# Patient Record
Sex: FEMALE | Race: WHITE | NOT HISPANIC OR LATINO | Employment: OTHER | URBAN - METROPOLITAN AREA
[De-identification: names, ages, dates, MRNs, and addresses within clinical notes are randomized per-mention and may not be internally consistent; named-entity substitution may affect disease eponyms.]

---

## 2020-06-04 PROBLEM — I10 HYPERTENSION: Status: ACTIVE | Noted: 2020-06-04

## 2020-06-04 PROBLEM — E11.9 TYPE 2 DIABETES MELLITUS WITHOUT COMPLICATION, WITHOUT LONG-TERM CURRENT USE OF INSULIN (HCC): Status: ACTIVE | Noted: 2020-06-04

## 2020-06-04 RX ORDER — BENAZEPRIL HYDROCHLORIDE AND HYDROCHLOROTHIAZIDE 20; 12.5 MG/1; MG/1
TABLET ORAL
COMMUNITY
Start: 2020-04-23 | End: 2020-06-08 | Stop reason: SDUPTHER

## 2020-06-08 ENCOUNTER — OFFICE VISIT (OUTPATIENT)
Dept: FAMILY MEDICINE CLINIC | Facility: CLINIC | Age: 80
End: 2020-06-08
Payer: COMMERCIAL

## 2020-06-08 ENCOUNTER — TELEPHONE (OUTPATIENT)
Dept: FAMILY MEDICINE CLINIC | Facility: CLINIC | Age: 80
End: 2020-06-08

## 2020-06-08 VITALS
TEMPERATURE: 98.2 F | OXYGEN SATURATION: 97 % | BODY MASS INDEX: 28.89 KG/M2 | HEIGHT: 62 IN | WEIGHT: 157 LBS | RESPIRATION RATE: 18 BRPM | SYSTOLIC BLOOD PRESSURE: 144 MMHG | HEART RATE: 66 BPM | DIASTOLIC BLOOD PRESSURE: 80 MMHG

## 2020-06-08 DIAGNOSIS — I10 ESSENTIAL HYPERTENSION: ICD-10-CM

## 2020-06-08 DIAGNOSIS — E11.9 TYPE 2 DIABETES MELLITUS WITHOUT COMPLICATION, WITHOUT LONG-TERM CURRENT USE OF INSULIN (HCC): Primary | ICD-10-CM

## 2020-06-08 DIAGNOSIS — B35.1 TOENAIL FUNGUS: ICD-10-CM

## 2020-06-08 DIAGNOSIS — M1A.4710 OTHER SECONDARY CHRONIC GOUT OF RIGHT ANKLE WITHOUT TOPHUS: ICD-10-CM

## 2020-06-08 DIAGNOSIS — M43.6 TORTICOLLIS: ICD-10-CM

## 2020-06-08 DIAGNOSIS — E11.65 UNCONTROLLED TYPE 2 DIABETES MELLITUS WITH HYPERGLYCEMIA (HCC): Primary | ICD-10-CM

## 2020-06-08 DIAGNOSIS — Z12.83 SKIN CANCER SCREENING: ICD-10-CM

## 2020-06-08 DIAGNOSIS — Z13.220 LIPID SCREENING: ICD-10-CM

## 2020-06-08 DIAGNOSIS — M81.0 AGE-RELATED OSTEOPOROSIS WITHOUT CURRENT PATHOLOGICAL FRACTURE: ICD-10-CM

## 2020-06-08 DIAGNOSIS — E78.2 HYPERLIPIDEMIA, MIXED: ICD-10-CM

## 2020-06-08 DIAGNOSIS — Z00.00 MEDICARE ANNUAL WELLNESS VISIT, SUBSEQUENT: ICD-10-CM

## 2020-06-08 PROBLEM — M10.9 GOUT: Status: ACTIVE | Noted: 2020-06-08

## 2020-06-08 LAB
LEFT EYE DIABETIC RETINOPATHY: NORMAL
LEFT EYE IMAGE QUALITY: NORMAL
LEFT EYE MACULAR EDEMA: NORMAL
LEFT EYE OTHER RETINOPATHY: NORMAL
RIGHT EYE DIABETIC RETINOPATHY: NORMAL
RIGHT EYE IMAGE QUALITY: NORMAL
RIGHT EYE MACULAR EDEMA: NORMAL
RIGHT EYE OTHER RETINOPATHY: NORMAL
SEVERITY (EYE EXAM): NORMAL
SL AMB POCT HEMOGLOBIN AIC: 7.1 (ref ?–6.5)

## 2020-06-08 PROCEDURE — 99214 OFFICE O/P EST MOD 30 MIN: CPT | Performed by: NURSE PRACTITIONER

## 2020-06-08 PROCEDURE — 83036 HEMOGLOBIN GLYCOSYLATED A1C: CPT | Performed by: NURSE PRACTITIONER

## 2020-06-08 PROCEDURE — 3008F BODY MASS INDEX DOCD: CPT | Performed by: NURSE PRACTITIONER

## 2020-06-08 PROCEDURE — 4010F ACE/ARB THERAPY RXD/TAKEN: CPT | Performed by: NURSE PRACTITIONER

## 2020-06-08 PROCEDURE — 3077F SYST BP >= 140 MM HG: CPT | Performed by: NURSE PRACTITIONER

## 2020-06-08 PROCEDURE — 1170F FXNL STATUS ASSESSED: CPT | Performed by: NURSE PRACTITIONER

## 2020-06-08 PROCEDURE — 3051F HG A1C>EQUAL 7.0%<8.0%: CPT | Performed by: NURSE PRACTITIONER

## 2020-06-08 PROCEDURE — 3288F FALL RISK ASSESSMENT DOCD: CPT | Performed by: NURSE PRACTITIONER

## 2020-06-08 PROCEDURE — 1125F AMNT PAIN NOTED PAIN PRSNT: CPT | Performed by: NURSE PRACTITIONER

## 2020-06-08 PROCEDURE — 1101F PT FALLS ASSESS-DOCD LE1/YR: CPT | Performed by: NURSE PRACTITIONER

## 2020-06-08 PROCEDURE — 1036F TOBACCO NON-USER: CPT | Performed by: NURSE PRACTITIONER

## 2020-06-08 PROCEDURE — 1160F RVW MEDS BY RX/DR IN RCRD: CPT | Performed by: NURSE PRACTITIONER

## 2020-06-08 PROCEDURE — 3079F DIAST BP 80-89 MM HG: CPT | Performed by: NURSE PRACTITIONER

## 2020-06-08 PROCEDURE — G0439 PPPS, SUBSEQ VISIT: HCPCS | Performed by: NURSE PRACTITIONER

## 2020-06-08 RX ORDER — SIMVASTATIN 10 MG
10 TABLET ORAL
Qty: 90 TABLET | Refills: 1 | Status: SHIPPED | OUTPATIENT
Start: 2020-06-08 | End: 2020-06-08 | Stop reason: SDUPTHER

## 2020-06-08 RX ORDER — GLIMEPIRIDE 4 MG/1
4 TABLET ORAL
COMMUNITY
End: 2020-06-08 | Stop reason: SDUPTHER

## 2020-06-08 RX ORDER — ACARBOSE 50 MG/1
50 TABLET ORAL
Qty: 90 TABLET | Refills: 1 | Status: SHIPPED | OUTPATIENT
Start: 2020-06-08 | End: 2020-06-08 | Stop reason: SDUPTHER

## 2020-06-08 RX ORDER — ALENDRONATE SODIUM 70 MG/1
70 TABLET ORAL
Qty: 12 TABLET | Refills: 1 | Status: SHIPPED | OUTPATIENT
Start: 2020-06-08 | End: 2020-06-09 | Stop reason: SDUPTHER

## 2020-06-08 RX ORDER — GLIMEPIRIDE 4 MG/1
4 TABLET ORAL
Qty: 90 TABLET | Refills: 1 | Status: SHIPPED | OUTPATIENT
Start: 2020-06-08 | End: 2020-06-08 | Stop reason: SDUPTHER

## 2020-06-08 RX ORDER — NIFEDIPINE 30 MG/1
30 TABLET, FILM COATED, EXTENDED RELEASE ORAL DAILY
Qty: 90 TABLET | Refills: 1 | Status: SHIPPED | OUTPATIENT
Start: 2020-06-08 | End: 2020-06-08 | Stop reason: SDUPTHER

## 2020-06-08 RX ORDER — CARVEDILOL 6.25 MG/1
6.25 TABLET ORAL 2 TIMES DAILY WITH MEALS
Qty: 180 TABLET | Refills: 3 | Status: SHIPPED | OUTPATIENT
Start: 2020-06-08 | End: 2020-06-09 | Stop reason: SDUPTHER

## 2020-06-08 RX ORDER — GLIMEPIRIDE 4 MG/1
4 TABLET ORAL
Qty: 90 TABLET | Refills: 1 | Status: SHIPPED | OUTPATIENT
Start: 2020-06-08 | End: 2020-06-09 | Stop reason: SDUPTHER

## 2020-06-08 RX ORDER — NIFEDIPINE 30 MG/1
30 TABLET, FILM COATED, EXTENDED RELEASE ORAL DAILY
Qty: 90 TABLET | Refills: 1 | Status: SHIPPED | OUTPATIENT
Start: 2020-06-08 | End: 2020-06-08

## 2020-06-08 RX ORDER — ALENDRONATE SODIUM 70 MG/1
70 TABLET ORAL
COMMUNITY
End: 2020-06-08 | Stop reason: SDUPTHER

## 2020-06-08 RX ORDER — ACARBOSE 50 MG/1
50 TABLET ORAL
Qty: 90 TABLET | Refills: 1 | Status: SHIPPED | OUTPATIENT
Start: 2020-06-08 | End: 2020-06-09 | Stop reason: SDUPTHER

## 2020-06-08 RX ORDER — BENAZEPRIL HYDROCHLORIDE AND HYDROCHLOROTHIAZIDE 20; 12.5 MG/1; MG/1
1 TABLET ORAL DAILY
Qty: 90 TABLET | Refills: 2 | Status: SHIPPED | OUTPATIENT
Start: 2020-06-08 | End: 2020-06-08 | Stop reason: SDUPTHER

## 2020-06-08 RX ORDER — ALENDRONATE SODIUM 70 MG/1
70 TABLET ORAL
Qty: 12 TABLET | Refills: 1 | Status: SHIPPED | OUTPATIENT
Start: 2020-06-08 | End: 2020-06-08 | Stop reason: SDUPTHER

## 2020-06-08 RX ORDER — ACARBOSE 50 MG/1
50 TABLET ORAL
COMMUNITY
End: 2020-06-08 | Stop reason: SDUPTHER

## 2020-06-08 RX ORDER — PEN NEEDLE, DIABETIC 30 GX3/16"
NEEDLE, DISPOSABLE MISCELLANEOUS WEEKLY
Qty: 12 EACH | Refills: 2 | Status: SHIPPED | OUTPATIENT
Start: 2020-06-08 | End: 2020-06-09 | Stop reason: SDUPTHER

## 2020-06-08 RX ORDER — SIMVASTATIN 10 MG
10 TABLET ORAL
Qty: 90 TABLET | Refills: 1 | Status: SHIPPED | OUTPATIENT
Start: 2020-06-08 | End: 2020-06-09 | Stop reason: SDUPTHER

## 2020-06-08 RX ORDER — NIFEDIPINE 30 MG/1
30 TABLET, FILM COATED, EXTENDED RELEASE ORAL DAILY
COMMUNITY
End: 2020-06-08 | Stop reason: SDUPTHER

## 2020-06-08 RX ORDER — SIMVASTATIN 10 MG
10 TABLET ORAL
COMMUNITY
End: 2020-06-08 | Stop reason: SDUPTHER

## 2020-06-08 RX ORDER — CYCLOBENZAPRINE HCL 5 MG
5 TABLET ORAL 2 TIMES DAILY PRN
Qty: 10 TABLET | Refills: 1 | Status: SHIPPED | OUTPATIENT
Start: 2020-06-08 | End: 2020-06-09 | Stop reason: SDUPTHER

## 2020-06-08 RX ORDER — CYCLOBENZAPRINE HCL 5 MG
5 TABLET ORAL 2 TIMES DAILY PRN
Qty: 10 TABLET | Refills: 1 | Status: SHIPPED | OUTPATIENT
Start: 2020-06-08 | End: 2020-06-08 | Stop reason: SDUPTHER

## 2020-06-08 RX ORDER — BENAZEPRIL HYDROCHLORIDE AND HYDROCHLOROTHIAZIDE 20; 12.5 MG/1; MG/1
1 TABLET ORAL DAILY
Qty: 90 TABLET | Refills: 2 | Status: SHIPPED | OUTPATIENT
Start: 2020-06-08 | End: 2020-06-09 | Stop reason: SDUPTHER

## 2020-06-09 DIAGNOSIS — I10 ESSENTIAL HYPERTENSION: ICD-10-CM

## 2020-06-09 DIAGNOSIS — E11.9 TYPE 2 DIABETES MELLITUS WITHOUT COMPLICATION, WITHOUT LONG-TERM CURRENT USE OF INSULIN (HCC): ICD-10-CM

## 2020-06-09 DIAGNOSIS — M43.6 TORTICOLLIS: ICD-10-CM

## 2020-06-09 DIAGNOSIS — Z12.83 SKIN CANCER SCREENING: ICD-10-CM

## 2020-06-09 DIAGNOSIS — E11.9 TYPE 2 DIABETES MELLITUS WITHOUT COMPLICATION, WITHOUT LONG-TERM CURRENT USE OF INSULIN (HCC): Primary | ICD-10-CM

## 2020-06-09 DIAGNOSIS — E78.2 HYPERLIPIDEMIA, MIXED: ICD-10-CM

## 2020-06-09 DIAGNOSIS — M81.0 AGE-RELATED OSTEOPOROSIS WITHOUT CURRENT PATHOLOGICAL FRACTURE: ICD-10-CM

## 2020-06-09 DIAGNOSIS — E11.65 UNCONTROLLED TYPE 2 DIABETES MELLITUS WITH HYPERGLYCEMIA (HCC): ICD-10-CM

## 2020-06-09 DIAGNOSIS — B35.1 TOENAIL FUNGUS: ICD-10-CM

## 2020-06-09 DIAGNOSIS — Z00.00 MEDICARE ANNUAL WELLNESS VISIT, SUBSEQUENT: ICD-10-CM

## 2020-06-09 DIAGNOSIS — Z13.220 LIPID SCREENING: ICD-10-CM

## 2020-06-09 DIAGNOSIS — M1A.4710 OTHER SECONDARY CHRONIC GOUT OF RIGHT ANKLE WITHOUT TOPHUS: ICD-10-CM

## 2020-06-09 RX ORDER — PEN NEEDLE, DIABETIC 30 GX3/16"
NEEDLE, DISPOSABLE MISCELLANEOUS WEEKLY
Qty: 12 EACH | Refills: 2 | Status: SHIPPED | OUTPATIENT
Start: 2020-06-09 | End: 2020-12-24

## 2020-06-09 RX ORDER — BLOOD-GLUCOSE METER
EACH MISCELLANEOUS DAILY
Qty: 100 EACH | Refills: 3 | Status: SHIPPED | OUTPATIENT
Start: 2020-06-09

## 2020-06-09 RX ORDER — CARVEDILOL 6.25 MG/1
6.25 TABLET ORAL 2 TIMES DAILY WITH MEALS
Qty: 180 TABLET | Refills: 3 | Status: SHIPPED | OUTPATIENT
Start: 2020-06-09 | End: 2021-07-16

## 2020-06-09 RX ORDER — CYCLOBENZAPRINE HCL 5 MG
5 TABLET ORAL 2 TIMES DAILY PRN
Qty: 10 TABLET | Refills: 1 | Status: SHIPPED | OUTPATIENT
Start: 2020-06-09 | End: 2020-06-09 | Stop reason: SDUPTHER

## 2020-06-09 RX ORDER — SIMVASTATIN 10 MG
10 TABLET ORAL
Qty: 90 TABLET | Refills: 1 | Status: SHIPPED | OUTPATIENT
Start: 2020-06-09 | End: 2021-04-13

## 2020-06-09 RX ORDER — BENAZEPRIL HYDROCHLORIDE AND HYDROCHLOROTHIAZIDE 20; 12.5 MG/1; MG/1
1 TABLET ORAL DAILY
Qty: 90 TABLET | Refills: 2 | Status: SHIPPED | OUTPATIENT
Start: 2020-06-09 | End: 2020-06-09 | Stop reason: SDUPTHER

## 2020-06-09 RX ORDER — ALENDRONATE SODIUM 70 MG/1
70 TABLET ORAL
Qty: 12 TABLET | Refills: 1 | Status: SHIPPED | OUTPATIENT
Start: 2020-06-09 | End: 2020-11-16

## 2020-06-09 RX ORDER — CYCLOBENZAPRINE HCL 5 MG
5 TABLET ORAL 2 TIMES DAILY PRN
Qty: 10 TABLET | Refills: 1 | Status: SHIPPED | OUTPATIENT
Start: 2020-06-09 | End: 2020-07-28

## 2020-06-09 RX ORDER — BENAZEPRIL HYDROCHLORIDE AND HYDROCHLOROTHIAZIDE 20; 12.5 MG/1; MG/1
1 TABLET ORAL DAILY
Qty: 90 TABLET | Refills: 2 | Status: SHIPPED | OUTPATIENT
Start: 2020-06-09 | End: 2021-03-18

## 2020-06-09 RX ORDER — GLIMEPIRIDE 4 MG/1
4 TABLET ORAL
Qty: 90 TABLET | Refills: 1 | Status: SHIPPED | OUTPATIENT
Start: 2020-06-09 | End: 2020-10-12

## 2020-06-09 RX ORDER — ACARBOSE 50 MG/1
50 TABLET ORAL
Qty: 90 TABLET | Refills: 1 | Status: SHIPPED | OUTPATIENT
Start: 2020-06-09 | End: 2020-10-12

## 2020-07-25 ENCOUNTER — TELEPHONE (OUTPATIENT)
Dept: OTHER | Facility: OTHER | Age: 80
End: 2020-07-25

## 2020-07-25 DIAGNOSIS — M43.6 TORTICOLLIS: ICD-10-CM

## 2020-07-25 NOTE — TELEPHONE ENCOUNTER
Pt calling for refill on Cyclobenzaprine HCl 5 mg Oral 2 times daily PRN    Informed pt refill request is pending and waiting to be approved by

## 2020-07-28 DIAGNOSIS — M43.6 TORTICOLLIS: ICD-10-CM

## 2020-07-28 RX ORDER — CYCLOBENZAPRINE HCL 5 MG
5 TABLET ORAL 2 TIMES DAILY PRN
Qty: 10 TABLET | Refills: 1 | Status: SHIPPED | OUTPATIENT
Start: 2020-07-28 | End: 2020-09-10

## 2020-07-28 RX ORDER — CYCLOBENZAPRINE HCL 5 MG
5 TABLET ORAL 2 TIMES DAILY PRN
Qty: 10 TABLET | Refills: 1 | Status: SHIPPED | OUTPATIENT
Start: 2020-07-28 | End: 2020-07-28 | Stop reason: SDUPTHER

## 2020-08-29 DIAGNOSIS — E11.9 TYPE 2 DIABETES MELLITUS WITHOUT COMPLICATION, WITHOUT LONG-TERM CURRENT USE OF INSULIN (HCC): Primary | ICD-10-CM

## 2020-08-29 DIAGNOSIS — I10 ESSENTIAL HYPERTENSION: ICD-10-CM

## 2020-08-31 RX ORDER — NIFEDIPINE 30 MG/1
TABLET, FILM COATED, EXTENDED RELEASE ORAL
Qty: 90 TABLET | Refills: 3 | Status: SHIPPED | OUTPATIENT
Start: 2020-08-31 | End: 2021-04-12 | Stop reason: ALTCHOICE

## 2020-08-31 RX ORDER — SITAGLIPTIN 100 MG/1
TABLET, FILM COATED ORAL
Qty: 90 TABLET | Refills: 3 | Status: SHIPPED | OUTPATIENT
Start: 2020-08-31 | End: 2021-04-12 | Stop reason: ALTCHOICE

## 2020-09-01 ENCOUNTER — APPOINTMENT (OUTPATIENT)
Dept: LAB | Facility: CLINIC | Age: 80
End: 2020-09-01
Payer: COMMERCIAL

## 2020-09-01 DIAGNOSIS — I10 ESSENTIAL HYPERTENSION: ICD-10-CM

## 2020-09-01 DIAGNOSIS — E11.9 TYPE 2 DIABETES MELLITUS WITHOUT COMPLICATION, WITHOUT LONG-TERM CURRENT USE OF INSULIN (HCC): ICD-10-CM

## 2020-09-01 DIAGNOSIS — Z13.220 LIPID SCREENING: ICD-10-CM

## 2020-09-01 DIAGNOSIS — M1A.4710 OTHER SECONDARY CHRONIC GOUT OF RIGHT ANKLE WITHOUT TOPHUS: ICD-10-CM

## 2020-09-01 LAB
ALBUMIN SERPL BCP-MCNC: 3.2 G/DL (ref 3.5–5)
ALP SERPL-CCNC: 56 U/L (ref 46–116)
ALT SERPL W P-5'-P-CCNC: 16 U/L (ref 12–78)
ANION GAP SERPL CALCULATED.3IONS-SCNC: 6 MMOL/L (ref 4–13)
AST SERPL W P-5'-P-CCNC: 12 U/L (ref 5–45)
BASOPHILS # BLD AUTO: 0.04 THOUSANDS/ΜL (ref 0–0.1)
BASOPHILS NFR BLD AUTO: 1 % (ref 0–1)
BILIRUB SERPL-MCNC: 0.35 MG/DL (ref 0.2–1)
BUN SERPL-MCNC: 22 MG/DL (ref 5–25)
CALCIUM ALBUM COR SERPL-MCNC: 10.8 MG/DL (ref 8.3–10.1)
CALCIUM SERPL-MCNC: 10.2 MG/DL (ref 8.3–10.1)
CHLORIDE SERPL-SCNC: 104 MMOL/L (ref 100–108)
CHOLEST SERPL-MCNC: 116 MG/DL (ref 50–200)
CO2 SERPL-SCNC: 30 MMOL/L (ref 21–32)
CREAT SERPL-MCNC: 1 MG/DL (ref 0.6–1.3)
CREAT UR-MCNC: 295 MG/DL
EOSINOPHIL # BLD AUTO: 0.23 THOUSAND/ΜL (ref 0–0.61)
EOSINOPHIL NFR BLD AUTO: 4 % (ref 0–6)
ERYTHROCYTE [DISTWIDTH] IN BLOOD BY AUTOMATED COUNT: 16 % (ref 11.6–15.1)
GFR SERPL CREATININE-BSD FRML MDRD: 54 ML/MIN/1.73SQ M
GLUCOSE P FAST SERPL-MCNC: 106 MG/DL (ref 65–99)
HCT VFR BLD AUTO: 36.4 % (ref 34.8–46.1)
HDLC SERPL-MCNC: 47 MG/DL
HGB BLD-MCNC: 10.8 G/DL (ref 11.5–15.4)
IMM GRANULOCYTES # BLD AUTO: 0.01 THOUSAND/UL (ref 0–0.2)
IMM GRANULOCYTES NFR BLD AUTO: 0 % (ref 0–2)
LDLC SERPL CALC-MCNC: 45 MG/DL (ref 0–100)
LYMPHOCYTES # BLD AUTO: 1.99 THOUSANDS/ΜL (ref 0.6–4.47)
LYMPHOCYTES NFR BLD AUTO: 31 % (ref 14–44)
MCH RBC QN AUTO: 27 PG (ref 26.8–34.3)
MCHC RBC AUTO-ENTMCNC: 29.7 G/DL (ref 31.4–37.4)
MCV RBC AUTO: 91 FL (ref 82–98)
MICROALBUMIN UR-MCNC: 33.1 MG/L (ref 0–20)
MICROALBUMIN/CREAT 24H UR: 11 MG/G CREATININE (ref 0–30)
MONOCYTES # BLD AUTO: 0.46 THOUSAND/ΜL (ref 0.17–1.22)
MONOCYTES NFR BLD AUTO: 7 % (ref 4–12)
NEUTROPHILS # BLD AUTO: 3.62 THOUSANDS/ΜL (ref 1.85–7.62)
NEUTS SEG NFR BLD AUTO: 57 % (ref 43–75)
NONHDLC SERPL-MCNC: 69 MG/DL
NRBC BLD AUTO-RTO: 0 /100 WBCS
PLATELET # BLD AUTO: 346 THOUSANDS/UL (ref 149–390)
PMV BLD AUTO: 9.4 FL (ref 8.9–12.7)
POTASSIUM SERPL-SCNC: 3.8 MMOL/L (ref 3.5–5.3)
PROT SERPL-MCNC: 7.8 G/DL (ref 6.4–8.2)
RBC # BLD AUTO: 4 MILLION/UL (ref 3.81–5.12)
SODIUM SERPL-SCNC: 140 MMOL/L (ref 136–145)
TRIGL SERPL-MCNC: 121 MG/DL
URATE SERPL-MCNC: 5.1 MG/DL (ref 2–6.8)
WBC # BLD AUTO: 6.35 THOUSAND/UL (ref 4.31–10.16)

## 2020-09-01 PROCEDURE — 80053 COMPREHEN METABOLIC PANEL: CPT

## 2020-09-01 PROCEDURE — 82570 ASSAY OF URINE CREATININE: CPT | Performed by: NURSE PRACTITIONER

## 2020-09-01 PROCEDURE — 84550 ASSAY OF BLOOD/URIC ACID: CPT

## 2020-09-01 PROCEDURE — 80061 LIPID PANEL: CPT

## 2020-09-01 PROCEDURE — 85025 COMPLETE CBC W/AUTO DIFF WBC: CPT

## 2020-09-01 PROCEDURE — 82043 UR ALBUMIN QUANTITATIVE: CPT | Performed by: NURSE PRACTITIONER

## 2020-09-01 PROCEDURE — 36415 COLL VENOUS BLD VENIPUNCTURE: CPT

## 2020-09-05 NOTE — PROGRESS NOTES
Assessment/Plan:       Diagnoses and all orders for this visit:    Type 2 diabetes mellitus without complication, without long-term current use of insulin (HCC)  -     Comprehensive metabolic panel; Future  -     Lipid panel; Future  -     Uric acid; Future  -     POCT hemoglobin A1c  -     Hemoglobin A1C; Future    Age-related osteoporosis without current pathological fracture  -     Comprehensive metabolic panel; Future  -     Lipid panel; Future  -     Uric acid; Future    Hyperlipidemia, mixed  -     Comprehensive metabolic panel; Future  -     Lipid panel; Future  -     Uric acid; Future    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  -     Comprehensive metabolic panel; Future  -     Lipid panel; Future  -     Uric acid; Future    Need for pneumococcal vaccine    Chronic neck pain  -     XR spine cervical complete 4 or 5 vw non injury; Future  -     Ambulatory referral to Physical Therapy; Future  -     Ambulatory referral to Sports Medicine; Future    Anemia, unspecified type  -     CBC and differential; Future  -     Iron Panel (Includes Ferritin, Iron Sat%, Iron, and TIBC); Future  -     Hemoglobin and hematocrit, blood; Future    Hypercalcemia  -     PTH, intact; Future    Other orders  -     Alcohol Swabs (Pharmacist Choice Alcohol) PADS; Use as directed  -     Lancet Devices (Adjustable Lancing Device) MISC; MEDICARE B  -     Multiple Vitamins-Minerals (PreserVision AREDS 2) CAPS; TAKE 2 CAPSULES BY MOUTH DAILY IN THE MORNING  PATIENT BRINGS STOCK FROM HOME  -     vitamin B-12 (VITAMIN B-12) 1,000 mcg tablet; TAKE ONE TABLET BY MOUTH DAILY IN THE MORNING  PATIENT BRINGS STOCK FROM HOME        No problem-specific Assessment & Plan notes found for this encounter  Subjective:      Patient ID: Mariah Setting is a 70 y o  female  Follow up visit  htn- stable  HLD-at goal  Gout-no further episodes since she eliminated fish from her diet  Intermittent foot swelling, with pain     Left wrist- she heard a "pop" and the left wrist became swollen and painful  This has resolved completely since   DM-on average am glucose around 100  A1C is 6 5 down from 7 1  Having increased neck pain  Pain is present every other day  Radiates to back of head  She has been taking Ibuprofen and muscle relaxant but this is not helping  Elevated calcium- new finding   Overweight- has lost 13 pounds with her new diet plan    Results for Raghav Vargas (MRN [id number]) as of 9/10/2020 11:47    2020 09:45  Sodium: 140  Potassium: 3 8  Chloride: 104  CO2: 30  Anion Gap: 6  BUN: 22  Creatinine: 1 00  GLUCOSE FASTIN (H)  Calcium: 10 2 (H)  CORRECTED CALCIUM: 10 8 (H)  AST: 12  ALT: 16  Alkaline Phosphatase: 56  Total Protein: 7 8  Albumin: 3 2 (L)  TOTAL BILIRUBIN: 0 35  eGFR: 54  Cholesterol: 116  Triglycerides: 121  HDL: 47  Non-HDL Cholesterol: 69  LDL Calculated: 45  WBC: 6 35  Red Blood Cell Count: 4 00  Hemoglobin: 10 8 (L)  HCT: 36 4  MCV: 91  MCH: 27 0  MCHC: 29 7 (L)  RDW: 16 0 (H)  Platelet Count: 441  MPV: 9 4  nRBC: 0  Neutrophils %: 57  Immat GRANS %: 0  Lymphocytes Relative: 31  Monocytes Relative: 7  Eosinophils: 4  Basophils Relative: 1  Immature Grans Absolute: 0 01  Absolute Neutrophils: 3 62  Lymphocytes Absolute: 1 99  Absolute Monocytes: 0 46  Absolute Eosinophils: 0 23  Basophils Absolute: 0 04  EXT Creatinine Urine: 295 0  MICROALBUMIN/CREATININE RATIO: 11  MICROALBUM ,U,RANDOM: 33 1 (H)  URIC ACID: 5 1        The following portions of the patient's history were reviewed and updated as appropriate:   She has a past medical history of Diabetes mellitus ([address] ), Hypertension, and Osteoporosis  ,  does not have any pertinent problems on file  ,   has a past surgical history that includes  section  ,  family history includes COPD in her mother; Diabetes in her mother; Substance Abuse in her mother  ,   reports that she has never smoked   She has never used smokeless tobacco  She reports that she does not drink alcohol or use drugs  ,  has No Known Allergies     Current Outpatient Medications   Medication Sig Dispense Refill    acarbose (PRECOSE) 50 mg tablet Take 1 tablet (50 mg total) by mouth daily with breakfast 90 tablet 1    Alcohol Swabs (Pharmacist Choice Alcohol) PADS Use as directed      alendronate (FOSAMAX) 70 mg tablet Take 1 tablet (70 mg total) by mouth every 7 days 12 tablet 1    benazepril-hydrochlorthiazide (LOTENSIN HCT) 20-12 5 MG per tablet Take 1 tablet by mouth daily 90 tablet 2    carvedilol (COREG) 6 25 mg tablet Take 1 tablet (6 25 mg total) by mouth 2 (two) times a day with meals 180 tablet 3    Dulaglutide 0 75 MG/0 5ML SOPN Inject 0 5 mL (0 75 mg total) under the skin once a week 2 mL 2    glimepiride (AMARYL) 4 mg tablet Take 1 tablet (4 mg total) by mouth daily with breakfast 90 tablet 1    glucose blood (OneTouch Verio) test strip Test sugar daily 100 each 3    Insulin Pen Needle (PEN NEEDLES) 32G X 4 MM MISC by Does not apply route once a week 12 each 2    Januvia 100 MG tablet TAKE ONE TABLET BY MOUTH DAILY 90 tablet 3    Lancet Devices (Adjustable Lancing Device) MISC MEDICARE B      [employer] FINEPOINT LANCETS MISC by Does not apply route daily 100 each 3    metFORMIN (GLUCOPHAGE) 850 mg tablet Take 1 tablet (850 mg total) by mouth 2 (two) times a day with meals 180 tablet 1    Multiple Vitamins-Minerals (PreserVision AREDS 2) CAPS TAKE 2 CAPSULES BY MOUTH DAILY IN THE MORNING  PATIENT BRINGS STOCK FROM HOME      NIFEdipine ER (ADALAT CC) 30 MG 24 hr tablet TAKE ONE TABLET BY MOUTH DAILY 90 tablet 3    simvastatin (ZOCOR) 10 mg tablet Take 1 tablet (10 mg total) by mouth daily at bedtime 90 tablet 1    vitamin B-12 (VITAMIN B-12) 1,000 mcg tablet TAKE ONE TABLET BY MOUTH DAILY IN THE MORNING  PATIENT BRINGS STOCK FROM HOME       No current facility-administered medications for this visit  Review of Systems   Constitutional: Negative  Negative for fatigue and fever  HENT: Negative  Negative for congestion  Eyes: Negative  Negative for visual disturbance  Respiratory: Negative for cough, chest tightness, shortness of breath and wheezing  Cardiovascular: Negative  Gastrointestinal: Negative  Negative for abdominal pain, blood in stool, diarrhea and nausea  Endocrine: Negative for polydipsia, polyphagia and polyuria  Genitourinary: Negative for difficulty urinating and flank pain  Musculoskeletal: Negative  Negative for arthralgias, back pain and myalgias  Skin: Negative  Negative for color change, pallor and rash  Allergic/Immunologic: Negative for immunocompromised state  Neurological: Negative  Negative for dizziness, weakness, light-headedness, numbness and headaches  Hematological: Negative for adenopathy  Psychiatric/Behavioral: Negative  Negative for confusion, decreased concentration and sleep disturbance  All other systems reviewed and are negative  Objective:  Vitals:    09/10/20 1141   BP: 130/78   BP Location: Left arm   Patient Position: Sitting   Pulse: 100   Resp: 18   SpO2: 97%   Weight: 65 3 kg (144 lb)   Height: 5' 2" (1 575 m)     Body mass index is 26 34 kg/m²  Physical Exam  Vitals signs and nursing note reviewed  Constitutional:       General: She is not in acute distress  Appearance: Normal appearance  She is well-developed  She is not ill-appearing, toxic-appearing or diaphoretic  HENT:      Head: Normocephalic and atraumatic  Right Ear: Tympanic membrane, ear canal and external ear normal       Left Ear: Tympanic membrane, ear canal and external ear normal       Nose: Nose normal  No congestion or rhinorrhea  Mouth/Throat:      Mouth: Mucous membranes are moist       Pharynx: Oropharynx is clear  No oropharyngeal exudate or posterior oropharyngeal erythema  Eyes:      General: No scleral icterus       Conjunctiva/sclera: Conjunctivae normal       Pupils: Pupils are equal, round, and reactive to light  Neck:      Musculoskeletal: Normal range of motion and neck supple  Vascular: No JVD  Cardiovascular:      Rate and Rhythm: Normal rate and regular rhythm  Pulses: Normal pulses  Heart sounds: Normal heart sounds  No murmur  No friction rub  No gallop  Pulmonary:      Effort: Pulmonary effort is normal  No respiratory distress  Breath sounds: Normal breath sounds  No wheezing or rhonchi  Abdominal:      General: Bowel sounds are normal  There is no distension  Palpations: Abdomen is soft  Tenderness: There is no abdominal tenderness  Musculoskeletal: Normal range of motion  General: No swelling, tenderness or deformity  Right lower leg: No edema  Left lower leg: No edema  Lymphadenopathy:      Cervical: No cervical adenopathy  Skin:     General: Skin is warm and dry  Capillary Refill: Capillary refill takes less than 2 seconds  Coloration: Skin is not jaundiced  Findings: No bruising or rash  Neurological:      General: No focal deficit present  Mental Status: She is alert and oriented to person, place, and time  Cranial Nerves: No cranial nerve deficit  Sensory: No sensory deficit  Coordination: Coordination normal       Gait: Gait normal    Psychiatric:         Mood and Affect: Mood normal          Behavior: Behavior normal          Thought Content:  Thought content normal          Judgment: Judgment normal

## 2020-09-10 ENCOUNTER — OFFICE VISIT (OUTPATIENT)
Dept: FAMILY MEDICINE CLINIC | Facility: CLINIC | Age: 80
End: 2020-09-10
Payer: COMMERCIAL

## 2020-09-10 VITALS
WEIGHT: 144 LBS | OXYGEN SATURATION: 97 % | HEART RATE: 100 BPM | BODY MASS INDEX: 26.5 KG/M2 | HEIGHT: 62 IN | RESPIRATION RATE: 18 BRPM | SYSTOLIC BLOOD PRESSURE: 130 MMHG | DIASTOLIC BLOOD PRESSURE: 78 MMHG

## 2020-09-10 DIAGNOSIS — Z23 NEED FOR PNEUMOCOCCAL VACCINE: ICD-10-CM

## 2020-09-10 DIAGNOSIS — D64.9 ANEMIA, UNSPECIFIED TYPE: ICD-10-CM

## 2020-09-10 DIAGNOSIS — G89.29 CHRONIC NECK PAIN: ICD-10-CM

## 2020-09-10 DIAGNOSIS — E83.52 HYPERCALCEMIA: ICD-10-CM

## 2020-09-10 DIAGNOSIS — M81.0 AGE-RELATED OSTEOPOROSIS WITHOUT CURRENT PATHOLOGICAL FRACTURE: ICD-10-CM

## 2020-09-10 DIAGNOSIS — M54.2 CHRONIC NECK PAIN: ICD-10-CM

## 2020-09-10 DIAGNOSIS — M1A.4710 OTHER SECONDARY CHRONIC GOUT OF RIGHT ANKLE WITHOUT TOPHUS: ICD-10-CM

## 2020-09-10 DIAGNOSIS — E11.9 TYPE 2 DIABETES MELLITUS WITHOUT COMPLICATION, WITHOUT LONG-TERM CURRENT USE OF INSULIN (HCC): Primary | ICD-10-CM

## 2020-09-10 DIAGNOSIS — E78.2 HYPERLIPIDEMIA, MIXED: ICD-10-CM

## 2020-09-10 LAB — SL AMB POCT HEMOGLOBIN AIC: 6.5 (ref ?–6.5)

## 2020-09-10 PROCEDURE — 83036 HEMOGLOBIN GLYCOSYLATED A1C: CPT | Performed by: NURSE PRACTITIONER

## 2020-09-10 PROCEDURE — 99214 OFFICE O/P EST MOD 30 MIN: CPT | Performed by: NURSE PRACTITIONER

## 2020-09-10 RX ORDER — ANTIOX #8/OM3/DHA/EPA/LUT/ZEAX 250-2.5 MG
CAPSULE ORAL
COMMUNITY
Start: 2020-06-09

## 2020-09-10 RX ORDER — LANOLIN ALCOHOL/MO/W.PET/CERES
CREAM (GRAM) TOPICAL
COMMUNITY
Start: 2020-06-09

## 2020-09-10 RX ORDER — ISOPROPYL ALCOHOL 0.7 ML/ML
SWAB TOPICAL
COMMUNITY
Start: 2020-07-08

## 2020-09-10 RX ORDER — LANCING DEVICE
EACH MISCELLANEOUS
COMMUNITY
Start: 2020-06-10

## 2020-09-10 NOTE — PATIENT INSTRUCTIONS
Follow up visit  htn- stable  HLD-at goal  Gout-no further episodes since she eliminated fish from her diet  Intermittent foot swelling, with pain  Left wrist- she heard a "pop" and the left wrist became swollen and painful  This has resolved completely since July 13  DM-on average am glucose around 100  A1C is 6 5 down from 7 1  Having increased neck pain  Pain is present every other day  Radiates to back of head  She has been taking Ibuprofen and muscle relaxant but this is not helping  Elevated calcium- new finding  Check PTH with next set of labs  Anemia- new finding  Repeat H/H in one month  Overweight- has done very well with weight loss and improved her blood sugars  Continue current plan  Follow up in 4 months  Results for Kaci Freitas (MRN [id number]) as of 9/10/2020 11:49   Ref   Range 9/1/2020 09:45   Sodium Latest Ref Range: 136 - 145 mmol/L 140   Potassium Latest Ref Range: 3 5 - 5 3 mmol/L 3 8   Chloride Latest Ref Range: 100 - 108 mmol/L 104   CO2 Latest Ref Range: 21 - 32 mmol/L 30   Anion Gap Latest Ref Range: 4 - 13 mmol/L 6   BUN Latest Ref Range: 5 - 25 mg/dL 22   Creatinine Latest Ref Range: 0 60 - 1 30 mg/dL 1 00   GLUCOSE FASTING Latest Ref Range: 65 - 99 mg/dL 106 (H)   Calcium Latest Ref Range: 8 3 - 10 1 mg/dL 10 2 (H)   CORRECTED CALCIUM Latest Ref Range: 8 3 - 10 1 mg/dL 10 8 (H)   AST Latest Ref Range: 5 - 45 U/L 12   ALT Latest Ref Range: 12 - 78 U/L 16   Alkaline Phosphatase Latest Ref Range: 46 - 116 U/L 56   Total Protein Latest Ref Range: 6 4 - 8 2 g/dL 7 8   Albumin Latest Ref Range: 3 5 - 5 0 g/dL 3 2 (L)   TOTAL BILIRUBIN Latest Ref Range: 0 20 - 1 00 mg/dL 0 35   eGFR Latest Units: ml/min/1 73sq m 54   Cholesterol Latest Ref Range: 50 - 200 mg/dL 116   Triglycerides Latest Ref Range: <=150 mg/dL 121   HDL Latest Ref Range: >=40 mg/dL 47   Non-HDL Cholesterol Latest Units: mg/dl 69   LDL Calculated Latest Ref Range: 0 - 100 mg/dL 45   WBC Latest Ref Range: 4 31 - 10 16 Thousand/uL 6 35   Red Blood Cell Count Latest Ref Range: 3 81 - 5 12 Million/uL 4 00   Hemoglobin Latest Ref Range: 11 5 - 15 4 g/dL 10 8 (L)   HCT Latest Ref Range: 34 8 - 46 1 % 36 4   MCV Latest Ref Range: 82 - 98 fL 91   MCH Latest Ref Range: 26 8 - 34 3 pg 27 0   MCHC Latest Ref Range: 31 4 - 37 4 g/dL 29 7 (L)   RDW Latest Ref Range: 11 6 - 15 1 % 16 0 (H)   Platelet Count Latest Ref Range: 149 - 390 Thousands/uL 346   MPV Latest Ref Range: 8 9 - 12 7 fL 9 4   nRBC Latest Units: /100 WBCs 0   Neutrophils % Latest Ref Range: 43 - 75 % 57   Immat GRANS % Latest Ref Range: 0 - 2 % 0   Lymphocytes Relative Latest Ref Range: 14 - 44 % 31   Monocytes Relative Latest Ref Range: 4 - 12 % 7   Eosinophils Latest Ref Range: 0 - 6 % 4   Basophils Relative Latest Ref Range: 0 - 1 % 1   Immature Grans Absolute Latest Ref Range: 0 00 - 0 20 Thousand/uL 0 01   Absolute Neutrophils Latest Ref Range: 1 85 - 7 62 Thousands/µL 3 62   Lymphocytes Absolute Latest Ref Range: 0 60 - 4 47 Thousands/µL 1 99   Absolute Monocytes Latest Ref Range: 0 17 - 1 22 Thousand/µL 0 46   Absolute Eosinophils Latest Ref Range: 0 00 - 0 61 Thousand/µL 0 23   Basophils Absolute Latest Ref Range: 0 00 - 0 10 Thousands/µL 0 04   EXT Creatinine Urine Latest Units: mg/dL 295 0   MICROALBUMIN/CREATININE RATIO Latest Ref Range: 0 - 30 mg/g creatinine 11   MICROALBUM ,U,RANDOM Latest Ref Range: 0 0 - 20 0 mg/L 33 1 (H)   URIC ACID Latest Ref Range: 2 0 - 6 8 mg/dL 5 1

## 2020-09-14 ENCOUNTER — APPOINTMENT (OUTPATIENT)
Dept: RADIOLOGY | Facility: CLINIC | Age: 80
End: 2020-09-14
Payer: COMMERCIAL

## 2020-09-14 DIAGNOSIS — M54.2 CHRONIC NECK PAIN: ICD-10-CM

## 2020-09-14 DIAGNOSIS — G89.29 CHRONIC NECK PAIN: ICD-10-CM

## 2020-09-14 PROCEDURE — 72050 X-RAY EXAM NECK SPINE 4/5VWS: CPT

## 2020-09-19 DIAGNOSIS — E11.65 UNCONTROLLED TYPE 2 DIABETES MELLITUS WITH HYPERGLYCEMIA (HCC): ICD-10-CM

## 2020-09-21 RX ORDER — DULAGLUTIDE 0.75 MG/.5ML
INJECTION, SOLUTION SUBCUTANEOUS
Qty: 2 ML | Refills: 2 | Status: SHIPPED | OUTPATIENT
Start: 2020-09-21 | End: 2020-11-03

## 2020-09-23 ENCOUNTER — EVALUATION (OUTPATIENT)
Dept: PHYSICAL THERAPY | Facility: CLINIC | Age: 80
End: 2020-09-23
Payer: COMMERCIAL

## 2020-09-23 ENCOUNTER — OFFICE VISIT (OUTPATIENT)
Dept: OBGYN CLINIC | Facility: CLINIC | Age: 80
End: 2020-09-23
Payer: COMMERCIAL

## 2020-09-23 VITALS
HEIGHT: 62 IN | HEART RATE: 74 BPM | SYSTOLIC BLOOD PRESSURE: 119 MMHG | TEMPERATURE: 98.5 F | WEIGHT: 144 LBS | BODY MASS INDEX: 26.5 KG/M2 | DIASTOLIC BLOOD PRESSURE: 73 MMHG

## 2020-09-23 DIAGNOSIS — G89.29 CHRONIC NECK PAIN: ICD-10-CM

## 2020-09-23 DIAGNOSIS — M50.30 DEGENERATIVE DISC DISEASE, CERVICAL: ICD-10-CM

## 2020-09-23 DIAGNOSIS — M62.838 TRAPEZIUS MUSCLE SPASM: ICD-10-CM

## 2020-09-23 DIAGNOSIS — M47.812 FACET ARTHROPATHY, CERVICAL: Primary | ICD-10-CM

## 2020-09-23 DIAGNOSIS — M54.2 CHRONIC NECK PAIN: ICD-10-CM

## 2020-09-23 DIAGNOSIS — M54.12 RADICULOPATHY, CERVICAL REGION: ICD-10-CM

## 2020-09-23 DIAGNOSIS — M48.02 FORAMINAL STENOSIS OF CERVICAL REGION: ICD-10-CM

## 2020-09-23 PROCEDURE — 97110 THERAPEUTIC EXERCISES: CPT | Performed by: PHYSICAL THERAPIST

## 2020-09-23 PROCEDURE — 97162 PT EVAL MOD COMPLEX 30 MIN: CPT | Performed by: PHYSICAL THERAPIST

## 2020-09-23 PROCEDURE — 99204 OFFICE O/P NEW MOD 45 MIN: CPT | Performed by: FAMILY MEDICINE

## 2020-09-23 PROCEDURE — 3078F DIAST BP <80 MM HG: CPT | Performed by: FAMILY MEDICINE

## 2020-09-23 RX ORDER — MELOXICAM 15 MG/1
15 TABLET ORAL DAILY
Qty: 30 TABLET | Refills: 1 | Status: SHIPPED | OUTPATIENT
Start: 2020-09-23 | End: 2020-12-23 | Stop reason: SDUPTHER

## 2020-09-23 NOTE — PROGRESS NOTES
Assessment/Plan:  Assessment/Plan   Diagnoses and all orders for this visit:    Facet arthropathy, cervical  -     Ambulatory referral to Sports Medicine  -     Ambulatory referral to Physical Therapy; Future  -     meloxicam (MOBIC) 15 mg tablet; Take 1 tablet (15 mg total) by mouth daily    Foraminal stenosis of cervical region  -     Ambulatory referral to Physical Therapy; Future    Degenerative disc disease, cervical  -     Ambulatory referral to Physical Therapy; Future    Radiculopathy, cervical region  -     Ambulatory referral to Physical Therapy; Future    Trapezius muscle spasm  -     Ambulatory referral to Physical Therapy; Future       51-year-old right-hand-dominant female with neck pain of more than 6 months duration  Discussed with patient physical exam, radiographs, impression and plan  X-rays of the cervical spine are noted for multilevel degenerative disc disease, facet arthropathy, and foraminal narrowing  Cervical spine is noted for midline tenderness C5-C7, and bilateral paraspinal tenderness  She has limited range of motion in all planes but most pronounced with side bending to both sides  There is positive axial load and negative Spurling's  She has intact strength, sensation, biceps reflex, and radial pulse in both upper extremities  Clinical impression is that she is symptomatic from degenerative changes in the cervical spine  I discussed treatment regimen of anti-inflammatory, supplements, and physical therapy  She is to take meloxicam 15 mg once daily with food consistently for 30 days and during that time not to take any ibuprofen or Aleve but may take Tylenol, start taking tumeric 500 mg twice daily, tart cherry 1000 mg daily, and Osteo Bi-Flex triple strength twice daily  She is to continue with physical therapy and do home exercises as directed  She will follow up in 6 weeks at which point she will be re-evaluated  Subjective:   Patient ID: Hector Taniya is a 70 y o  female  Chief Complaint   Patient presents with    Neck - Pain        82-year-old right-hand-dominant female presents for evaluation of neck pain of more than 6 months duration  She denies any particular trauma or inciting event  Pain described as localized to posterior aspect of the neck, gradual in onset, achy and sore, radiating to both shoulders, worse with turning her head, associated with numbness and tingling in the left upper extremity, associated stiffness and limited range of motion, and improved with return to neutral position  She reports having seen a doctor when she was living in [place] who prescribed her course of medication for 10 days which helped with her pain  She has since relocated to this area and did not continue treatment for her neck pain  She saw her primary care provider 2 weeks ago and was referred for x-rays of the cervical spine, which were noted for degenerative changes, and she was referred to physical therapy and Sports Medicine  She has been taking ibuprofen to help with pain  She had her 1st session of physical therapy today and while she was doing a stretching modality she felt a pop in the neck and since then has had dramatic improvement in her range of motion  Neck Pain   This is a chronic problem  The current episode started more than 1 month ago  The problem occurs daily  The problem has been gradually improving  Associated symptoms include arthralgias, neck pain and numbness  Pertinent negatives include no abdominal pain, chest pain, chills, fever, rash, sore throat or weakness  Exacerbated by:   Head turning  She has tried rest, NSAIDs and position changes (  Physical therapy) for the symptoms  The treatment provided mild relief  The following portions of the patient's history were reviewed and updated as appropriate: She  has a past medical history of Diabetes mellitus ([address] ), Hypertension, and Osteoporosis    She  has a past surgical history that includes  section  Her family history includes COPD in her mother; Diabetes in her mother; Substance Abuse in her mother  She  reports that she has never smoked  She has never used smokeless tobacco  She reports that she does not drink alcohol or use drugs  She has No Known Allergies       Review of Systems   Constitutional: Negative for chills and fever  HENT: Negative for sore throat  Eyes: Negative for visual disturbance  Respiratory: Negative for shortness of breath  Cardiovascular: Negative for chest pain  Gastrointestinal: Negative for abdominal pain  Genitourinary: Negative for flank pain  Musculoskeletal: Positive for arthralgias and neck pain  Skin: Negative for rash and wound  Neurological: Positive for numbness  Negative for weakness  Hematological: Does not bruise/bleed easily  Psychiatric/Behavioral: Negative for self-injury  Objective:  Vitals:    20 1136   BP: 119/73   Pulse: 74   Temp: 98 5 °F (36 9 °C)   Weight: 65 3 kg (144 lb)   Height: 5' 2" (1 575 m)     Back Exam     Reflexes   Biceps: normal    Comments:      Cervical spine  - midline tenderness C6-C7, bilateral paraspinal tenderness  -Limited range of motion all planes   -Positive axial load  -negative Spurling's  - normal strength, sensation, and biceps reflex in both upper extremities      Right Hand Exam     Muscle Strength   The patient has normal right wrist strength  Other   Sensation: normal  Pulse: present      Left Hand Exam     Muscle Strength   The patient has normal left wrist strength  Other   Sensation: normal  Pulse: present      Right Elbow Exam     Muscle Strength   Right elbow normal strength:  5/5 strength flexion and extension  Other   Sensation: normal      Left Elbow Exam     Muscle Strength   Left elbow normal strength:  5/5 strength flexion and extension      Other   Sensation: normal      Right Shoulder Exam     Muscle Strength   Abduction: 5/5     Other Sensation: normal      Left Shoulder Exam     Muscle Strength   Abduction: 5/5     Other   Sensation: normal           Strength/Myotome Testing     Left Wrist/Hand   Normal wrist strength    Right Wrist/Hand   Normal wrist strength      Physical Exam  Vitals signs and nursing note reviewed  Constitutional:       General: She is not in acute distress  Appearance: She is well-developed  She is not ill-appearing or diaphoretic  HENT:      Head: Normocephalic  Eyes:      Conjunctiva/sclera: Conjunctivae normal    Neck:      Trachea: No tracheal deviation  Cardiovascular:      Rate and Rhythm: Normal rate  Pulmonary:      Effort: Pulmonary effort is normal  No respiratory distress  Abdominal:      General: There is no distension  Musculoskeletal:         General: Tenderness present  No swelling, deformity or signs of injury  Skin:     General: Skin is warm and dry  Coloration: Skin is not jaundiced or pale  Neurological:      Mental Status: She is alert and oriented to person, place, and time  Psychiatric:         Mood and Affect: Mood normal          Behavior: Behavior normal          Thought Content: Thought content normal          Judgment: Judgment normal          I have personally reviewed pertinent films in PACS and my interpretation is   Multilevel degenerative disc disease, facet arthropathy, and foraminal narrowing

## 2020-09-23 NOTE — PROGRESS NOTES
PT Evaluation     Today's date: 2020  Patient name: Pelon Corbin  : 1940  MRN: [id number]  Referring provider: VERONIQUE Cifuentes  Dx:   Encounter Diagnosis     ICD-10-CM    1  Chronic neck pain  M54 2 Ambulatory referral to Physical Therapy    G89 29                   Assessment  Assessment details: Pelon Corbin is a pleasant 70 y o  presenting to physical therapy with MD referral for Chronic neck pain  Problem list: Poor posture, decreased upper extremity strength, increased muscle tension, and reduced joint mobility    Treatment to include: Manual therapy techniques, RTC/periscapular strengthening, postural re-education, instruction in a comprehensive HEP, and modalities as needed  This pt would benefit from skilled PT services to address their impairments and functional limitations to maximize functional outcome  Symptom irritability: moderateBarriers to therapy: Chronicity of symptoms, degenerative changes, DM HTN  Understanding of Dx/Px/POC: good   Prognosis: good    Goals  ST  Pt will improve cervical sidebend to at least 15 degrees bilaterally in 3 weeks  2  Pt will improve cervical rotation to at least 45 degrees bilaterally in 3 weeks  LT  Pt will be able to look down to read a book with no more than mild discomfort in 6 weeks  2  Pt will be independent in a comprehensive HEP in 6 weeks  Plan  Patient would benefit from: skilled physical therapy  Frequency: 2x week  Duration in weeks: 6  Treatment plan discussed with: patient        Subjective Evaluation    History of Present Illness  Mechanism of injury: Pt reports she began to experience pain in her cervical spine approximately 1 year ago with insidious onset  Pt reports the pain is concentrated across bilateral upper trapezius muscles and along cervical paravertebral muscles  Pt reports she experiences sharp pain in her temporal lobes occasionally when she is experiencing increased cervical pain   Patient denies any lightheadedness or dizziness  Pt repeorts occasional numbness in left hand numbness  Premorbid status:  - ADLs: Independent with no difficulty  - Work: Not a working individual- retired  - Recreation:walking    Current status:  - ADLs/Functional activities:     - Rotating neck while driving with no pain, however, limited motion and slow speed   - Looking up with moderate pain on return to neutral   - Looking down to read a book with moderate pain   - Reaching into overhead cabinets - now avoids   - Lifting groceries/laundry basket with no issues   - Sitting 1 5 hours without increase in pain   - Sleeping with 0 nightly sleep disturbances due to pain- taking medication to aid in sleep  - Work: Not a working individual  - Recreation: walking  Pain  Current pain ratin  At best pain ratin  At worst pain ratin  Location: bilateral upper trapezius muscles, cervical PVMs, and left temporal   Quality: sharp, tight and pressure  Relieving factors: ice  Aggravating factors: overhead activity  Progression: no change      Diagnostic Tests  X-ray: abnormal  Treatments  Previous treatment: medication  Current treatment: medication and physical therapy  Patient Goals  Patient goals for therapy: decreased pain, increased motion and independence with ADLs/IADLs          Objective     Postural Observations  Seated posture: fair    Additional Postural Observation Details  Moderate forward rounded shoulders and forward head posture    Palpation   Left   Muscle spasm in the levator scapulae and upper trapezius  Right   Muscle spasm in the levator scapulae and upper trapezius       Additional Palpation Details  Increased tension in bilateral PVMs    Neurological Testing     Sensation   Cervical/Thoracic   Left   Intact: light touch    Right   Intact: light touch    Reflexes   Left   Biceps (C5/C6): normal (2+)  Brachioradialis (C6): normal (2+)  Triceps (C7): normal (2+)    Right   Biceps (C5/C6): normal (2+)  Brachioradialis (C6): normal (2+)  Triceps (C7): normal (2+)    Active Range of Motion   Cervical/Thoracic Spine       Cervical    Flexion: 38 degrees   Extension: 42 degrees      Left lateral flexion: 5 degrees      Right lateral flexion: 10 degrees      Left rotation: 35 degrees  Right rotation: 32 degrees             Strength/Myotome Testing   Cervical Spine     Left   Interossei strength (t1): 5    Right   Interossei strength (t1): 4- and 5    Left Shoulder     Planes of Motion   Flexion: 4-   Abduction: 4-   External rotation at 0°: 4   Internal rotation at 0°: 4+     Right Shoulder     Planes of Motion   Flexion: 4-   Abduction: 4-   External rotation at 0°: 4+   Internal rotation at 0°: 5     Left Elbow   Flexion: 5  Extension: 5    Right Elbow   Flexion: 5  Extension: 5    Left Wrist/Hand   Wrist extension: 5  Wrist flexion: 5  Thumb extension: 5    Right Wrist/Hand   Wrist extension: 5  Wrist flexion: 5  Thumb extension: 5    Tests   Cervical   Positive vertical compression  Negative alar ligament test, transverse ligament test and VBI  Left   Positive cervical flexion-rotation test       Right   Positive cervical flexion-rotation test      Left Shoulder   Negative ULTT1, ULTT3 and ULTT4  Right Shoulder   Negative ULTT1, ULTT3 and ULTT4  Lumbar   Positive vertical compression          Flowsheet Rows      Most Recent Value   PT/OT G-Codes   Current Score  59   Projected Score  64             Precautions: anterolisthesis of C7/T1, DM, HTN      Manuals 9-23 (IE)            MET to improve rotation at C1/C2 bilaterally NV            STM to B UT/LS NV                                      Neuro Re-Ed             Seated:             -cervical retraction 10 x 10" NV            - scapular retraction 10 x 10" NV            - slouch and overcorrect 10 x 10" NV                         TB:             - rows             - pulldowns             - no money                                       Ther Ex UBE posterior standing 6 mins NV                         Corner stretch 4 x 30" NV                         Seated:             - pulleys with cervical rotation toward down arm 3 mins  2" NV                         Prone: (towel roll under forehead)             - I's             - T's             - Y's                          Ther Activity                                       Gait Training                                       Modalities             MH as needed                            * On initial evaluation, educated pt on anatomy, pathology, and exercise rationale  Provided pt with basic HEP and ensured proper exercise performance  Educated pt to call with any questions or concerns  Access Code: [email or web address]   URL: [email or web address]   Date: 09/23/2020   Prepared by: Lennox Edis      Exercises  · Corner Pec Major Stretch - 4 reps - 30 seconds hold - 3x daily  · Seated Scapular Retraction - 10 reps - 10 seconds hold - 3x daily  · Neck Retraction - 10 reps - 10 seconds hold - 3x daily  · Seated Gentle Upper Trapezius Stretch - 4 reps - 30 seconds hold - 3x daily  · Gentle Levator Scapulae Stretch - 4 reps - 30 seconds hold - 3x daily

## 2020-09-23 NOTE — LETTER
September 23, 2020     Romulo Gordon, [address]  [facility]  [address]    Patient: Truong Peñaloza   YOB: 1940   Date of Visit: 9/23/2020       Dear Dr Uday Mcdowell:    Thank you for referring Truong Peñaloza to me for evaluation  Below are my notes for this consultation  If you have questions, please do not hesitate to call me  I look forward to following your patient along with you  Sincerely,        [facility], DO        CC: No Recipients  [facility], DO  9/23/2020 12:13 PM  Addendum  Assessment/Plan:  Assessment/Plan   Diagnoses and all orders for this visit:    Facet arthropathy, cervical  -     Ambulatory referral to Sports Medicine  -     Ambulatory referral to Physical Therapy; Future  -     meloxicam (MOBIC) 15 mg tablet; Take 1 tablet (15 mg total) by mouth daily    Foraminal stenosis of cervical region  -     Ambulatory referral to Physical Therapy; Future    Degenerative disc disease, cervical  -     Ambulatory referral to Physical Therapy; Future    Radiculopathy, cervical region  -     Ambulatory referral to Physical Therapy; Future    Trapezius muscle spasm  -     Ambulatory referral to Physical Therapy; Future       73-year-old right-hand-dominant female with neck pain of more than 6 months duration  Discussed with patient physical exam, radiographs, impression and plan  X-rays of the cervical spine are noted for multilevel degenerative disc disease, facet arthropathy, and foraminal narrowing  Cervical spine is noted for midline tenderness C5-C7, and bilateral paraspinal tenderness  She has limited range of motion in all planes but most pronounced with side bending to both sides  There is positive axial load and negative Spurling's  She has intact strength, sensation, biceps reflex, and radial pulse in both upper extremities  Clinical impression is that she is symptomatic from degenerative changes in the cervical spine    I discussed treatment regimen of anti-inflammatory, supplements, and physical therapy  She is to take meloxicam 15 mg once daily with food consistently for 30 days and during that time not to take any ibuprofen or Aleve but may take Tylenol, start taking tumeric 500 mg twice daily, tart cherry 1000 mg daily, and Osteo Bi-Flex triple strength twice daily  She is to continue with physical therapy and do home exercises as directed  She will follow up in 6 weeks at which point she will be re-evaluated  Subjective:   Patient ID: Regina Thayer is a 70 y o  female  Chief Complaint   Patient presents with    Neck - Pain        79-year-old right-hand-dominant female presents for evaluation of neck pain of more than 6 months duration  She denies any particular trauma or inciting event  Pain described as localized to posterior aspect of the neck, gradual in onset, achy and sore, radiating to both shoulders, worse with turning her head, associated with numbness and tingling in the left upper extremity, associated stiffness and limited range of motion, and improved with return to neutral position  She reports having seen a doctor when she was living in [place] who prescribed her course of medication for 10 days which helped with her pain  She has since relocated to this area and did not continue treatment for her neck pain  She saw her primary care provider 2 weeks ago and was referred for x-rays of the cervical spine, which were noted for degenerative changes, and she was referred to physical therapy and Sports Medicine  She has been taking ibuprofen to help with pain  She had her 1st session of physical therapy today and while she was doing a stretching modality she felt a pop in the neck and since then has had dramatic improvement in her range of motion  Neck Pain   This is a chronic problem  The current episode started more than 1 month ago  The problem occurs daily  The problem has been gradually improving   Associated symptoms include arthralgias, neck pain and numbness  Pertinent negatives include no abdominal pain, chest pain, chills, fever, rash, sore throat or weakness  Exacerbated by:   Head turning  She has tried rest, NSAIDs and position changes (  Physical therapy) for the symptoms  The treatment provided mild relief  The following portions of the patient's history were reviewed and updated as appropriate: She  has a past medical history of Diabetes mellitus ([address] ), Hypertension, and Osteoporosis  She  has a past surgical history that includes  section  Her family history includes COPD in her mother; Diabetes in her mother; Substance Abuse in her mother  She  reports that she has never smoked  She has never used smokeless tobacco  She reports that she does not drink alcohol or use drugs  She has No Known Allergies       Review of Systems   Constitutional: Negative for chills and fever  HENT: Negative for sore throat  Eyes: Negative for visual disturbance  Respiratory: Negative for shortness of breath  Cardiovascular: Negative for chest pain  Gastrointestinal: Negative for abdominal pain  Genitourinary: Negative for flank pain  Musculoskeletal: Positive for arthralgias and neck pain  Skin: Negative for rash and wound  Neurological: Positive for numbness  Negative for weakness  Hematological: Does not bruise/bleed easily  Psychiatric/Behavioral: Negative for self-injury         Objective:  Vitals:    20 1136   BP: 119/73   Pulse: 74   Temp: 98 5 °F (36 9 °C)   Weight: 65 3 kg (144 lb)   Height: 5' 2" (1 575 m)     Back Exam     Reflexes   Biceps: normal    Comments:      Cervical spine  - midline tenderness C6-C7, bilateral paraspinal tenderness  -Limited range of motion all planes   -Positive axial load  -negative Spurling's  - normal strength, sensation, and biceps reflex in both upper extremities      Right Hand Exam     Muscle Strength   The patient has normal right wrist strength  Other   Sensation: normal  Pulse: present      Left Hand Exam     Muscle Strength   The patient has normal left wrist strength  Other   Sensation: normal  Pulse: present      Right Elbow Exam     Muscle Strength   Right elbow normal strength:  5/5 strength flexion and extension  Other   Sensation: normal      Left Elbow Exam     Muscle Strength   Left elbow normal strength:  5/5 strength flexion and extension  Other   Sensation: normal      Right Shoulder Exam     Muscle Strength   Abduction: 5/5     Other   Sensation: normal      Left Shoulder Exam     Muscle Strength   Abduction: 5/5     Other   Sensation: normal           Strength/Myotome Testing     Left Wrist/Hand   Normal wrist strength    Right Wrist/Hand   Normal wrist strength      Physical Exam  Vitals signs and nursing note reviewed  Constitutional:       General: She is not in acute distress  Appearance: She is well-developed  She is not ill-appearing or diaphoretic  HENT:      Head: Normocephalic  Eyes:      Conjunctiva/sclera: Conjunctivae normal    Neck:      Trachea: No tracheal deviation  Cardiovascular:      Rate and Rhythm: Normal rate  Pulmonary:      Effort: Pulmonary effort is normal  No respiratory distress  Abdominal:      General: There is no distension  Musculoskeletal:         General: Tenderness present  No swelling, deformity or signs of injury  Skin:     General: Skin is warm and dry  Coloration: Skin is not jaundiced or pale  Neurological:      Mental Status: She is alert and oriented to person, place, and time  Psychiatric:         Mood and Affect: Mood normal          Behavior: Behavior normal          Thought Content: Thought content normal          Judgment: Judgment normal          I have personally reviewed pertinent films in PACS and my interpretation is   Multilevel degenerative disc disease, facet arthropathy, and foraminal narrowing

## 2020-09-29 ENCOUNTER — OFFICE VISIT (OUTPATIENT)
Dept: DERMATOLOGY | Facility: CLINIC | Age: 80
End: 2020-09-29
Payer: COMMERCIAL

## 2020-09-29 ENCOUNTER — OFFICE VISIT (OUTPATIENT)
Dept: PHYSICAL THERAPY | Facility: CLINIC | Age: 80
End: 2020-09-29
Payer: COMMERCIAL

## 2020-09-29 VITALS — BODY MASS INDEX: 27.46 KG/M2 | WEIGHT: 149.2 LBS | HEIGHT: 62 IN | TEMPERATURE: 97.2 F

## 2020-09-29 DIAGNOSIS — Z12.83 SKIN CANCER SCREENING: ICD-10-CM

## 2020-09-29 DIAGNOSIS — L82.1 SEBORRHEIC KERATOSES: ICD-10-CM

## 2020-09-29 DIAGNOSIS — L81.4 LENTIGO: ICD-10-CM

## 2020-09-29 DIAGNOSIS — M54.2 CHRONIC NECK PAIN: Primary | ICD-10-CM

## 2020-09-29 DIAGNOSIS — G89.29 CHRONIC NECK PAIN: Primary | ICD-10-CM

## 2020-09-29 DIAGNOSIS — D48.5 NEOPLASM OF UNCERTAIN BEHAVIOR OF SKIN: ICD-10-CM

## 2020-09-29 DIAGNOSIS — L85.3 XEROSIS OF SKIN: Primary | ICD-10-CM

## 2020-09-29 DIAGNOSIS — L57.0 ACTINIC KERATOSIS: ICD-10-CM

## 2020-09-29 PROCEDURE — 11102 TANGNTL BX SKIN SINGLE LES: CPT | Performed by: DERMATOLOGY

## 2020-09-29 PROCEDURE — 1036F TOBACCO NON-USER: CPT | Performed by: DERMATOLOGY

## 2020-09-29 PROCEDURE — 1160F RVW MEDS BY RX/DR IN RCRD: CPT | Performed by: DERMATOLOGY

## 2020-09-29 PROCEDURE — 97112 NEUROMUSCULAR REEDUCATION: CPT | Performed by: PHYSICAL THERAPIST

## 2020-09-29 PROCEDURE — 88305 TISSUE EXAM BY PATHOLOGIST: CPT | Performed by: STUDENT IN AN ORGANIZED HEALTH CARE EDUCATION/TRAINING PROGRAM

## 2020-09-29 PROCEDURE — 97110 THERAPEUTIC EXERCISES: CPT | Performed by: PHYSICAL THERAPIST

## 2020-09-29 PROCEDURE — 99204 OFFICE O/P NEW MOD 45 MIN: CPT | Performed by: DERMATOLOGY

## 2020-09-29 PROCEDURE — 17003 DESTRUCT PREMALG LES 2-14: CPT | Performed by: DERMATOLOGY

## 2020-09-29 PROCEDURE — 17000 DESTRUCT PREMALG LESION: CPT | Performed by: DERMATOLOGY

## 2020-09-29 RX ORDER — FLUOROURACIL 50 MG/G
CREAM TOPICAL 2 TIMES DAILY
Qty: 40 G | Refills: 0 | Status: SHIPPED | OUTPATIENT
Start: 2020-09-29 | End: 2021-10-18 | Stop reason: ALTCHOICE

## 2020-09-29 NOTE — PROGRESS NOTES
Daily Note     Today's date: 2020  Patient name: Kaylin Lamb  : 1940  MRN: [id number]  Referring provider: VERONIQUE Umanzor  Dx:   Encounter Diagnosis     ICD-10-CM    1  Chronic neck pain  M54 2     G89 29        Start Time: 1051  Stop Time: 1135  Total time in clinic (min): 44 minutes    Subjective: Patient reports that her cervical pain has been significantly improved since her IE  Objective: See treatment diary below      Assessment: Tolerated treatment fair  Patient demonstrated fatigue post treatment and would benefit from continued PT  Patient demonstrated improved independence and understanding of current HEP  Patient overall reporting less pain and improved cervical ROM  Plan: Continue per plan of care  Progress treatment as tolerated  Precautions: anterolisthesis of C7/T1, DM, HTN, prior left hand fx        Manuals  (IE)            MET to improve rotation at C1/C2 bilaterally NV NV           STM to B UT/LS NV NV                                     Neuro Re-Ed             Seated:             -cervical retraction 10 x 10" NV 10x10"           - scapular retraction 10 x 10" NV 10x10"           - slouch and overcorrect 10 x 10" NV 10x10"                        TB:             - rows             - pulldowns             - no money                                       Ther Ex             UBE posterior standing 6 mins NV 6 mins                        Corner stretch 4 x 30" NV 4x30"                        Seated:             - pulleys with cervical rotation toward down arm 3 mins  2" NV 3 mins 2"                        Prone: (towel roll under forehead)             - I's             - T's             - Y's                          Ther Activity                                       Gait Training                                       Modalities             MH as needed

## 2020-10-01 ENCOUNTER — OFFICE VISIT (OUTPATIENT)
Dept: PHYSICAL THERAPY | Facility: CLINIC | Age: 80
End: 2020-10-01
Payer: COMMERCIAL

## 2020-10-01 DIAGNOSIS — G89.29 CHRONIC NECK PAIN: Primary | ICD-10-CM

## 2020-10-01 DIAGNOSIS — M54.2 CHRONIC NECK PAIN: Primary | ICD-10-CM

## 2020-10-01 PROCEDURE — 97110 THERAPEUTIC EXERCISES: CPT | Performed by: PHYSICAL THERAPIST

## 2020-10-01 PROCEDURE — 97112 NEUROMUSCULAR REEDUCATION: CPT | Performed by: PHYSICAL THERAPIST

## 2020-10-05 ENCOUNTER — OFFICE VISIT (OUTPATIENT)
Dept: PHYSICAL THERAPY | Facility: CLINIC | Age: 80
End: 2020-10-05
Payer: COMMERCIAL

## 2020-10-05 DIAGNOSIS — G89.29 CHRONIC NECK PAIN: Primary | ICD-10-CM

## 2020-10-05 DIAGNOSIS — M54.2 CHRONIC NECK PAIN: Primary | ICD-10-CM

## 2020-10-05 PROCEDURE — 97110 THERAPEUTIC EXERCISES: CPT | Performed by: PHYSICAL THERAPIST

## 2020-10-05 PROCEDURE — 97112 NEUROMUSCULAR REEDUCATION: CPT | Performed by: PHYSICAL THERAPIST

## 2020-10-08 ENCOUNTER — OFFICE VISIT (OUTPATIENT)
Dept: PHYSICAL THERAPY | Facility: CLINIC | Age: 80
End: 2020-10-08
Payer: COMMERCIAL

## 2020-10-08 DIAGNOSIS — M54.2 CHRONIC NECK PAIN: Primary | ICD-10-CM

## 2020-10-08 DIAGNOSIS — G89.29 CHRONIC NECK PAIN: Primary | ICD-10-CM

## 2020-10-08 PROCEDURE — 97112 NEUROMUSCULAR REEDUCATION: CPT

## 2020-10-08 PROCEDURE — 97110 THERAPEUTIC EXERCISES: CPT

## 2020-10-09 ENCOUNTER — TELEPHONE (OUTPATIENT)
Dept: DERMATOLOGY | Facility: CLINIC | Age: 80
End: 2020-10-09

## 2020-10-10 DIAGNOSIS — B35.1 TOENAIL FUNGUS: ICD-10-CM

## 2020-10-10 DIAGNOSIS — Z00.00 MEDICARE ANNUAL WELLNESS VISIT, SUBSEQUENT: ICD-10-CM

## 2020-10-10 DIAGNOSIS — E11.9 TYPE 2 DIABETES MELLITUS WITHOUT COMPLICATION, WITHOUT LONG-TERM CURRENT USE OF INSULIN (HCC): ICD-10-CM

## 2020-10-10 DIAGNOSIS — E78.2 HYPERLIPIDEMIA, MIXED: ICD-10-CM

## 2020-10-10 DIAGNOSIS — M81.0 AGE-RELATED OSTEOPOROSIS WITHOUT CURRENT PATHOLOGICAL FRACTURE: ICD-10-CM

## 2020-10-10 DIAGNOSIS — M1A.4710 OTHER SECONDARY CHRONIC GOUT OF RIGHT ANKLE WITHOUT TOPHUS: ICD-10-CM

## 2020-10-10 DIAGNOSIS — Z12.83 SKIN CANCER SCREENING: ICD-10-CM

## 2020-10-10 DIAGNOSIS — I10 ESSENTIAL HYPERTENSION: ICD-10-CM

## 2020-10-10 DIAGNOSIS — M43.6 TORTICOLLIS: ICD-10-CM

## 2020-10-10 DIAGNOSIS — Z13.220 LIPID SCREENING: ICD-10-CM

## 2020-10-12 ENCOUNTER — OFFICE VISIT (OUTPATIENT)
Dept: PHYSICAL THERAPY | Facility: CLINIC | Age: 80
End: 2020-10-12
Payer: COMMERCIAL

## 2020-10-12 DIAGNOSIS — G89.29 CHRONIC NECK PAIN: Primary | ICD-10-CM

## 2020-10-12 DIAGNOSIS — M54.2 CHRONIC NECK PAIN: Primary | ICD-10-CM

## 2020-10-12 PROCEDURE — 97112 NEUROMUSCULAR REEDUCATION: CPT | Performed by: PHYSICAL THERAPIST

## 2020-10-12 PROCEDURE — 97110 THERAPEUTIC EXERCISES: CPT | Performed by: PHYSICAL THERAPIST

## 2020-10-12 RX ORDER — ACARBOSE 50 MG/1
TABLET ORAL
Qty: 90 TABLET | Refills: 1 | Status: SHIPPED | OUTPATIENT
Start: 2020-10-12 | End: 2021-02-16

## 2020-10-12 RX ORDER — GLIMEPIRIDE 4 MG/1
TABLET ORAL
Qty: 90 TABLET | Refills: 1 | Status: SHIPPED | OUTPATIENT
Start: 2020-10-12 | End: 2021-02-16

## 2020-10-15 ENCOUNTER — OFFICE VISIT (OUTPATIENT)
Dept: PHYSICAL THERAPY | Facility: CLINIC | Age: 80
End: 2020-10-15
Payer: COMMERCIAL

## 2020-10-15 DIAGNOSIS — G89.29 CHRONIC NECK PAIN: Primary | ICD-10-CM

## 2020-10-15 DIAGNOSIS — M54.2 CHRONIC NECK PAIN: Primary | ICD-10-CM

## 2020-10-15 PROCEDURE — 97112 NEUROMUSCULAR REEDUCATION: CPT

## 2020-10-15 PROCEDURE — 97110 THERAPEUTIC EXERCISES: CPT

## 2020-10-19 ENCOUNTER — EVALUATION (OUTPATIENT)
Dept: PHYSICAL THERAPY | Facility: CLINIC | Age: 80
End: 2020-10-19
Payer: COMMERCIAL

## 2020-10-19 DIAGNOSIS — G89.29 CHRONIC NECK PAIN: Primary | ICD-10-CM

## 2020-10-19 DIAGNOSIS — M54.2 CHRONIC NECK PAIN: Primary | ICD-10-CM

## 2020-10-19 PROCEDURE — 97110 THERAPEUTIC EXERCISES: CPT | Performed by: PHYSICAL THERAPIST

## 2020-10-22 ENCOUNTER — APPOINTMENT (OUTPATIENT)
Dept: PHYSICAL THERAPY | Facility: CLINIC | Age: 80
End: 2020-10-22
Payer: COMMERCIAL

## 2020-10-26 ENCOUNTER — APPOINTMENT (OUTPATIENT)
Dept: PHYSICAL THERAPY | Facility: CLINIC | Age: 80
End: 2020-10-26
Payer: COMMERCIAL

## 2020-10-27 ENCOUNTER — PROCEDURE VISIT (OUTPATIENT)
Dept: DERMATOLOGY | Facility: CLINIC | Age: 80
End: 2020-10-27
Payer: COMMERCIAL

## 2020-10-27 ENCOUNTER — TELEPHONE (OUTPATIENT)
Dept: DERMATOLOGY | Facility: CLINIC | Age: 80
End: 2020-10-27

## 2020-10-27 VITALS — WEIGHT: 150.4 LBS | TEMPERATURE: 97.3 F | BODY MASS INDEX: 27.68 KG/M2 | HEIGHT: 62 IN

## 2020-10-27 DIAGNOSIS — C44.629 SQUAMOUS CELL CARCINOMA OF SKIN OF LEFT ELBOW: Primary | ICD-10-CM

## 2020-10-27 PROCEDURE — 11603 EXC TR-EXT MAL+MARG 2.1-3 CM: CPT | Performed by: DERMATOLOGY

## 2020-10-27 PROCEDURE — 88305 TISSUE EXAM BY PATHOLOGIST: CPT | Performed by: STUDENT IN AN ORGANIZED HEALTH CARE EDUCATION/TRAINING PROGRAM

## 2020-10-27 PROCEDURE — 12031 INTMD RPR S/A/T/EXT 2.5 CM/<: CPT | Performed by: DERMATOLOGY

## 2020-10-29 ENCOUNTER — APPOINTMENT (OUTPATIENT)
Dept: PHYSICAL THERAPY | Facility: CLINIC | Age: 80
End: 2020-10-29
Payer: COMMERCIAL

## 2020-11-03 DIAGNOSIS — E11.65 UNCONTROLLED TYPE 2 DIABETES MELLITUS WITH HYPERGLYCEMIA (HCC): ICD-10-CM

## 2020-11-03 RX ORDER — DULAGLUTIDE 0.75 MG/.5ML
INJECTION, SOLUTION SUBCUTANEOUS
Qty: 2 ML | Refills: 2 | Status: SHIPPED | OUTPATIENT
Start: 2020-11-03 | End: 2021-01-12 | Stop reason: ALTCHOICE

## 2020-11-09 DIAGNOSIS — Z12.83 SKIN CANCER SCREENING: ICD-10-CM

## 2020-11-09 DIAGNOSIS — E11.9 TYPE 2 DIABETES MELLITUS WITHOUT COMPLICATION, WITHOUT LONG-TERM CURRENT USE OF INSULIN (HCC): ICD-10-CM

## 2020-11-09 DIAGNOSIS — E78.2 HYPERLIPIDEMIA, MIXED: ICD-10-CM

## 2020-11-09 DIAGNOSIS — Z00.00 MEDICARE ANNUAL WELLNESS VISIT, SUBSEQUENT: ICD-10-CM

## 2020-11-09 DIAGNOSIS — M1A.4710 OTHER SECONDARY CHRONIC GOUT OF RIGHT ANKLE WITHOUT TOPHUS: ICD-10-CM

## 2020-11-09 DIAGNOSIS — I10 ESSENTIAL HYPERTENSION: ICD-10-CM

## 2020-11-09 DIAGNOSIS — Z13.220 LIPID SCREENING: ICD-10-CM

## 2020-11-09 DIAGNOSIS — M81.0 AGE-RELATED OSTEOPOROSIS WITHOUT CURRENT PATHOLOGICAL FRACTURE: ICD-10-CM

## 2020-11-09 DIAGNOSIS — B35.1 TOENAIL FUNGUS: ICD-10-CM

## 2020-11-09 DIAGNOSIS — M43.6 TORTICOLLIS: ICD-10-CM

## 2020-11-10 ENCOUNTER — OFFICE VISIT (OUTPATIENT)
Dept: DERMATOLOGY | Facility: CLINIC | Age: 80
End: 2020-11-10

## 2020-11-10 VITALS — TEMPERATURE: 97.1 F | WEIGHT: 150 LBS | HEIGHT: 62 IN | BODY MASS INDEX: 27.6 KG/M2

## 2020-11-10 DIAGNOSIS — Z48.02 VISIT FOR SUTURE REMOVAL: Primary | ICD-10-CM

## 2020-11-10 PROCEDURE — RECHECK: Performed by: DERMATOLOGY

## 2020-11-16 DIAGNOSIS — M81.0 AGE-RELATED OSTEOPOROSIS WITHOUT CURRENT PATHOLOGICAL FRACTURE: ICD-10-CM

## 2020-11-16 DIAGNOSIS — I10 ESSENTIAL HYPERTENSION: ICD-10-CM

## 2020-11-16 DIAGNOSIS — B35.1 TOENAIL FUNGUS: ICD-10-CM

## 2020-11-16 DIAGNOSIS — Z12.83 SKIN CANCER SCREENING: ICD-10-CM

## 2020-11-16 DIAGNOSIS — E11.9 TYPE 2 DIABETES MELLITUS WITHOUT COMPLICATION, WITHOUT LONG-TERM CURRENT USE OF INSULIN (HCC): ICD-10-CM

## 2020-11-16 DIAGNOSIS — Z00.00 MEDICARE ANNUAL WELLNESS VISIT, SUBSEQUENT: ICD-10-CM

## 2020-11-16 DIAGNOSIS — M1A.4710 OTHER SECONDARY CHRONIC GOUT OF RIGHT ANKLE WITHOUT TOPHUS: ICD-10-CM

## 2020-11-16 DIAGNOSIS — M43.6 TORTICOLLIS: ICD-10-CM

## 2020-11-16 DIAGNOSIS — E78.2 HYPERLIPIDEMIA, MIXED: ICD-10-CM

## 2020-11-16 DIAGNOSIS — Z13.220 LIPID SCREENING: ICD-10-CM

## 2020-11-16 RX ORDER — ALENDRONATE SODIUM 70 MG/1
TABLET ORAL
Qty: 12 TABLET | Refills: 1 | Status: SHIPPED | OUTPATIENT
Start: 2020-11-16 | End: 2021-10-18 | Stop reason: ALTCHOICE

## 2020-11-21 DIAGNOSIS — M47.812 FACET ARTHROPATHY, CERVICAL: ICD-10-CM

## 2020-12-21 ENCOUNTER — TELEPHONE (OUTPATIENT)
Dept: OBGYN CLINIC | Facility: HOSPITAL | Age: 80
End: 2020-12-21

## 2020-12-23 DIAGNOSIS — M47.812 FACET ARTHROPATHY, CERVICAL: ICD-10-CM

## 2020-12-23 RX ORDER — MELOXICAM 15 MG/1
15 TABLET ORAL DAILY
Qty: 30 TABLET | Refills: 0 | Status: SHIPPED | OUTPATIENT
Start: 2020-12-23 | End: 2021-07-16

## 2020-12-24 DIAGNOSIS — E11.65 UNCONTROLLED TYPE 2 DIABETES MELLITUS WITH HYPERGLYCEMIA (HCC): ICD-10-CM

## 2020-12-24 RX ORDER — LANCETS 33 GAUGE
EACH MISCELLANEOUS
Qty: 100 EACH | Refills: 3 | Status: SHIPPED | OUTPATIENT
Start: 2020-12-24 | End: 2021-07-16

## 2020-12-30 ENCOUNTER — OFFICE VISIT (OUTPATIENT)
Dept: OBGYN CLINIC | Facility: CLINIC | Age: 80
End: 2020-12-30
Payer: COMMERCIAL

## 2020-12-30 VITALS
SYSTOLIC BLOOD PRESSURE: 130 MMHG | BODY MASS INDEX: 27.29 KG/M2 | WEIGHT: 154 LBS | DIASTOLIC BLOOD PRESSURE: 82 MMHG | HEART RATE: 86 BPM | HEIGHT: 63 IN

## 2020-12-30 DIAGNOSIS — M47.812 FACET ARTHROPATHY, CERVICAL: Primary | ICD-10-CM

## 2020-12-30 PROCEDURE — 1160F RVW MEDS BY RX/DR IN RCRD: CPT | Performed by: FAMILY MEDICINE

## 2020-12-30 PROCEDURE — 3075F SYST BP GE 130 - 139MM HG: CPT | Performed by: FAMILY MEDICINE

## 2020-12-30 PROCEDURE — 3079F DIAST BP 80-89 MM HG: CPT | Performed by: FAMILY MEDICINE

## 2020-12-30 PROCEDURE — 99213 OFFICE O/P EST LOW 20 MIN: CPT | Performed by: FAMILY MEDICINE

## 2020-12-30 PROCEDURE — 1036F TOBACCO NON-USER: CPT | Performed by: FAMILY MEDICINE

## 2021-01-06 ENCOUNTER — APPOINTMENT (OUTPATIENT)
Dept: LAB | Facility: CLINIC | Age: 81
End: 2021-01-06
Payer: COMMERCIAL

## 2021-01-06 DIAGNOSIS — E83.52 HYPERCALCEMIA: ICD-10-CM

## 2021-01-06 DIAGNOSIS — D64.9 ANEMIA, UNSPECIFIED TYPE: ICD-10-CM

## 2021-01-06 DIAGNOSIS — E11.9 TYPE 2 DIABETES MELLITUS WITHOUT COMPLICATION, WITHOUT LONG-TERM CURRENT USE OF INSULIN (HCC): ICD-10-CM

## 2021-01-06 DIAGNOSIS — E78.2 HYPERLIPIDEMIA, MIXED: ICD-10-CM

## 2021-01-06 DIAGNOSIS — M81.0 AGE-RELATED OSTEOPOROSIS WITHOUT CURRENT PATHOLOGICAL FRACTURE: ICD-10-CM

## 2021-01-06 DIAGNOSIS — M1A.4710 OTHER SECONDARY CHRONIC GOUT OF RIGHT ANKLE WITHOUT TOPHUS: ICD-10-CM

## 2021-01-06 LAB
ALBUMIN SERPL BCP-MCNC: 3.6 G/DL (ref 3.5–5)
ALP SERPL-CCNC: 50 U/L (ref 46–116)
ALT SERPL W P-5'-P-CCNC: 21 U/L (ref 12–78)
ANION GAP SERPL CALCULATED.3IONS-SCNC: 6 MMOL/L (ref 4–13)
AST SERPL W P-5'-P-CCNC: 15 U/L (ref 5–45)
BASOPHILS # BLD AUTO: 0.04 THOUSANDS/ΜL (ref 0–0.1)
BASOPHILS NFR BLD AUTO: 1 % (ref 0–1)
BILIRUB SERPL-MCNC: 0.25 MG/DL (ref 0.2–1)
BUN SERPL-MCNC: 26 MG/DL (ref 5–25)
CALCIUM SERPL-MCNC: 9.8 MG/DL (ref 8.3–10.1)
CHLORIDE SERPL-SCNC: 107 MMOL/L (ref 100–108)
CHOLEST SERPL-MCNC: 128 MG/DL (ref 50–200)
CO2 SERPL-SCNC: 29 MMOL/L (ref 21–32)
CREAT SERPL-MCNC: 0.88 MG/DL (ref 0.6–1.3)
EOSINOPHIL # BLD AUTO: 0.3 THOUSAND/ΜL (ref 0–0.61)
EOSINOPHIL NFR BLD AUTO: 4 % (ref 0–6)
ERYTHROCYTE [DISTWIDTH] IN BLOOD BY AUTOMATED COUNT: 14.9 % (ref 11.6–15.1)
EST. AVERAGE GLUCOSE BLD GHB EST-MCNC: 117 MG/DL
FERRITIN SERPL-MCNC: 15 NG/ML (ref 8–388)
GFR SERPL CREATININE-BSD FRML MDRD: 63 ML/MIN/1.73SQ M
GLUCOSE P FAST SERPL-MCNC: 109 MG/DL (ref 65–99)
HBA1C MFR BLD: 5.7 %
HCT VFR BLD AUTO: 40.1 % (ref 34.8–46.1)
HDLC SERPL-MCNC: 56 MG/DL
HGB BLD-MCNC: 11.9 G/DL (ref 11.5–15.4)
IMM GRANULOCYTES # BLD AUTO: 0.02 THOUSAND/UL (ref 0–0.2)
IMM GRANULOCYTES NFR BLD AUTO: 0 % (ref 0–2)
IRON SATN MFR SERPL: 14 %
IRON SERPL-MCNC: 45 UG/DL (ref 50–170)
LDLC SERPL CALC-MCNC: 50 MG/DL (ref 0–100)
LYMPHOCYTES # BLD AUTO: 2.99 THOUSANDS/ΜL (ref 0.6–4.47)
LYMPHOCYTES NFR BLD AUTO: 41 % (ref 14–44)
MCH RBC QN AUTO: 27.9 PG (ref 26.8–34.3)
MCHC RBC AUTO-ENTMCNC: 29.7 G/DL (ref 31.4–37.4)
MCV RBC AUTO: 94 FL (ref 82–98)
MONOCYTES # BLD AUTO: 0.58 THOUSAND/ΜL (ref 0.17–1.22)
MONOCYTES NFR BLD AUTO: 8 % (ref 4–12)
NEUTROPHILS # BLD AUTO: 3.34 THOUSANDS/ΜL (ref 1.85–7.62)
NEUTS SEG NFR BLD AUTO: 46 % (ref 43–75)
NONHDLC SERPL-MCNC: 72 MG/DL
NRBC BLD AUTO-RTO: 0 /100 WBCS
PLATELET # BLD AUTO: 286 THOUSANDS/UL (ref 149–390)
PMV BLD AUTO: 9.9 FL (ref 8.9–12.7)
POTASSIUM SERPL-SCNC: 3.8 MMOL/L (ref 3.5–5.3)
PROT SERPL-MCNC: 7.7 G/DL (ref 6.4–8.2)
PTH-INTACT SERPL-MCNC: 37.1 PG/ML (ref 18.4–80.1)
RBC # BLD AUTO: 4.27 MILLION/UL (ref 3.81–5.12)
SODIUM SERPL-SCNC: 142 MMOL/L (ref 136–145)
TIBC SERPL-MCNC: 332 UG/DL (ref 250–450)
TRIGL SERPL-MCNC: 110 MG/DL
URATE SERPL-MCNC: 3.9 MG/DL (ref 2–6.8)
WBC # BLD AUTO: 7.27 THOUSAND/UL (ref 4.31–10.16)

## 2021-01-06 PROCEDURE — 82728 ASSAY OF FERRITIN: CPT

## 2021-01-06 PROCEDURE — 83036 HEMOGLOBIN GLYCOSYLATED A1C: CPT

## 2021-01-06 PROCEDURE — 83550 IRON BINDING TEST: CPT

## 2021-01-06 PROCEDURE — 85025 COMPLETE CBC W/AUTO DIFF WBC: CPT

## 2021-01-06 PROCEDURE — 80061 LIPID PANEL: CPT

## 2021-01-06 PROCEDURE — 80053 COMPREHEN METABOLIC PANEL: CPT

## 2021-01-06 PROCEDURE — 84550 ASSAY OF BLOOD/URIC ACID: CPT

## 2021-01-06 PROCEDURE — 83970 ASSAY OF PARATHORMONE: CPT

## 2021-01-06 PROCEDURE — 83540 ASSAY OF IRON: CPT

## 2021-01-06 PROCEDURE — 36415 COLL VENOUS BLD VENIPUNCTURE: CPT

## 2021-01-12 ENCOUNTER — OFFICE VISIT (OUTPATIENT)
Dept: FAMILY MEDICINE CLINIC | Facility: CLINIC | Age: 81
End: 2021-01-12
Payer: COMMERCIAL

## 2021-01-12 VITALS
OXYGEN SATURATION: 98 % | BODY MASS INDEX: 26.86 KG/M2 | SYSTOLIC BLOOD PRESSURE: 128 MMHG | WEIGHT: 151.6 LBS | HEART RATE: 83 BPM | HEIGHT: 63 IN | TEMPERATURE: 96.4 F | DIASTOLIC BLOOD PRESSURE: 78 MMHG

## 2021-01-12 DIAGNOSIS — E11.9 TYPE 2 DIABETES MELLITUS WITHOUT COMPLICATION, WITHOUT LONG-TERM CURRENT USE OF INSULIN (HCC): ICD-10-CM

## 2021-01-12 DIAGNOSIS — M1A.4710 OTHER SECONDARY CHRONIC GOUT OF RIGHT ANKLE WITHOUT TOPHUS: ICD-10-CM

## 2021-01-12 DIAGNOSIS — I10 ESSENTIAL HYPERTENSION: Primary | ICD-10-CM

## 2021-01-12 DIAGNOSIS — E78.2 HYPERLIPIDEMIA, MIXED: ICD-10-CM

## 2021-01-12 DIAGNOSIS — E61.1 IRON DEFICIENCY: ICD-10-CM

## 2021-01-12 DIAGNOSIS — D64.9 ANEMIA, UNSPECIFIED TYPE: ICD-10-CM

## 2021-01-12 DIAGNOSIS — E83.52 HYPERCALCEMIA: ICD-10-CM

## 2021-01-12 PROCEDURE — 99214 OFFICE O/P EST MOD 30 MIN: CPT | Performed by: PHYSICIAN ASSISTANT

## 2021-01-12 PROCEDURE — 1160F RVW MEDS BY RX/DR IN RCRD: CPT | Performed by: PHYSICIAN ASSISTANT

## 2021-01-12 PROCEDURE — 3078F DIAST BP <80 MM HG: CPT | Performed by: PHYSICIAN ASSISTANT

## 2021-01-12 PROCEDURE — 3074F SYST BP LT 130 MM HG: CPT | Performed by: PHYSICIAN ASSISTANT

## 2021-01-12 PROCEDURE — 1036F TOBACCO NON-USER: CPT | Performed by: PHYSICIAN ASSISTANT

## 2021-01-12 RX ORDER — FERROUS SULFATE TAB EC 324 MG (65 MG FE EQUIVALENT) 324 (65 FE) MG
324 TABLET DELAYED RESPONSE ORAL
Qty: 30 TABLET | Refills: 1 | Status: SHIPPED | OUTPATIENT
Start: 2021-01-12 | End: 2021-02-12

## 2021-01-12 NOTE — PROGRESS NOTES
Assessment/Plan:       Problem List Items Addressed This Visit        Endocrine    Type 2 diabetes mellitus without complication, without long-term current use of insulin ([facility] [address] )       Cardiovascular and Mediastinum    Hypertension - Primary    Relevant Orders    HEMOGLOBIN A1C W/ EAG ESTIMATION    CBC and differential       Other    Hyperlipidemia, mixed    Relevant Orders    Lipid Panel with Direct LDL reflex    Gout    Anemia    Relevant Medications    ferrous sulfate 324 (65 Fe) mg    Hypercalcemia      Other Visit Diagnoses     Iron deficiency        Relevant Medications    ferrous sulfate 324 (65 Fe) mg    Other Relevant Orders    Iron Panel (Includes Ferritin, Iron Sat%, Iron, and TIBC)    CBC and differential        DM: a1c 5 7, decrease amaryl to 2mg daily with breakfast as pt has dizziness with glucose approaching 80, monitor blood glucose, continue other meds as previous  HTN: well controlled, no change to current regimen  Start iron supplement daily, recheck iron panel in 3 months  Follow up in 3 months or earlier as needed  Discussed red flag signs associated with dizziness and return precautions     Subjective:      Patient ID: Emma Hendricks is a 68 y o  female  Pt presents for follow up    DM: pt on glimeperide, metformin, januvia  a1c down to 5 7  caregiver notes that her blood sugars have been trending down close to 80 in the mornings and as her sugars have been lower pt has noticed she has also felt dizzy at times after taking her morning diabetes medication  She denies associated CP, SOB, palpitations, sweating, numbness, tingling, weakness, syncope  The dizziness does resolve spontaneously      HTN: on benazepril hctz, nifedepine and coreg, 128/78 today   HLD: well controlled on statin   Gout: no flares, normal uric acid   Hypercalcemia: normalized, normal PTH  Anemia: normal H/H, iron slightly depressed    Besides the intermittent dizziness, she has no acute complaints       The following portions of the patient's history were reviewed and updated as appropriate:   She  has a past medical history of Diabetes mellitus ([facility] [address] ), Hypertension, and Osteoporosis  She   Patient Active Problem List    Diagnosis Date Noted    Need for pneumococcal vaccine 09/10/2020    Chronic neck pain 09/10/2020    Anemia 09/10/2020    Hypercalcemia 09/10/2020    Skin cancer screening 2020    Hyperlipidemia, mixed 2020    Age-related osteoporosis without current pathological fracture 2020    Gout 2020    Toenail fungus 2020    Torticollis 2020    Type 2 diabetes mellitus without complication, without long-term current use of insulin ([facility] [address] ) 2020    Hypertension 2020     She  has a past surgical history that includes  section  Her family history includes COPD in her mother; Diabetes in her mother; Substance Abuse in her mother  She  reports that she has never smoked  She has never used smokeless tobacco  She reports that she does not drink alcohol or use drugs    Current Outpatient Medications   Medication Sig Dispense Refill    acarbose (PRECOSE) 50 mg tablet TAKE 1 TABLET BY MOUTH DAILY WITH BREAKFAST 90 tablet 1    Alcohol Swabs (Pharmacist Choice Alcohol) PADS Use as directed      alendronate (FOSAMAX) 70 mg tablet TAKE 1 TABLET BY MOUTH EVERY 7 DAYS 12 tablet 1    benazepril-hydrochlorthiazide (LOTENSIN HCT) 20-12 5 MG per tablet Take 1 tablet by mouth daily 90 tablet 2    carvedilol (COREG) 6 25 mg tablet Take 1 tablet (6 25 mg total) by mouth 2 (two) times a day with meals 180 tablet 3    Comfort EZ Pen Needles 32G X 4 MM MISC BY DOES NOT APPLY ROUTE ONCE A WEEK 100 each 3    ferrous sulfate 324 (65 Fe) mg Take 1 tablet (324 mg total) by mouth daily before breakfast 30 tablet 1    fluorouracil (EFUDEX) 5 % cream Apply topically 2 (two) times a day For 2 weeks on scalp 40 g 0    glimepiride (AMARYL) 4 mg tablet TAKE 1 TABLET BY MOUTH DAILY WITH BREAKFAST 90 tablet 1    glucose blood (OneTouch Verio) test strip Test sugar daily 100 each 3    Januvia 100 MG tablet TAKE ONE TABLET BY MOUTH DAILY 90 tablet 3    Lancet Devices (Adjustable Lancing Device) MISC MEDICARE B      [employer] FINEPOINT LANCETS MISC by Does not apply route daily 100 each 3    meloxicam (MOBIC) 15 mg tablet Take 1 tablet (15 mg total) by mouth daily 30 tablet 0    metFORMIN (GLUCOPHAGE) 850 mg tablet TAKE ONE TABLET BY MOUTH TWICE DAILY WITH MEALS 180 tablet 1    Multiple Vitamins-Minerals (PreserVision AREDS 2) CAPS TAKE 2 CAPSULES BY MOUTH DAILY IN THE MORNING  PATIENT BRINGS STOCK FROM HOME      NIFEdipine ER (ADALAT CC) 30 MG 24 hr tablet TAKE ONE TABLET BY MOUTH DAILY 90 tablet 3    simvastatin (ZOCOR) 10 mg tablet Take 1 tablet (10 mg total) by mouth daily at bedtime 90 tablet 1    vitamin B-12 (VITAMIN B-12) 1,000 mcg tablet TAKE ONE TABLET BY MOUTH DAILY IN THE MORNING  PATIENT BRINGS STOCK FROM HOME       No current facility-administered medications for this visit        Current Outpatient Medications on File Prior to Visit   Medication Sig    acarbose (PRECOSE) 50 mg tablet TAKE 1 TABLET BY MOUTH DAILY WITH BREAKFAST    Alcohol Swabs (Pharmacist Choice Alcohol) PADS Use as directed    alendronate (FOSAMAX) 70 mg tablet TAKE 1 TABLET BY MOUTH EVERY 7 DAYS    benazepril-hydrochlorthiazide (LOTENSIN HCT) 20-12 5 MG per tablet Take 1 tablet by mouth daily    carvedilol (COREG) 6 25 mg tablet Take 1 tablet (6 25 mg total) by mouth 2 (two) times a day with meals    Comfort EZ Pen Needles 32G X 4 MM MISC BY DOES NOT APPLY ROUTE ONCE A WEEK    fluorouracil (EFUDEX) 5 % cream Apply topically 2 (two) times a day For 2 weeks on scalp    glimepiride (AMARYL) 4 mg tablet TAKE 1 TABLET BY MOUTH DAILY WITH BREAKFAST    glucose blood (OneTouch Verio) test strip Test sugar daily    Januvia 100 MG tablet TAKE ONE TABLET BY MOUTH DAILY    Lancet Devices (Adjustable Lancing Device) MIS MEDICARE B    L[employer]ESCAN FINEPOINT LANCETS MISC by Does not apply route daily    meloxicam (MOBIC) 15 mg tablet Take 1 tablet (15 mg total) by mouth daily    metFORMIN (GLUCOPHAGE) 850 mg tablet TAKE ONE TABLET BY MOUTH TWICE DAILY WITH MEALS    Multiple Vitamins-Minerals (PreserVision AREDS 2) CAPS TAKE 2 CAPSULES BY MOUTH DAILY IN THE MORNING  PATIENT BRINGS STOCK FROM HOME    NIFEdipine ER (ADALAT CC) 30 MG 24 hr tablet TAKE ONE TABLET BY MOUTH DAILY    simvastatin (ZOCOR) 10 mg tablet Take 1 tablet (10 mg total) by mouth daily at bedtime    vitamin B-12 (VITAMIN B-12) 1,000 mcg tablet TAKE ONE TABLET BY MOUTH DAILY IN THE MORNING  PATIENT BRINGS STOCK FROM HOME    [DISCONTINUED] Trulicity 1 10 WQ/3 1XN SOPN INJECT 0 5 ML (0 75 MG TOTAL) UNDER THE SKIN ONCE A WEEK     No current facility-administered medications on file prior to visit  She has No Known Allergies       Review of Systems   Constitutional: Negative for chills, fatigue and fever  HENT: Negative for congestion, ear pain, hearing loss, nosebleeds, postnasal drip, rhinorrhea, sinus pressure, sinus pain, sneezing and sore throat  Eyes: Negative for pain, discharge, itching and visual disturbance  Respiratory: Negative for cough, chest tightness, shortness of breath and wheezing  Cardiovascular: Negative for chest pain, palpitations and leg swelling  Gastrointestinal: Negative for abdominal pain, blood in stool, constipation, diarrhea, nausea and vomiting  Genitourinary: Negative for frequency and urgency  Musculoskeletal: Negative  Skin: Negative  Neurological: Positive for dizziness  Negative for syncope, speech difficulty, weakness, light-headedness, numbness and headaches  Psychiatric/Behavioral: Negative            Objective:      /78   Pulse 83   Temp (!) 96 4 °F (35 8 °C)   Ht 5' 3" (1 6 m)   Wt 68 8 kg (151 lb 9 6 oz)   SpO2 98%   BMI 26 85 kg/m²          Physical Exam  Vitals signs and nursing note reviewed  Constitutional:       General: She is not in acute distress  Appearance: Normal appearance  HENT:      Head: Normocephalic and atraumatic  Right Ear: Tympanic membrane, ear canal and external ear normal       Left Ear: Tympanic membrane, ear canal and external ear normal       Nose: Nose normal       Mouth/Throat:      Mouth: Mucous membranes are moist       Pharynx: Oropharynx is clear  No oropharyngeal exudate or posterior oropharyngeal erythema  Eyes:      Pupils: Pupils are equal, round, and reactive to light  Neck:      Musculoskeletal: Normal range of motion and neck supple  Cardiovascular:      Rate and Rhythm: Normal rate and regular rhythm  Heart sounds: Normal heart sounds  Pulmonary:      Effort: Pulmonary effort is normal  No respiratory distress  Breath sounds: Normal breath sounds  Abdominal:      General: Bowel sounds are normal  There is no distension  Palpations: Abdomen is soft  There is no mass  Tenderness: There is no abdominal tenderness  There is no guarding  Musculoskeletal: Normal range of motion  Right lower leg: No edema  Left lower leg: No edema  Skin:     General: Skin is warm and dry  Coloration: Skin is not pale  Findings: No erythema or rash  Neurological:      Mental Status: She is alert and oriented to person, place, and time     Psychiatric:         Mood and Affect: Mood and affect normal

## 2021-02-12 DIAGNOSIS — E61.1 IRON DEFICIENCY: ICD-10-CM

## 2021-02-12 RX ORDER — FERROUS SULFATE TAB EC 324 MG (65 MG FE EQUIVALENT) 324 (65 FE) MG
324 TABLET DELAYED RESPONSE ORAL
Qty: 30 TABLET | Refills: 1 | Status: SHIPPED | OUTPATIENT
Start: 2021-02-12 | End: 2021-05-10

## 2021-02-16 DIAGNOSIS — M81.0 AGE-RELATED OSTEOPOROSIS WITHOUT CURRENT PATHOLOGICAL FRACTURE: ICD-10-CM

## 2021-02-16 DIAGNOSIS — Z12.83 SKIN CANCER SCREENING: ICD-10-CM

## 2021-02-16 DIAGNOSIS — B35.1 TOENAIL FUNGUS: ICD-10-CM

## 2021-02-16 DIAGNOSIS — Z00.00 MEDICARE ANNUAL WELLNESS VISIT, SUBSEQUENT: ICD-10-CM

## 2021-02-16 DIAGNOSIS — Z13.220 LIPID SCREENING: ICD-10-CM

## 2021-02-16 DIAGNOSIS — E11.9 TYPE 2 DIABETES MELLITUS WITHOUT COMPLICATION, WITHOUT LONG-TERM CURRENT USE OF INSULIN (HCC): ICD-10-CM

## 2021-02-16 DIAGNOSIS — M43.6 TORTICOLLIS: ICD-10-CM

## 2021-02-16 DIAGNOSIS — M1A.4710 OTHER SECONDARY CHRONIC GOUT OF RIGHT ANKLE WITHOUT TOPHUS: ICD-10-CM

## 2021-02-16 DIAGNOSIS — E78.2 HYPERLIPIDEMIA, MIXED: ICD-10-CM

## 2021-02-16 DIAGNOSIS — I10 ESSENTIAL HYPERTENSION: ICD-10-CM

## 2021-02-16 RX ORDER — GLIMEPIRIDE 4 MG/1
TABLET ORAL
Qty: 90 TABLET | Refills: 1 | Status: SHIPPED | OUTPATIENT
Start: 2021-02-16 | End: 2021-06-17

## 2021-02-16 RX ORDER — ACARBOSE 50 MG/1
TABLET ORAL
Qty: 90 TABLET | Refills: 1 | Status: SHIPPED | OUTPATIENT
Start: 2021-02-16 | End: 2021-06-17

## 2021-03-18 DIAGNOSIS — I10 ESSENTIAL HYPERTENSION: ICD-10-CM

## 2021-03-18 RX ORDER — BENAZEPRIL HYDROCHLORIDE AND HYDROCHLOROTHIAZIDE 20; 12.5 MG/1; MG/1
TABLET ORAL
Qty: 90 TABLET | Refills: 2 | Status: SHIPPED | OUTPATIENT
Start: 2021-03-18 | End: 2021-04-12 | Stop reason: ALTCHOICE

## 2021-04-05 ENCOUNTER — APPOINTMENT (OUTPATIENT)
Dept: LAB | Facility: CLINIC | Age: 81
End: 2021-04-05
Payer: COMMERCIAL

## 2021-04-05 DIAGNOSIS — E61.1 IRON DEFICIENCY: ICD-10-CM

## 2021-04-05 DIAGNOSIS — I10 ESSENTIAL HYPERTENSION: ICD-10-CM

## 2021-04-05 DIAGNOSIS — E78.2 HYPERLIPIDEMIA, MIXED: ICD-10-CM

## 2021-04-05 LAB
BASOPHILS # BLD AUTO: 0.04 THOUSANDS/ΜL (ref 0–0.1)
BASOPHILS NFR BLD AUTO: 1 % (ref 0–1)
CHOLEST SERPL-MCNC: 154 MG/DL (ref 50–200)
EOSINOPHIL # BLD AUTO: 0.21 THOUSAND/ΜL (ref 0–0.61)
EOSINOPHIL NFR BLD AUTO: 3 % (ref 0–6)
ERYTHROCYTE [DISTWIDTH] IN BLOOD BY AUTOMATED COUNT: 14.8 % (ref 11.6–15.1)
EST. AVERAGE GLUCOSE BLD GHB EST-MCNC: 123 MG/DL
FERRITIN SERPL-MCNC: 16 NG/ML (ref 8–388)
HBA1C MFR BLD: 5.9 %
HCT VFR BLD AUTO: 39.6 % (ref 34.8–46.1)
HDLC SERPL-MCNC: 55 MG/DL
HGB BLD-MCNC: 12.1 G/DL (ref 11.5–15.4)
IMM GRANULOCYTES # BLD AUTO: 0.02 THOUSAND/UL (ref 0–0.2)
IMM GRANULOCYTES NFR BLD AUTO: 0 % (ref 0–2)
IRON SATN MFR SERPL: 14 %
IRON SERPL-MCNC: 48 UG/DL (ref 50–170)
LDLC SERPL CALC-MCNC: 81 MG/DL (ref 0–100)
LYMPHOCYTES # BLD AUTO: 2.42 THOUSANDS/ΜL (ref 0.6–4.47)
LYMPHOCYTES NFR BLD AUTO: 37 % (ref 14–44)
MCH RBC QN AUTO: 28.9 PG (ref 26.8–34.3)
MCHC RBC AUTO-ENTMCNC: 30.6 G/DL (ref 31.4–37.4)
MCV RBC AUTO: 95 FL (ref 82–98)
MONOCYTES # BLD AUTO: 0.57 THOUSAND/ΜL (ref 0.17–1.22)
MONOCYTES NFR BLD AUTO: 9 % (ref 4–12)
NEUTROPHILS # BLD AUTO: 3.33 THOUSANDS/ΜL (ref 1.85–7.62)
NEUTS SEG NFR BLD AUTO: 50 % (ref 43–75)
NRBC BLD AUTO-RTO: 0 /100 WBCS
PLATELET # BLD AUTO: 273 THOUSANDS/UL (ref 149–390)
PMV BLD AUTO: 9.6 FL (ref 8.9–12.7)
RBC # BLD AUTO: 4.19 MILLION/UL (ref 3.81–5.12)
TIBC SERPL-MCNC: 333 UG/DL (ref 250–450)
TRIGL SERPL-MCNC: 91 MG/DL
WBC # BLD AUTO: 6.59 THOUSAND/UL (ref 4.31–10.16)

## 2021-04-05 PROCEDURE — 83540 ASSAY OF IRON: CPT

## 2021-04-05 PROCEDURE — 83550 IRON BINDING TEST: CPT

## 2021-04-05 PROCEDURE — 36415 COLL VENOUS BLD VENIPUNCTURE: CPT

## 2021-04-05 PROCEDURE — 83036 HEMOGLOBIN GLYCOSYLATED A1C: CPT

## 2021-04-05 PROCEDURE — 80061 LIPID PANEL: CPT

## 2021-04-05 PROCEDURE — 82728 ASSAY OF FERRITIN: CPT

## 2021-04-05 PROCEDURE — 85025 COMPLETE CBC W/AUTO DIFF WBC: CPT

## 2021-04-06 ENCOUNTER — RA CDI HCC (OUTPATIENT)
Dept: OTHER | Facility: HOSPITAL | Age: 81
End: 2021-04-06

## 2021-04-06 NOTE — PROGRESS NOTES
[facility]  coding oppertunities          Chart reviewed, no opportunity found: CHART REVIEWED, NO OPPORTUNITY FOUND minimum assist (75% patients effort)

## 2021-04-08 DIAGNOSIS — I10 ESSENTIAL HYPERTENSION: ICD-10-CM

## 2021-04-08 DIAGNOSIS — B35.1 TOENAIL FUNGUS: ICD-10-CM

## 2021-04-08 DIAGNOSIS — E11.9 TYPE 2 DIABETES MELLITUS WITHOUT COMPLICATION, WITHOUT LONG-TERM CURRENT USE OF INSULIN (HCC): ICD-10-CM

## 2021-04-08 DIAGNOSIS — M1A.4710 OTHER SECONDARY CHRONIC GOUT OF RIGHT ANKLE WITHOUT TOPHUS: ICD-10-CM

## 2021-04-08 DIAGNOSIS — Z13.220 LIPID SCREENING: ICD-10-CM

## 2021-04-08 DIAGNOSIS — Z00.00 MEDICARE ANNUAL WELLNESS VISIT, SUBSEQUENT: ICD-10-CM

## 2021-04-08 DIAGNOSIS — E78.2 HYPERLIPIDEMIA, MIXED: ICD-10-CM

## 2021-04-08 DIAGNOSIS — Z12.83 SKIN CANCER SCREENING: ICD-10-CM

## 2021-04-08 DIAGNOSIS — M43.6 TORTICOLLIS: ICD-10-CM

## 2021-04-08 DIAGNOSIS — M81.0 AGE-RELATED OSTEOPOROSIS WITHOUT CURRENT PATHOLOGICAL FRACTURE: ICD-10-CM

## 2021-04-12 ENCOUNTER — OFFICE VISIT (OUTPATIENT)
Dept: FAMILY MEDICINE CLINIC | Facility: CLINIC | Age: 81
End: 2021-04-12
Payer: COMMERCIAL

## 2021-04-12 VITALS
DIASTOLIC BLOOD PRESSURE: 110 MMHG | HEIGHT: 63 IN | SYSTOLIC BLOOD PRESSURE: 180 MMHG | OXYGEN SATURATION: 97 % | WEIGHT: 153.4 LBS | TEMPERATURE: 97.9 F | BODY MASS INDEX: 27.18 KG/M2 | HEART RATE: 110 BPM

## 2021-04-12 DIAGNOSIS — E61.1 IRON DEFICIENCY: ICD-10-CM

## 2021-04-12 DIAGNOSIS — E78.2 HYPERLIPIDEMIA, MIXED: ICD-10-CM

## 2021-04-12 DIAGNOSIS — E11.9 TYPE 2 DIABETES MELLITUS WITHOUT COMPLICATION, WITHOUT LONG-TERM CURRENT USE OF INSULIN (HCC): ICD-10-CM

## 2021-04-12 DIAGNOSIS — I10 ESSENTIAL HYPERTENSION: Primary | ICD-10-CM

## 2021-04-12 PROCEDURE — 99214 OFFICE O/P EST MOD 30 MIN: CPT | Performed by: PHYSICIAN ASSISTANT

## 2021-04-12 RX ORDER — LISINOPRIL 20 MG/1
20 TABLET ORAL DAILY
Qty: 30 TABLET | Refills: 0 | Status: SHIPPED | OUTPATIENT
Start: 2021-04-12 | End: 2021-05-10

## 2021-04-12 RX ORDER — ASPIRIN 81 MG/1
81 TABLET, CHEWABLE ORAL DAILY
COMMUNITY

## 2021-04-12 RX ORDER — AMLODIPINE BESYLATE 5 MG/1
5 TABLET ORAL DAILY
Qty: 30 TABLET | Refills: 0 | Status: SHIPPED | OUTPATIENT
Start: 2021-04-12 | End: 2021-04-23 | Stop reason: SDUPTHER

## 2021-04-12 NOTE — PROGRESS NOTES
Assessment/Plan:        Problem List Items Addressed This Visit        Endocrine    Type 2 diabetes mellitus without complication, without long-term current use of insulin (HCC)       Cardiovascular and Mediastinum    Hypertension - Primary    Relevant Medications    lisinopril (ZESTRIL) 20 mg tablet    amLODIPine (NORVASC) 5 mg tablet    Other Relevant Orders    Basic metabolic panel       Other    Hyperlipidemia, mixed    Iron deficiency        due to pt and family not wishing to take the previous medications, I discussed the need of starting new medications  I discussed they are safe with diabetes  Will start lisinopril 20mg and amlodipine 5mg  Repeat BMP in 1 week and follow up in the office  Check BP BID at home  Red flag signs and ED precautions discussed  May remain off [place] and [place]  Continue metformin and glimpse  Subjective:      Patient ID: Hector Monahan is a 68 y o  female  Pt presents for 3 month follow up and lab review     HTN: she stopped all of her BP medications, she was on nifedipine, HCTZ and benazepril  Her daughter shares their research found that the medications were not good to be taking with her diabetes  Her pressure today on recheck was 180/110  MA pressure was 143/92  She denies HA, CP, edema, vision change, syncope  DM: a1c increased from 5 7 to 5 9  she stopped her [place] and [place], sharing she felt shaky at times  She continues with metformin and glimepiride  HLD: on simvastatin, last LDL 81  CATY: iron remains slightly low, she is not anemic, she is taking an iron supplement    Noacute complaints, she feels well      The following portions of the patient's history were reviewed and updated as appropriate:   She  has a past medical history of Diabetes mellitus ([address] ), Hypertension, and Osteoporosis    She   Patient Active Problem List    Diagnosis Date Noted    Iron deficiency 04/12/2021    Need for pneumococcal vaccine 09/10/2020    Chronic neck pain 09/10/2020    Anemia 09/10/2020    Hypercalcemia 09/10/2020    Skin cancer screening 2020    Hyperlipidemia, mixed 2020    Age-related osteoporosis without current pathological fracture 2020    Gout 2020    Toenail fungus 2020    Torticollis 2020    Type 2 diabetes mellitus without complication, without long-term current use of insulin ([facility] [address] ) 2020    Hypertension 2020     She  has a past surgical history that includes  section  Her family history includes COPD in her mother; Diabetes in her mother; Substance Abuse in her mother  She  reports that she has never smoked  She has never used smokeless tobacco  She reports that she does not drink alcohol or use drugs    Current Outpatient Medications   Medication Sig Dispense Refill    aspirin 81 mg chewable tablet Chew 81 mg daily      acarbose (PRECOSE) 50 mg tablet TAKE 1 TABLET BY MOUTH DAILY WITH BREAKFAST 90 tablet 1    Alcohol Swabs (Pharmacist Choice Alcohol) PADS Use as directed      alendronate (FOSAMAX) 70 mg tablet TAKE 1 TABLET BY MOUTH EVERY 7 DAYS 12 tablet 1    amLODIPine (NORVASC) 5 mg tablet Take 1 tablet (5 mg total) by mouth daily 30 tablet 0    carvedilol (COREG) 6 25 mg tablet Take 1 tablet (6 25 mg total) by mouth 2 (two) times a day with meals (Patient not taking: Reported on 2021) 180 tablet 3    Comfort EZ Pen Needles 32G X 4 MM MISC BY DOES NOT APPLY ROUTE ONCE A WEEK 100 each 3    ferrous sulfate 324 (65 Fe) mg TAKE 1 TABLET (324 MG TOTAL) BY MOUTH DAILY BEFORE BREAKFAST 30 tablet 1    fluorouracil (EFUDEX) 5 % cream Apply topically 2 (two) times a day For 2 weeks on scalp 40 g 0    glimepiride (AMARYL) 4 mg tablet TAKE 1 TABLET BY MOUTH DAILY WITH BREAKFAST 90 tablet 1    glucose blood (OneTouch Verio) test strip Test sugar daily 100 each 3    Lancet Devices (Adjustable Lancing Device) MISC MEDICARE B      [employer] Software[employer] FINEPOINT LANCETS MISC by Does not apply route daily 100 each 3    lisinopril (ZESTRIL) 20 mg tablet Take 1 tablet (20 mg total) by mouth daily 30 tablet 0    meloxicam (MOBIC) 15 mg tablet Take 1 tablet (15 mg total) by mouth daily (Patient not taking: Reported on 4/12/2021) 30 tablet 0    metFORMIN (GLUCOPHAGE) 850 mg tablet TAKE 1 TABLET BY MOUTH 2 TIMES A DAY WITH MEALS 180 tablet 1    Multiple Vitamins-Minerals (PreserVision AREDS 2) CAPS TAKE 2 CAPSULES BY MOUTH DAILY IN THE MORNING  PATIENT BRINGS STOCK FROM HOME      simvastatin (ZOCOR) 10 mg tablet Take 1 tablet (10 mg total) by mouth daily at bedtime (Patient not taking: Reported on 4/12/2021) 90 tablet 1    vitamin B-12 (VITAMIN B-12) 1,000 mcg tablet TAKE ONE TABLET BY MOUTH DAILY IN THE MORNING  PATIENT BRINGS STOCK FROM HOME       No current facility-administered medications for this visit        Current Outpatient Medications on File Prior to Visit   Medication Sig    aspirin 81 mg chewable tablet Chew 81 mg daily    acarbose (PRECOSE) 50 mg tablet TAKE 1 TABLET BY MOUTH DAILY WITH BREAKFAST    Alcohol Swabs (Pharmacist Choice Alcohol) PADS Use as directed    alendronate (FOSAMAX) 70 mg tablet TAKE 1 TABLET BY MOUTH EVERY 7 DAYS    carvedilol (COREG) 6 25 mg tablet Take 1 tablet (6 25 mg total) by mouth 2 (two) times a day with meals (Patient not taking: Reported on 4/12/2021)    Comfort EZ Pen Needles 32G X 4 MM MISC BY DOES NOT APPLY ROUTE ONCE A WEEK    ferrous sulfate 324 (65 Fe) mg TAKE 1 TABLET (324 MG TOTAL) BY MOUTH DAILY BEFORE BREAKFAST    fluorouracil (EFUDEX) 5 % cream Apply topically 2 (two) times a day For 2 weeks on scalp    glimepiride (AMARYL) 4 mg tablet TAKE 1 TABLET BY MOUTH DAILY WITH BREAKFAST    glucose blood (OneTouch Verio) test strip Test sugar daily    Lancet Devices (Adjustable Lancing Device) MISC MEDICARE B    [employer] FINEPOINT LANCETS MISC by Does not apply route daily    meloxicam (MOBIC) 15 mg tablet Take 1 tablet (15 mg total) by mouth daily (Patient not taking: Reported on 4/12/2021)    metFORMIN (GLUCOPHAGE) 850 mg tablet TAKE 1 TABLET BY MOUTH 2 TIMES A DAY WITH MEALS    Multiple Vitamins-Minerals (PreserVision AREDS 2) CAPS TAKE 2 CAPSULES BY MOUTH DAILY IN THE MORNING  PATIENT BRINGS STOCK FROM HOME    simvastatin (ZOCOR) 10 mg tablet Take 1 tablet (10 mg total) by mouth daily at bedtime (Patient not taking: Reported on 4/12/2021)    vitamin B-12 (VITAMIN B-12) 1,000 mcg tablet TAKE ONE TABLET BY MOUTH DAILY IN THE MORNING  PATIENT BRINGS STOCK FROM HOME    [DISCONTINUED] benazepril-hydrochlorthiazide (LOTENSIN HCT) 20-12 5 MG per tablet TAKE ONE TABLET BY MOUTH DAILY (Patient not taking: Reported on 4/12/2021)    [DISCONTINUED] Januvia 100 MG tablet TAKE ONE TABLET BY MOUTH DAILY (Patient not taking: Reported on 4/12/2021)    [DISCONTINUED] NIFEdipine ER (ADALAT CC) 30 MG 24 hr tablet TAKE ONE TABLET BY MOUTH DAILY (Patient not taking: Reported on 4/12/2021)     No current facility-administered medications on file prior to visit  She has No Known Allergies       Review of Systems   Constitutional: Negative for chills, fatigue and fever  HENT: Negative for congestion, ear pain, hearing loss, nosebleeds, postnasal drip, rhinorrhea, sinus pressure, sinus pain, sneezing and sore throat  Eyes: Negative for pain, discharge, itching and visual disturbance  Respiratory: Negative for cough, chest tightness, shortness of breath and wheezing  Cardiovascular: Negative for chest pain, palpitations and leg swelling  Gastrointestinal: Negative for abdominal pain, blood in stool, constipation, diarrhea, nausea and vomiting  Genitourinary: Negative for frequency and urgency  Musculoskeletal: Negative  Skin: Negative  Neurological: Negative for dizziness, syncope, weakness, light-headedness, numbness and headaches           Objective:      BP (!) 180/110   Pulse (!) 110   Temp 97 9 °F (36 6 °C)   Ht 5' 3" (1 6 m)   Wt 69 6 kg (153 lb 6 4 oz)   SpO2 97%   BMI 27 17 kg/m²          Physical Exam  Vitals signs and nursing note reviewed  Constitutional:       General: She is not in acute distress  Appearance: Normal appearance  HENT:      Head: Normocephalic and atraumatic  Nose: Nose normal    Eyes:      Pupils: Pupils are equal, round, and reactive to light  Neck:      Musculoskeletal: Normal range of motion and neck supple  Cardiovascular:      Rate and Rhythm: Normal rate and regular rhythm  Heart sounds: Normal heart sounds  No murmur  Pulmonary:      Effort: Pulmonary effort is normal  No respiratory distress  Breath sounds: Normal breath sounds  No wheezing, rhonchi or rales  Musculoskeletal: Normal range of motion  General: No tenderness  Right lower leg: No edema  Left lower leg: No edema  Skin:     General: Skin is warm and dry  Coloration: Skin is not pale  Findings: No erythema or rash  Neurological:      Mental Status: She is alert and oriented to person, place, and time     Psychiatric:         Mood and Affect: Mood and affect normal

## 2021-04-13 DIAGNOSIS — E78.2 HYPERLIPIDEMIA, MIXED: ICD-10-CM

## 2021-04-13 RX ORDER — SIMVASTATIN 10 MG
TABLET ORAL
Qty: 90 TABLET | Refills: 1 | Status: SHIPPED | OUTPATIENT
Start: 2021-04-13 | End: 2021-07-16

## 2021-04-19 ENCOUNTER — APPOINTMENT (OUTPATIENT)
Dept: LAB | Facility: CLINIC | Age: 81
End: 2021-04-19
Payer: COMMERCIAL

## 2021-04-19 ENCOUNTER — RA CDI HCC (OUTPATIENT)
Dept: OTHER | Facility: HOSPITAL | Age: 81
End: 2021-04-19

## 2021-04-19 DIAGNOSIS — I10 ESSENTIAL HYPERTENSION: ICD-10-CM

## 2021-04-19 LAB
ANION GAP SERPL CALCULATED.3IONS-SCNC: 4 MMOL/L (ref 4–13)
BUN SERPL-MCNC: 18 MG/DL (ref 5–25)
CALCIUM SERPL-MCNC: 9.5 MG/DL (ref 8.3–10.1)
CHLORIDE SERPL-SCNC: 109 MMOL/L (ref 100–108)
CO2 SERPL-SCNC: 28 MMOL/L (ref 21–32)
CREAT SERPL-MCNC: 0.79 MG/DL (ref 0.6–1.3)
GFR SERPL CREATININE-BSD FRML MDRD: 72 ML/MIN/1.73SQ M
GLUCOSE P FAST SERPL-MCNC: 110 MG/DL (ref 65–99)
POTASSIUM SERPL-SCNC: 4.1 MMOL/L (ref 3.5–5.3)
SODIUM SERPL-SCNC: 141 MMOL/L (ref 136–145)

## 2021-04-19 PROCEDURE — 80048 BASIC METABOLIC PNL TOTAL CA: CPT

## 2021-04-19 PROCEDURE — 36415 COLL VENOUS BLD VENIPUNCTURE: CPT

## 2021-04-19 NOTE — PROGRESS NOTES
[facility]  coding opportunities          Chart reviewed, no opportunity found: CHART REVIEWED, NO OPPORTUNITY FOUND              Patients insurance company: [facility] [address]  (Medicare Advantage and Commercial)

## 2021-04-23 ENCOUNTER — OFFICE VISIT (OUTPATIENT)
Dept: FAMILY MEDICINE CLINIC | Facility: CLINIC | Age: 81
End: 2021-04-23
Payer: COMMERCIAL

## 2021-04-23 VITALS
HEIGHT: 63 IN | OXYGEN SATURATION: 97 % | WEIGHT: 153 LBS | TEMPERATURE: 98.7 F | BODY MASS INDEX: 27.11 KG/M2 | DIASTOLIC BLOOD PRESSURE: 80 MMHG | HEART RATE: 90 BPM | SYSTOLIC BLOOD PRESSURE: 142 MMHG

## 2021-04-23 DIAGNOSIS — E11.9 TYPE 2 DIABETES MELLITUS WITHOUT COMPLICATION, WITHOUT LONG-TERM CURRENT USE OF INSULIN (HCC): ICD-10-CM

## 2021-04-23 DIAGNOSIS — E78.2 HYPERLIPIDEMIA, MIXED: ICD-10-CM

## 2021-04-23 DIAGNOSIS — I10 ESSENTIAL HYPERTENSION: Primary | ICD-10-CM

## 2021-04-23 PROCEDURE — 99214 OFFICE O/P EST MOD 30 MIN: CPT | Performed by: PHYSICIAN ASSISTANT

## 2021-04-23 PROCEDURE — 3077F SYST BP >= 140 MM HG: CPT | Performed by: PHYSICIAN ASSISTANT

## 2021-04-23 PROCEDURE — 1160F RVW MEDS BY RX/DR IN RCRD: CPT | Performed by: PHYSICIAN ASSISTANT

## 2021-04-23 PROCEDURE — 1036F TOBACCO NON-USER: CPT | Performed by: PHYSICIAN ASSISTANT

## 2021-04-23 PROCEDURE — 3079F DIAST BP 80-89 MM HG: CPT | Performed by: PHYSICIAN ASSISTANT

## 2021-04-23 RX ORDER — AMLODIPINE BESYLATE 10 MG/1
10 TABLET ORAL DAILY
Qty: 90 TABLET | Refills: 0 | Status: SHIPPED | OUTPATIENT
Start: 2021-04-23 | End: 2021-10-18 | Stop reason: ALTCHOICE

## 2021-04-23 NOTE — PROGRESS NOTES
Assessment/Plan:       Problem List Items Addressed This Visit        Endocrine    Type 2 diabetes mellitus without complication, without long-term current use of insulin ([facility])    Relevant Orders    Comprehensive metabolic panel    HEMOGLOBIN A1C W/ EAG ESTIMATION       Cardiovascular and Mediastinum    Hypertension - Primary    Relevant Medications    amLODIPine (NORVASC) 10 mg tablet       Other    Hyperlipidemia, mixed    Relevant Orders    Lipid Panel with Direct LDL reflex        BP not quite at goal, increase amlodipine to 10mg daily, continue lisniopril 20mg  Monitor BP at home  Follow up in 3 months, repeat labs prior to return     Subjective:      Patient ID: Sebastián Bowers is a 68 y o  female  Pt presents for 1 week follow up  Was started on amlodipine and lisinopril 1 week ago after stopping all of her BP medication due to worsening blood sugar  She shares at home her systolic pressure have been in the 130s  Today her BP on recheck is 142/80  She has no CP, SOB, LE edema, palpitations, HA  She feels well  She also shares her blood sugar has been steady around 110-120  The following portions of the patient's history were reviewed and updated as appropriate:   She  has a past medical history of Diabetes mellitus ([facility] [address] ), Hypertension, and Osteoporosis  She   Patient Active Problem List    Diagnosis Date Noted    Iron deficiency 2021    Need for pneumococcal vaccine 09/10/2020    Chronic neck pain 09/10/2020    Anemia 09/10/2020    Hypercalcemia 09/10/2020    Skin cancer screening 2020    Hyperlipidemia, mixed 2020    Age-related osteoporosis without current pathological fracture 2020    Gout 2020    Toenail fungus 2020    Torticollis 2020    Type 2 diabetes mellitus without complication, without long-term current use of insulin ([facility] [address] ) 2020    Hypertension 2020     She  has a past surgical history that includes  section    Her family history includes COPD in her mother; Diabetes in her mother; Substance Abuse in her mother  She  reports that she has never smoked  She has never used smokeless tobacco  She reports that she does not drink alcohol or use drugs  Current Outpatient Medications   Medication Sig Dispense Refill    acarbose (PRECOSE) 50 mg tablet TAKE 1 TABLET BY MOUTH DAILY WITH BREAKFAST 90 tablet 1    Alcohol Swabs (Pharmacist Choice Alcohol) PADS Use as directed      alendronate (FOSAMAX) 70 mg tablet TAKE 1 TABLET BY MOUTH EVERY 7 DAYS 12 tablet 1    amLODIPine (NORVASC) 10 mg tablet Take 1 tablet (10 mg total) by mouth daily 90 tablet 0    aspirin 81 mg chewable tablet Chew 81 mg daily      Comfort EZ Pen Needles 32G X 4 MM MIS BY DOES NOT APPLY ROUTE ONCE A WEEK 100 each 3    fluorouracil (EFUDEX) 5 % cream Apply topically 2 (two) times a day For 2 weeks on scalp 40 g 0    glimepiride (AMARYL) 4 mg tablet TAKE 1 TABLET BY MOUTH DAILY WITH BREAKFAST 90 tablet 1    glucose blood (OneTouch Verio) test strip Test sugar daily 100 each 3    Lancet Devices (Adjustable Lancing Device) MISC MEDICARE B      ViewdleCAN FINEPOINT LANCETS MISC by Does not apply route daily 100 each 3    lisinopril (ZESTRIL) 20 mg tablet Take 1 tablet (20 mg total) by mouth daily 30 tablet 0    metFORMIN (GLUCOPHAGE) 850 mg tablet TAKE 1 TABLET BY MOUTH 2 TIMES A DAY WITH MEALS 180 tablet 1    Multiple Vitamins-Minerals (PreserVision AREDS 2) CAPS TAKE 2 CAPSULES BY MOUTH DAILY IN THE MORNING    PATIENT BRINGS STOCK FROM HOME      vitamin B-12 (VITAMIN B-12) 1,000 mcg tablet TAKE ONE TABLET BY MOUTH DAILY IN THE MORNING  PATIENT BRINGS STOCK FROM HOME      carvedilol (COREG) 6 25 mg tablet Take 1 tablet (6 25 mg total) by mouth 2 (two) times a day with meals (Patient not taking: Reported on 4/12/2021) 180 tablet 3    ferrous sulfate 324 (65 Fe) mg TAKE 1 TABLET (324 MG TOTAL) BY MOUTH DAILY BEFORE BREAKFAST 30 tablet 1    meloxicam (MOBIC) 15 mg tablet Take 1 tablet (15 mg total) by mouth daily (Patient not taking: Reported on 4/12/2021) 30 tablet 0    simvastatin (ZOCOR) 10 mg tablet TAKE ONE TABLET BY MOUTH DAILY AT BEDTIME (Patient not taking: Reported on 4/23/2021) 90 tablet 1     No current facility-administered medications for this visit  Current Outpatient Medications on File Prior to Visit   Medication Sig    acarbose (PRECOSE) 50 mg tablet TAKE 1 TABLET BY MOUTH DAILY WITH BREAKFAST    Alcohol Swabs (Pharmacist Choice Alcohol) PADS Use as directed    alendronate (FOSAMAX) 70 mg tablet TAKE 1 TABLET BY MOUTH EVERY 7 DAYS    aspirin 81 mg chewable tablet Chew 81 mg daily    Comfort EZ Pen Needles 32G X 4 MM MISC BY DOES NOT APPLY ROUTE ONCE A WEEK    fluorouracil (EFUDEX) 5 % cream Apply topically 2 (two) times a day For 2 weeks on scalp    glimepiride (AMARYL) 4 mg tablet TAKE 1 TABLET BY MOUTH DAILY WITH BREAKFAST    glucose blood (OneTouch Verio) test strip Test sugar daily    Lancet Devices (Adjustable Lancing Device) MIS MEDICARE B    [place] OTRCAN FINEPOINT LANCETS MISC by Does not apply route daily    lisinopril (ZESTRIL) 20 mg tablet Take 1 tablet (20 mg total) by mouth daily    metFORMIN (GLUCOPHAGE) 850 mg tablet TAKE 1 TABLET BY MOUTH 2 TIMES A DAY WITH MEALS    Multiple Vitamins-Minerals (PreserVision AREDS 2) CAPS TAKE 2 CAPSULES BY MOUTH DAILY IN THE MORNING    PATIENT BRINGS STOCK FROM HOME    vitamin B-12 (VITAMIN B-12) 1,000 mcg tablet TAKE ONE TABLET BY MOUTH DAILY IN THE MORNING  PATIENT BRINGS STOCK FROM HOME    [DISCONTINUED] amLODIPine (NORVASC) 5 mg tablet Take 1 tablet (5 mg total) by mouth daily    carvedilol (COREG) 6 25 mg tablet Take 1 tablet (6 25 mg total) by mouth 2 (two) times a day with meals (Patient not taking: Reported on 4/12/2021)    ferrous sulfate 324 (65 Fe) mg TAKE 1 TABLET (324 MG TOTAL) BY MOUTH DAILY BEFORE BREAKFAST    meloxicam (MOBIC) 15 mg tablet Take 1 tablet (15 mg total) by mouth daily (Patient not taking: Reported on 4/12/2021)    simvastatin (ZOCOR) 10 mg tablet TAKE ONE TABLET BY MOUTH DAILY AT BEDTIME (Patient not taking: Reported on 4/23/2021)     No current facility-administered medications on file prior to visit  She has No Known Allergies       Review of Systems   Constitutional: Negative for chills, fatigue and fever  HENT: Negative for congestion, ear pain, hearing loss, nosebleeds, postnasal drip, rhinorrhea, sinus pressure, sinus pain, sneezing and sore throat  Eyes: Negative for pain, discharge, itching and visual disturbance  Respiratory: Negative for cough, chest tightness, shortness of breath and wheezing  Cardiovascular: Negative for chest pain, palpitations and leg swelling  Gastrointestinal: Negative for abdominal pain, blood in stool, constipation, diarrhea, nausea and vomiting  Genitourinary: Negative for frequency and urgency  Musculoskeletal: Negative  Skin: Negative  Neurological: Negative for dizziness, light-headedness and numbness  Objective:      /80   Pulse 90   Temp 98 7 °F (37 1 °C) (Tympanic)   Ht 5' 3" (1 6 m)   Wt 69 4 kg (153 lb)   SpO2 97%   BMI 27 10 kg/m²          Physical Exam  Vitals signs and nursing note reviewed  Constitutional:       General: She is not in acute distress  Appearance: Normal appearance  HENT:      Head: Normocephalic and atraumatic  Nose: Nose normal       Mouth/Throat:      Mouth: Mucous membranes are moist       Pharynx: Oropharynx is clear  No oropharyngeal exudate or posterior oropharyngeal erythema  Eyes:      Pupils: Pupils are equal, round, and reactive to light  Neck:      Musculoskeletal: Normal range of motion and neck supple  Cardiovascular:      Rate and Rhythm: Normal rate and regular rhythm  Heart sounds: Normal heart sounds  No murmur  Pulmonary:      Effort: Pulmonary effort is normal  No respiratory distress        Breath sounds: Normal breath sounds  No wheezing, rhonchi or rales  Musculoskeletal: Normal range of motion  Right lower leg: No edema  Left lower leg: No edema  Skin:     General: Skin is warm and dry  Neurological:      Mental Status: She is alert and oriented to person, place, and time     Psychiatric:         Mood and Affect: Mood and affect normal

## 2021-05-08 DIAGNOSIS — I10 ESSENTIAL HYPERTENSION: ICD-10-CM

## 2021-05-08 DIAGNOSIS — E61.1 IRON DEFICIENCY: ICD-10-CM

## 2021-05-10 RX ORDER — LISINOPRIL 20 MG/1
20 TABLET ORAL DAILY
Qty: 30 TABLET | Refills: 0 | Status: SHIPPED | OUTPATIENT
Start: 2021-05-10 | End: 2021-06-04

## 2021-05-10 RX ORDER — FERROUS SULFATE TAB EC 324 MG (65 MG FE EQUIVALENT) 324 (65 FE) MG
324 TABLET DELAYED RESPONSE ORAL
Qty: 30 TABLET | Refills: 1 | Status: SHIPPED | OUTPATIENT
Start: 2021-05-10 | End: 2021-08-30

## 2021-05-19 DIAGNOSIS — E11.9 TYPE 2 DIABETES MELLITUS WITHOUT COMPLICATION, WITHOUT LONG-TERM CURRENT USE OF INSULIN (HCC): ICD-10-CM

## 2021-05-19 RX ORDER — BLOOD SUGAR DIAGNOSTIC
STRIP MISCELLANEOUS
Qty: 100 EACH | Refills: 3 | Status: SHIPPED | OUTPATIENT
Start: 2021-05-19 | End: 2021-08-30

## 2021-05-26 DIAGNOSIS — I10 ESSENTIAL HYPERTENSION: ICD-10-CM

## 2021-05-26 RX ORDER — AMLODIPINE BESYLATE 5 MG/1
TABLET ORAL
Qty: 30 TABLET | Refills: 0 | Status: SHIPPED | OUTPATIENT
Start: 2021-05-26 | End: 2021-06-30

## 2021-06-04 DIAGNOSIS — I10 ESSENTIAL HYPERTENSION: ICD-10-CM

## 2021-06-04 RX ORDER — LISINOPRIL 20 MG/1
20 TABLET ORAL DAILY
Qty: 30 TABLET | Refills: 0 | Status: SHIPPED | OUTPATIENT
Start: 2021-06-04 | End: 2021-06-30

## 2021-06-14 DIAGNOSIS — E11.65 UNCONTROLLED TYPE 2 DIABETES MELLITUS WITH HYPERGLYCEMIA (HCC): ICD-10-CM

## 2021-06-15 RX ORDER — DULAGLUTIDE 0.75 MG/.5ML
INJECTION, SOLUTION SUBCUTANEOUS
Qty: 2 ML | Refills: 2 | OUTPATIENT
Start: 2021-06-15

## 2021-06-17 DIAGNOSIS — M81.0 AGE-RELATED OSTEOPOROSIS WITHOUT CURRENT PATHOLOGICAL FRACTURE: ICD-10-CM

## 2021-06-17 DIAGNOSIS — E78.2 HYPERLIPIDEMIA, MIXED: ICD-10-CM

## 2021-06-17 DIAGNOSIS — M43.6 TORTICOLLIS: ICD-10-CM

## 2021-06-17 DIAGNOSIS — Z13.220 LIPID SCREENING: ICD-10-CM

## 2021-06-17 DIAGNOSIS — I10 ESSENTIAL HYPERTENSION: ICD-10-CM

## 2021-06-17 DIAGNOSIS — E11.9 TYPE 2 DIABETES MELLITUS WITHOUT COMPLICATION, WITHOUT LONG-TERM CURRENT USE OF INSULIN (HCC): ICD-10-CM

## 2021-06-17 DIAGNOSIS — Z12.83 SKIN CANCER SCREENING: ICD-10-CM

## 2021-06-17 DIAGNOSIS — M1A.4710 OTHER SECONDARY CHRONIC GOUT OF RIGHT ANKLE WITHOUT TOPHUS: ICD-10-CM

## 2021-06-17 DIAGNOSIS — Z00.00 MEDICARE ANNUAL WELLNESS VISIT, SUBSEQUENT: ICD-10-CM

## 2021-06-17 DIAGNOSIS — B35.1 TOENAIL FUNGUS: ICD-10-CM

## 2021-06-17 RX ORDER — ACARBOSE 50 MG/1
TABLET ORAL
Qty: 90 TABLET | Refills: 1 | Status: SHIPPED | OUTPATIENT
Start: 2021-06-17 | End: 2021-12-07

## 2021-06-17 RX ORDER — MELOXICAM 15 MG/1
15 TABLET ORAL DAILY
Qty: 30 TABLET | Refills: 1 | OUTPATIENT
Start: 2021-06-17

## 2021-06-17 RX ORDER — GLIMEPIRIDE 4 MG/1
TABLET ORAL
Qty: 90 TABLET | Refills: 1 | Status: SHIPPED | OUTPATIENT
Start: 2021-06-17 | End: 2021-07-16

## 2021-06-28 ENCOUNTER — APPOINTMENT (OUTPATIENT)
Dept: LAB | Facility: CLINIC | Age: 81
End: 2021-06-28
Payer: COMMERCIAL

## 2021-06-28 DIAGNOSIS — E78.2 HYPERLIPIDEMIA, MIXED: ICD-10-CM

## 2021-06-28 DIAGNOSIS — E11.9 TYPE 2 DIABETES MELLITUS WITHOUT COMPLICATION, WITHOUT LONG-TERM CURRENT USE OF INSULIN (HCC): ICD-10-CM

## 2021-06-28 LAB
ALBUMIN SERPL BCP-MCNC: 3.4 G/DL (ref 3.5–5)
ALP SERPL-CCNC: 53 U/L (ref 46–116)
ALT SERPL W P-5'-P-CCNC: 19 U/L (ref 12–78)
ANION GAP SERPL CALCULATED.3IONS-SCNC: 4 MMOL/L (ref 4–13)
AST SERPL W P-5'-P-CCNC: 11 U/L (ref 5–45)
BILIRUB SERPL-MCNC: 0.28 MG/DL (ref 0.2–1)
BUN SERPL-MCNC: 18 MG/DL (ref 5–25)
CALCIUM ALBUM COR SERPL-MCNC: 10.2 MG/DL (ref 8.3–10.1)
CALCIUM SERPL-MCNC: 9.7 MG/DL (ref 8.3–10.1)
CHLORIDE SERPL-SCNC: 106 MMOL/L (ref 100–108)
CHOLEST SERPL-MCNC: 155 MG/DL (ref 50–200)
CO2 SERPL-SCNC: 31 MMOL/L (ref 21–32)
CREAT SERPL-MCNC: 0.88 MG/DL (ref 0.6–1.3)
EST. AVERAGE GLUCOSE BLD GHB EST-MCNC: 128 MG/DL
GFR SERPL CREATININE-BSD FRML MDRD: 63 ML/MIN/1.73SQ M
GLUCOSE P FAST SERPL-MCNC: 110 MG/DL (ref 65–99)
HBA1C MFR BLD: 6.1 %
HDLC SERPL-MCNC: 52 MG/DL
LDLC SERPL CALC-MCNC: 87 MG/DL (ref 0–100)
POTASSIUM SERPL-SCNC: 3.9 MMOL/L (ref 3.5–5.3)
PROT SERPL-MCNC: 7.3 G/DL (ref 6.4–8.2)
SODIUM SERPL-SCNC: 141 MMOL/L (ref 136–145)
TRIGL SERPL-MCNC: 80 MG/DL

## 2021-06-28 PROCEDURE — 36415 COLL VENOUS BLD VENIPUNCTURE: CPT

## 2021-06-28 PROCEDURE — 83036 HEMOGLOBIN GLYCOSYLATED A1C: CPT

## 2021-06-28 PROCEDURE — 80053 COMPREHEN METABOLIC PANEL: CPT

## 2021-06-28 PROCEDURE — 80061 LIPID PANEL: CPT

## 2021-07-01 ENCOUNTER — TELEPHONE (OUTPATIENT)
Dept: DERMATOLOGY | Facility: CLINIC | Age: 81
End: 2021-07-01

## 2021-07-16 ENCOUNTER — APPOINTMENT (OUTPATIENT)
Dept: LAB | Facility: CLINIC | Age: 81
End: 2021-07-16
Payer: COMMERCIAL

## 2021-07-16 ENCOUNTER — OFFICE VISIT (OUTPATIENT)
Dept: FAMILY MEDICINE CLINIC | Facility: CLINIC | Age: 81
End: 2021-07-16
Payer: COMMERCIAL

## 2021-07-16 VITALS
BODY MASS INDEX: 27.32 KG/M2 | TEMPERATURE: 97 F | WEIGHT: 154.2 LBS | SYSTOLIC BLOOD PRESSURE: 162 MMHG | HEIGHT: 63 IN | HEART RATE: 76 BPM | OXYGEN SATURATION: 97 % | DIASTOLIC BLOOD PRESSURE: 88 MMHG

## 2021-07-16 DIAGNOSIS — E61.1 IRON DEFICIENCY: ICD-10-CM

## 2021-07-16 DIAGNOSIS — R53.83 OTHER FATIGUE: ICD-10-CM

## 2021-07-16 DIAGNOSIS — E53.8 VITAMIN B12 DEFICIENCY: ICD-10-CM

## 2021-07-16 DIAGNOSIS — I10 ESSENTIAL HYPERTENSION: ICD-10-CM

## 2021-07-16 DIAGNOSIS — E78.2 HYPERLIPIDEMIA, MIXED: ICD-10-CM

## 2021-07-16 DIAGNOSIS — Z00.00 MEDICARE ANNUAL WELLNESS VISIT, SUBSEQUENT: ICD-10-CM

## 2021-07-16 DIAGNOSIS — M81.0 AGE-RELATED OSTEOPOROSIS WITHOUT CURRENT PATHOLOGICAL FRACTURE: ICD-10-CM

## 2021-07-16 DIAGNOSIS — E11.9 TYPE 2 DIABETES MELLITUS WITHOUT COMPLICATION, WITHOUT LONG-TERM CURRENT USE OF INSULIN (HCC): Primary | ICD-10-CM

## 2021-07-16 PROBLEM — D64.9 ANEMIA: Status: RESOLVED | Noted: 2020-09-10 | Resolved: 2021-07-16

## 2021-07-16 PROBLEM — Z23 NEED FOR PNEUMOCOCCAL VACCINE: Status: RESOLVED | Noted: 2020-09-10 | Resolved: 2021-07-16

## 2021-07-16 PROBLEM — Z12.83 SKIN CANCER SCREENING: Status: RESOLVED | Noted: 2020-06-08 | Resolved: 2021-07-16

## 2021-07-16 LAB
BASOPHILS # BLD AUTO: 0.05 THOUSANDS/ΜL (ref 0–0.1)
BASOPHILS NFR BLD AUTO: 1 % (ref 0–1)
EOSINOPHIL # BLD AUTO: 0.15 THOUSAND/ΜL (ref 0–0.61)
EOSINOPHIL NFR BLD AUTO: 2 % (ref 0–6)
ERYTHROCYTE [DISTWIDTH] IN BLOOD BY AUTOMATED COUNT: 14.7 % (ref 11.6–15.1)
FERRITIN SERPL-MCNC: 19 NG/ML (ref 8–388)
HCT VFR BLD AUTO: 38.4 % (ref 34.8–46.1)
HGB BLD-MCNC: 11.6 G/DL (ref 11.5–15.4)
IMM GRANULOCYTES # BLD AUTO: 0.03 THOUSAND/UL (ref 0–0.2)
IMM GRANULOCYTES NFR BLD AUTO: 0 % (ref 0–2)
IRON SATN MFR SERPL: 18 %
IRON SERPL-MCNC: 56 UG/DL (ref 50–170)
LYMPHOCYTES # BLD AUTO: 2.28 THOUSANDS/ΜL (ref 0.6–4.47)
LYMPHOCYTES NFR BLD AUTO: 34 % (ref 14–44)
MCH RBC QN AUTO: 28.9 PG (ref 26.8–34.3)
MCHC RBC AUTO-ENTMCNC: 30.2 G/DL (ref 31.4–37.4)
MCV RBC AUTO: 96 FL (ref 82–98)
MONOCYTES # BLD AUTO: 0.5 THOUSAND/ΜL (ref 0.17–1.22)
MONOCYTES NFR BLD AUTO: 7 % (ref 4–12)
NEUTROPHILS # BLD AUTO: 3.76 THOUSANDS/ΜL (ref 1.85–7.62)
NEUTS SEG NFR BLD AUTO: 56 % (ref 43–75)
NRBC BLD AUTO-RTO: 0 /100 WBCS
PLATELET # BLD AUTO: 257 THOUSANDS/UL (ref 149–390)
PMV BLD AUTO: 10.1 FL (ref 8.9–12.7)
RBC # BLD AUTO: 4.02 MILLION/UL (ref 3.81–5.12)
TIBC SERPL-MCNC: 309 UG/DL (ref 250–450)
TSH SERPL DL<=0.05 MIU/L-ACNC: 1.01 UIU/ML (ref 0.36–3.74)
VIT B12 SERPL-MCNC: 400 PG/ML (ref 100–900)
WBC # BLD AUTO: 6.77 THOUSAND/UL (ref 4.31–10.16)

## 2021-07-16 PROCEDURE — 1125F AMNT PAIN NOTED PAIN PRSNT: CPT | Performed by: FAMILY MEDICINE

## 2021-07-16 PROCEDURE — 1160F RVW MEDS BY RX/DR IN RCRD: CPT | Performed by: FAMILY MEDICINE

## 2021-07-16 PROCEDURE — 82607 VITAMIN B-12: CPT

## 2021-07-16 PROCEDURE — 36415 COLL VENOUS BLD VENIPUNCTURE: CPT

## 2021-07-16 PROCEDURE — 83540 ASSAY OF IRON: CPT

## 2021-07-16 PROCEDURE — 3077F SYST BP >= 140 MM HG: CPT | Performed by: FAMILY MEDICINE

## 2021-07-16 PROCEDURE — 84443 ASSAY THYROID STIM HORMONE: CPT

## 2021-07-16 PROCEDURE — 92250 FUNDUS PHOTOGRAPHY W/I&R: CPT | Performed by: FAMILY MEDICINE

## 2021-07-16 PROCEDURE — 3288F FALL RISK ASSESSMENT DOCD: CPT | Performed by: FAMILY MEDICINE

## 2021-07-16 PROCEDURE — 1170F FXNL STATUS ASSESSED: CPT | Performed by: FAMILY MEDICINE

## 2021-07-16 PROCEDURE — 1036F TOBACCO NON-USER: CPT | Performed by: FAMILY MEDICINE

## 2021-07-16 PROCEDURE — G0439 PPPS, SUBSEQ VISIT: HCPCS | Performed by: FAMILY MEDICINE

## 2021-07-16 PROCEDURE — 3725F SCREEN DEPRESSION PERFORMED: CPT | Performed by: FAMILY MEDICINE

## 2021-07-16 PROCEDURE — 82728 ASSAY OF FERRITIN: CPT

## 2021-07-16 PROCEDURE — 83550 IRON BINDING TEST: CPT

## 2021-07-16 PROCEDURE — 85025 COMPLETE CBC W/AUTO DIFF WBC: CPT

## 2021-07-16 PROCEDURE — 3079F DIAST BP 80-89 MM HG: CPT | Performed by: FAMILY MEDICINE

## 2021-07-16 PROCEDURE — 99214 OFFICE O/P EST MOD 30 MIN: CPT | Performed by: FAMILY MEDICINE

## 2021-07-16 RX ORDER — DULAGLUTIDE 0.75 MG/.5ML
INJECTION, SOLUTION SUBCUTANEOUS
COMMUNITY
Start: 2021-04-10 | End: 2021-07-16

## 2021-07-16 NOTE — PROGRESS NOTES
Bree Dooley 1940 female MRN: [id number]      ASSESSMENT/PLAN  Problem List Items Addressed This Visit        Endocrine    Type 2 diabetes mellitus without complication, without long-term current use of insulin ([facility] [address] ) - Primary    Relevant Orders    IRIS Diabetic eye exam       Cardiovascular and Mediastinum    Hypertension       Musculoskeletal and Integument    Age-related osteoporosis without current pathological fracture    Relevant Orders    DXA bone density spine hip and pelvis       Other    Hyperlipidemia, mixed    Iron deficiency    Relevant Orders    CBC and differential    Iron Panel (Includes Ferritin, Iron Sat%, Iron, and TIBC)      Other Visit Diagnoses     Medicare annual wellness visit, subsequent        Vitamin B12 deficiency        Relevant Orders    Vitamin B12    Other fatigue        Relevant Orders    TSH, 3rd generation with Free T4 reflex        DM: Stop Glimepiride given hypoglycemia and very well controlled DM, continue to monitor sugars  Foot exam as below   Eye exam collected   HTN: Above goal in office, but well within goal at home   HLD: Well controlled   CATY: Update CBC + Iron panel to see if this is contributing to fatigue/dizziness    Also check TSH, B12 given known deficiency     BMI Counseling: Body mass index is 27 32 kg/m²  The BMI is above normal  Nutrition recommendations include 3-5 servings of fruits/vegetables daily  Exercise recommendations include exercising 3-5 times per week  "I'm a walker"      Future Appointments   Date Time Provider Beatriz Duran   7/16/2021 12:30 PM JORGE ROTHMAN [facility] JORGE RILEY   10/18/2021 11:20 AM DO ARABELLA Ryan  [facility]          SUBJECTIVE  CC: Hypertension (Follow up  ), Dizziness (Patient states her blood sugar levels have been low  ), Fatigue, and Medicare Wellness Visit      HPI:  Bree Dooley is a 68 y o  female who presents with her niece for chronic follow up and lab review as below  DM: A1c 6 1% (fasting 90s, low 100s) -- does note some dizziness/lightheadedness, though denies diaphoresis, shaking   HTN: Cr 0 88/GFR 62; Home BPs "in the green"   HLD: Lipids: Total 155, LDL 87, HDL 52, TG 80; LFTs WNL  CATY: On iron supplement -- does note some fatigue     Of note, Ca 10 2  Review of Systems   Constitutional: Positive for fatigue  Respiratory: Negative for cough  Cardiovascular: Negative for chest pain, palpitations and leg swelling  Gastrointestinal: Negative for abdominal distention, constipation and diarrhea  Endocrine: Positive for polyuria  Genitourinary: Negative for dysuria  Neurological: Positive for dizziness, weakness and light-headedness  Psychiatric/Behavioral: Positive for sleep disturbance (restless)         Historical Information   The patient history was reviewed and updated as follows:    Past Medical History:   Diagnosis Date    Diabetes mellitus ([facility] [address] )     Hypertension     Osteoporosis     pt denies on PT eval 20     Past Surgical History:   Procedure Laterality Date     SECTION       Family History   Problem Relation Age of Onset    Diabetes Mother     COPD Mother     Substance Abuse Mother       Social History   Social History     Substance and Sexual Activity   Alcohol Use Never     Social History     Substance and Sexual Activity   Drug Use Never     Social History     Tobacco Use   Smoking Status Never Smoker   Smokeless Tobacco Never Used       Medications:     Current Outpatient Medications:     acarbose (PRECOSE) 50 mg tablet, TAKE 1 TABLET BY MOUTH DAILY WITH BREAKFAST, Disp: 90 tablet, Rfl: 1    Alcohol Swabs (Pharmacist Choice Alcohol) PADS, Use as directed, Disp: , Rfl:     alendronate (FOSAMAX) 70 mg tablet, TAKE 1 TABLET BY MOUTH EVERY 7 DAYS, Disp: 12 tablet, Rfl: 1    amLODIPine (NORVASC) 10 mg tablet, Take 1 tablet (10 mg total) by mouth daily, Disp: 90 tablet, Rfl: 0    aspirin 81 mg chewable tablet, Chew 81 mg daily, Disp: , Rfl:     ferrous sulfate 324 (65 Fe) mg, TAKE 1 TABLET (324 MG TOTAL) BY MOUTH DAILY BEFORE BREAKFAST, Disp: 30 tablet, Rfl: 1    fluorouracil (EFUDEX) 5 % cream, Apply topically 2 (two) times a day For 2 weeks on scalp, Disp: 40 g, Rfl: 0    glucose blood (OneTouch Verio) test strip, USE TO TEST BLOOD SUGAR DAILY AS DIRECTED, Disp: 100 each, Rfl: 3    glucose blood test strip, MEDICARE B, Disp: , Rfl:     Lancet Devices (Adjustable Lancing Device) MISC, MEDICARE B, Disp: , Rfl:     LIFESCAN FINEPOINT LANCETS MISC, by Does not apply route daily, Disp: 100 each, Rfl: 3    lisinopril (ZESTRIL) 20 mg tablet, TAKE 1 TABLET (20 MG TOTAL) BY MOUTH DAILY, Disp: 30 tablet, Rfl: 3    metFORMIN (GLUCOPHAGE) 850 mg tablet, TAKE 1 TABLET BY MOUTH 2 TIMES A DAY WITH MEALS, Disp: 180 tablet, Rfl: 1    Multiple Vitamins-Minerals (PreserVision AREDS 2) CAPS, TAKE 2 CAPSULES BY MOUTH DAILY IN THE MORNING  PATIENT BRINGS STOCK FROM HOME, Disp: , Rfl:     vitamin B-12 (VITAMIN B-12) 1,000 mcg tablet, TAKE ONE TABLET BY MOUTH DAILY IN THE MORNING  PATIENT BRINGS STOCK FROM HOME, Disp: , Rfl:   No Known Allergies    OBJECTIVE    Vitals:   Vitals:    07/16/21 1131 07/16/21 1155   BP: 158/82 162/88   Pulse: 76    Temp: (!) 97 °F (36 1 °C)    SpO2: 97%    Weight: 69 9 kg (154 lb 3 2 oz)    Height: 5' 3" (1 6 m)            Physical Exam  Vitals and nursing note reviewed  Constitutional:       General: She is not in acute distress  Appearance: Normal appearance  HENT:      Head: Normocephalic and atraumatic  Right Ear: Tympanic membrane, ear canal and external ear normal       Left Ear: Tympanic membrane, ear canal and external ear normal    Eyes:      Conjunctiva/sclera: Conjunctivae normal    Cardiovascular:      Rate and Rhythm: Normal rate and regular rhythm  Pulses: no weak pulses          Dorsalis pedis pulses are 2+ on the right side and 2+ on the left side     Pulmonary:      Effort: Pulmonary effort is normal  No respiratory distress  Breath sounds: Normal breath sounds  Abdominal:      General: Bowel sounds are normal  There is no distension  Palpations: Abdomen is soft  Tenderness: There is no abdominal tenderness  Musculoskeletal:      Right lower leg: No edema  Left lower leg: No edema  Feet:      Right foot:      Skin integrity: Dry skin present  No callus  Left foot:      Skin integrity: Dry skin present  No callus  Lymphadenopathy:      Cervical: No cervical adenopathy  Skin:     General: Skin is warm and dry  Neurological:      General: No focal deficit present  Mental Status: She is alert  Psychiatric:         Mood and Affect: Mood normal               Patient's shoes and socks removed  Right Foot/Ankle   Right Foot Inspection  Skin Exam: dry skin no callus and no callus                          Toe Exam:  no right toe deformity  Sensory   Vibration: intact    Monofilament testing: intact  Vascular  Capillary refills: < 3 seconds  The right DP pulse is 2+  Left Foot/Ankle  Left Foot Inspection  Skin Exam: dry skinno callus                         Toe Exam: no left toe deformity                   Sensory   Vibration: intact    Monofilament: intact  Vascular  Capillary refills: < 3 seconds  The left DP pulse is 2+  Assign Risk Category:  No deformity present; No loss of protective sensation;  No weak pulses       Risk: 0        DO Tracie Ryan [address] [facility]   7/16/2021  12:22 PM

## 2021-07-16 NOTE — PROGRESS NOTES
Assessment and Plan:     Problem List Items Addressed This Visit        Endocrine    Type 2 diabetes mellitus without complication, without long-term current use of insulin ([address] ) - Primary    Relevant Orders    IRIS Diabetic eye exam       Cardiovascular and Mediastinum    Hypertension       Musculoskeletal and Integument    Age-related osteoporosis without current pathological fracture    Relevant Orders    DXA bone density spine hip and pelvis       Other    Hyperlipidemia, mixed    Iron deficiency    Relevant Orders    CBC and differential    Iron Panel (Includes Ferritin, Iron Sat%, Iron, and TIBC)      Other Visit Diagnoses     Medicare annual wellness visit, subsequent        Vitamin B12 deficiency        Relevant Orders    Vitamin B12    Other fatigue        Relevant Orders    TSH, 3rd generation with Free T4 reflex           Preventive health issues were discussed with patient, and age appropriate screening tests were ordered as noted in patient's After Visit Summary  Personalized health advice and appropriate referrals for health education or preventive services given if needed, as noted in patient's After Visit Summary       History of Present Illness:     Patient presents for Medicare Annual Wellness visit    Patient Care Team:  Jad Ronquillo PA-C as PCP - General (Family Medicine)     Problem List:     Patient Active Problem List   Diagnosis    Type 2 diabetes mellitus without complication, without long-term current use of insulin ([address] )    Hypertension    Hyperlipidemia, mixed    Age-related osteoporosis without current pathological fracture    Gout    Toenail fungus    Torticollis    Chronic neck pain    Hypercalcemia    Iron deficiency      Past Medical and Surgical History:     Past Medical History:   Diagnosis Date    Diabetes mellitus ([address] )     Hypertension     Osteoporosis     pt denies on PT eval 20     Past Surgical History:   Procedure Laterality Date     SECTION Family History:     Family History   Problem Relation Age of Onset    Diabetes Mother     COPD Mother     Substance Abuse Mother       Social History:     Social History     Socioeconomic History    Marital status: Unknown     Spouse name: Not on file    Number of children: Not on file    Years of education: Not on file    Highest education level: Not on file   Occupational History    Occupation: retired from [employer]  Use    Smoking status: Never Smoker    Smokeless tobacco: Never Used   Vaping Use    Vaping Use: Never used   Substance and Sexual Activity    Alcohol use: Never    Drug use: Never    Sexual activity: Not Currently   Other Topics Concern    Not on file   Social History Narrative    Not on file     Social Determinants of Health     Financial Resource Strain:     Difficulty of Paying Living Expenses:    Food Insecurity:     Worried About [address] [employer] in the Last Year:     [address] in the Last Year:    Transportation Needs:     Lack of Transportation (Medical):      Lack of Transportation (Non-Medical):    Physical Activity:     Days of Exercise per Week:     Minutes of Exercise per Session:    Stress:     Feeling of Stress :    Social Connections:     Frequency of Communication with Friends and Family:     Frequency of Social Gatherings with Friends and Family:     Attends [employer] Services:     Active Member of Clubs or Organizations:     Attends Club or Organization Meetings:     Marital Status:    Intimate Partner Violence:     Fear of Current or Ex-Partner:     Emotionally Abused:     Physically Abused:     Sexually Abused:       Medications and Allergies:     Current Outpatient Medications   Medication Sig Dispense Refill    acarbose (PRECOSE) 50 mg tablet TAKE 1 TABLET BY MOUTH DAILY WITH BREAKFAST 90 tablet 1    Alcohol Swabs (Pharmacist Choice Alcohol) PADS Use as directed      alendronate (FOSAMAX) 70 mg tablet TAKE 1 TABLET BY MOUTH EVERY 7 DAYS 12 tablet 1    amLODIPine (NORVASC) 10 mg tablet Take 1 tablet (10 mg total) by mouth daily 90 tablet 0    aspirin 81 mg chewable tablet Chew 81 mg daily      ferrous sulfate 324 (65 Fe) mg TAKE 1 TABLET (324 MG TOTAL) BY MOUTH DAILY BEFORE BREAKFAST 30 tablet 1    fluorouracil (EFUDEX) 5 % cream Apply topically 2 (two) times a day For 2 weeks on scalp 40 g 0    glucose blood (OneTouch Verio) test strip USE TO TEST BLOOD SUGAR DAILY AS DIRECTED 100 each 3    glucose blood test strip MEDICARE B      Lancet Devices (Adjustable Lancing Device) MISC MEDICARE B      LIFESCAN FINEPOINT LANCETS MISC by Does not apply route daily 100 each 3    lisinopril (ZESTRIL) 20 mg tablet TAKE 1 TABLET (20 MG TOTAL) BY MOUTH DAILY 30 tablet 3    metFORMIN (GLUCOPHAGE) 850 mg tablet TAKE 1 TABLET BY MOUTH 2 TIMES A DAY WITH MEALS 180 tablet 1    Multiple Vitamins-Minerals (PreserVision AREDS 2) CAPS TAKE 2 CAPSULES BY MOUTH DAILY IN THE MORNING  PATIENT BRINGS STOCK FROM HOME      vitamin B-12 (VITAMIN B-12) 1,000 mcg tablet TAKE ONE TABLET BY MOUTH DAILY IN THE MORNING  PATIENT BRINGS STOCK FROM HOME       No current facility-administered medications for this visit  No Known Allergies   Immunizations: There is no immunization history on file for this patient  Health Maintenance:         Topic Date Due    Hepatitis C Screening  Never done         Topic Date Due    Influenza Vaccine (1) 09/01/2021      Medicare Health Risk Assessment:     /88   Pulse 76   Temp (!) 97 °F (36 1 °C)   Ht 5' 3" (1 6 m)   Wt 69 9 kg (154 lb 3 2 oz)   SpO2 97%   BMI 27 32 kg/m²      Kacie Urena is here for her Subsequent Wellness visit  Health Risk Assessment:   Patient rates overall health as good  Patient feels that their physical health rating is slightly better  Patient is satisfied with their life  Eyesight was rated as same  Hearing was rated as same   Patient feels that their emotional and mental health rating is much better  Patients states they are never, rarely angry  Patient states they are never, rarely unusually tired/fatigued  Pain experienced in the last 7 days has been none  Patient states that she has experienced no weight loss or gain in last 6 months  Depression Screening:   PHQ-2 Score: 0      Fall Risk Screening: In the past year, patient has experienced: no history of falling in past year      Urinary Incontinence Screening:   Patient has not leaked urine accidently in the last six months  Home Safety:  Patient does not have trouble with stairs inside or outside of their home  Patient has working smoke alarms and has working carbon monoxide detector  Home safety hazards include: none  Nutrition:   Current diet is Low Carb and Regular  Medications:   Patient is currently taking over-the-counter supplements  OTC medications include: see medication list  Patient is not able to manage medications  Activities of Daily Living (ADLs)/Instrumental Activities of Daily Living (IADLs):   Walk and transfer into and out of bed and chair?: Yes  Dress and groom yourself?: Yes    Bathe or shower yourself?: Yes    Feed yourself?  Yes  Do your laundry/housekeeping?: Yes  Manage your money, pay your bills and track your expenses?: Yes  Make your own meals?: Yes    Do your own shopping?: Yes    Durable Medical Equipment Suppliers  N/A    Previous Hospitalizations:   Any hospitalizations or ED visits within the last 12 months?: No      Advance Care Planning:   Living will: Yes    Advanced directive: Yes      Cognitive Screening:   Provider or family/friend/caregiver concerned regarding cognition?: No    PREVENTIVE SCREENINGS      Cardiovascular Screening:    General: Screening Not Indicated and History Lipid Disorder      Diabetes Screening:     General: Screening Not Indicated and History Diabetes      Colorectal Cancer Screening:     General: Screening Not Indicated      Breast Cancer Screening:     General: Screening Not Indicated      Cervical Cancer Screening:    General: Screening Not Indicated      Osteoporosis Screening:    General: Screening Not Indicated and History Osteoporosis    Due for: DXA Appendicular      Abdominal Aortic Aneurysm (AAA) Screening:        General: Screening Not Indicated      Lung Cancer Screening:     General: Screening Not Indicated      Hepatitis C Screening:    General: Screening Not Indicated    Screening, Brief Intervention, and Referral to Treatment (SBIRT)    Screening  Typical number of drinks in a day: 0  Typical number of drinks in a week: 0  Interpretation: Low risk drinking behavior      Single Item Drug Screening:  How often have you used an illegal drug (including marijuana) or a prescription medication for non-medical reasons in the past year? never    Single Item Drug Screen Score: 0  Interpretation: Negative screen for possible drug use disorder      Kaylee Brown, DO

## 2021-07-16 NOTE — PATIENT INSTRUCTIONS
Medicare Preventive Visit Patient Instructions  Thank you for completing your Welcome to Medicare Visit or Medicare Annual Wellness Visit today  Your next wellness visit will be due in one year (7/17/2022)  The screening/preventive services that you may require over the next 5-10 years are detailed below  Some tests may not apply to you based off risk factors and/or age  Screening tests ordered at today's visit but not completed yet may show as past due  Also, please note that scanned in results may not display below  Preventive Screenings:  Service Recommendations Previous Testing/Comments   Colorectal Cancer Screening  * Colonoscopy    * Fecal Occult Blood Test (FOBT)/Fecal Immunochemical Test (FIT)  * Fecal DNA/Cologuard Test  * Flexible Sigmoidoscopy Age: 54-65 years old   Colonoscopy: every 10 years (may be performed more frequently if at higher risk)  OR  FOBT/FIT: every 1 year  OR  Cologuard: every 3 years  OR  Sigmoidoscopy: every 5 years  Screening may be recommended earlier than age 48 if at higher risk for colorectal cancer  Also, an individualized decision between you and your healthcare provider will decide whether screening between the ages of 74-80 would be appropriate  Colonoscopy: Not on file  FOBT/FIT: Not on file  Cologuard: Not on file  Sigmoidoscopy: Not on file          Breast Cancer Screening Age: 36 years old  Frequency: every 1-2 years  Not required if history of left and right mastectomy Mammogram: Not on file        Cervical Cancer Screening Between the ages of 21-29, pap smear recommended once every 3 years  Between the ages of 33-67, can perform pap smear with HPV co-testing every 5 years     Recommendations may differ for women with a history of total hysterectomy, cervical cancer, or abnormal pap smears in past  Pap Smear: Not on file    Screening Not Indicated   Hepatitis C Screening Once for adults born between [facility]  More frequently in patients at high risk for Hepatitis C Hep C Antibody: Not on file        Diabetes Screening 1-2 times per year if you're at risk for diabetes or have pre-diabetes Fasting glucose: 110 mg/dL   A1C: 6 1 %    Screening Not Indicated  History Diabetes   Cholesterol Screening Once every 5 years if you don't have a lipid disorder  May order more often based on risk factors  Lipid panel: 06/28/2021    Screening Not Indicated  History Lipid Disorder     Other Preventive Screenings Covered by Medicare:  1  Abdominal Aortic Aneurysm (AAA) Screening: covered once if your at risk  You're considered to be at risk if you have a family history of AAA  2  Lung Cancer Screening: covers low dose CT scan once per year if you meet all of the following conditions: (1) Age 50-69; (2) No signs or symptoms of lung cancer; (3) Current smoker or have quit smoking within the last 15 years; (4) You have a tobacco smoking history of at least 30 pack years (packs per day multiplied by number of years you smoked); (5) You get a written order from a healthcare provider  3  Glaucoma Screening: covered annually if you're considered high risk: (1) You have diabetes OR (2) Family history of glaucoma OR (3)  aged 48 and older OR (3)  American aged 72 and older  3  Osteoporosis Screening: covered every 2 years if you meet one of the following conditions: (1) You're estrogen deficient and at risk for osteoporosis based off medical history and other findings; (2) Have a vertebral abnormality; (3) On glucocorticoid therapy for more than 3 months; (4) Have primary hyperparathyroidism; (5) On osteoporosis medications and need to assess response to drug therapy  · Last bone density test (DXA Scan): Not on file  5  HIV Screening: covered annually if you're between the age of 12-76  Also covered annually if you are younger than 13 and older than 72 with risk factors for HIV infection   For pregnant patients, it is covered up to 3 times per pregnancy  Immunizations:  Immunization Recommendations   Influenza Vaccine Annual influenza vaccination during flu season is recommended for all persons aged >= 6 months who do not have contraindications   Pneumococcal Vaccine (Prevnar and Pneumovax)  * Prevnar = PCV13  * Pneumovax = PPSV23   Adults 25-60 years old: 1-3 doses may be recommended based on certain risk factors  Adults 72 years old: Prevnar (PCV13) vaccine recommended followed by Pneumovax (PPSV23) vaccine  If already received PPSV23 since turning 65, then PCV13 recommended at least one year after PPSV23 dose  Hepatitis B Vaccine 3 dose series if at intermediate or high risk (ex: diabetes, end stage renal disease, liver disease)   Tetanus (Td) Vaccine - COST NOT COVERED BY MEDICARE PART B Following completion of primary series, a booster dose should be given every 10 years to maintain immunity against tetanus  Td may also be given as tetanus wound prophylaxis  Tdap Vaccine - COST NOT COVERED BY MEDICARE PART B Recommended at least once for all adults  For pregnant patients, recommended with each pregnancy  Shingles Vaccine (Shingrix) - COST NOT COVERED BY MEDICARE PART B  2 shot series recommended in those aged 48 and above     Health Maintenance Due:      Topic Date Due    Hepatitis C Screening  Never done     Immunizations Due:      Topic Date Due    Influenza Vaccine (1) 09/01/2021     Advance Directives   What are advance directives? Advance directives are legal documents that state your wishes and plans for medical care  These plans are made ahead of time in case you lose your ability to make decisions for yourself  Advance directives can apply to any medical decision, such as the treatments you want, and if you want to donate organs  What are the types of advance directives? There are many types of advance directives, and each state has rules about how to use them   You may choose a combination of any of the following:  · Living will:  This is a written record of the treatment you want  You can also choose which treatments you do not want, which to limit, and which to stop at a certain time  This includes surgery, medicine, IV fluid, and tube feedings  · Durable power of  for healthcare [facility]): This is a written record that states who you want to make healthcare choices for you when you are unable to make them for yourself  This person, called a proxy, is usually a family member or a friend  You may choose more than 1 proxy  · Do not resuscitate (DNR) order:  A DNR order is used in case your heart stops beating or you stop breathing  It is a request not to have certain forms of treatment, such as CPR  A DNR order may be included in other types of advance directives  · Medical directive: This covers the care that you want if you are in a coma, near death, or unable to make decisions for yourself  You can list the treatments you want for each condition  Treatment may include pain medicine, surgery, blood transfusions, dialysis, IV or tube feedings, and a ventilator (breathing machine)  · Values history: This document has questions about your views, beliefs, and how you feel and think about life  This information can help others choose the care that you would choose  Why are advance directives important? An advance directive helps you control your care  Although spoken wishes may be used, it is better to have your wishes written down  Spoken wishes can be misunderstood, or not followed  Treatments may be given even if you do not want them  An advance directive may make it easier for your family to make difficult choices about your care  Weight Management   Why it is important to manage your weight:  Being overweight increases your risk of health conditions such as heart disease, high blood pressure, type 2 diabetes, and certain types of cancer   It can also increase your risk for osteoarthritis, sleep apnea, and other respiratory problems  Aim for a slow, steady weight loss  Even a small amount of weight loss can lower your risk of health problems  How to lose weight safely:  A safe and healthy way to lose weight is to eat fewer calories and get regular exercise  You can lose up about 1 pound a week by decreasing the number of calories you eat by 500 calories each day  Healthy meal plan for weight management:  A healthy meal plan includes a variety of foods, contains fewer calories, and helps you stay healthy  A healthy meal plan includes the following:  · Eat whole-grain foods more often  A healthy meal plan should contain fiber  Fiber is the part of grains, fruits, and vegetables that is not broken down by your body  Whole-grain foods are healthy and provide extra fiber in your diet  Some examples of whole-grain foods are whole-wheat breads and pastas, oatmeal, brown rice, and bulgur  · Eat a variety of vegetables every day  Include dark, leafy greens such as spinach, kale, fortino greens, and mustard greens  Eat yellow and orange vegetables such as carrots, sweet potatoes, and winter squash  · Eat a variety of fruits every day  Choose fresh or canned fruit (canned in its own juice or light syrup) instead of juice  Fruit juice has very little or no fiber  · Eat low-fat dairy foods  Drink fat-free (skim) milk or 1% milk  Eat fat-free yogurt and low-fat cottage cheese  Try low-fat cheeses such as mozzarella and other reduced-fat cheeses  · Choose meat and other protein foods that are low in fat  Choose beans or other legumes such as split peas or lentils  Choose fish, skinless poultry (chicken or turkey), or lean cuts of red meat (beef or pork)  Before you cook meat or poultry, cut off any visible fat  · Use less fat and oil  Try baking foods instead of frying them  Add less fat, such as margarine, sour cream, regular salad dressing and mayonnaise to foods  Eat fewer high-fat foods   Some examples of high-fat foods include french fries, doughnuts, ice cream, and cakes  · Eat fewer sweets  Limit foods and drinks that are high in sugar  This includes candy, cookies, regular soda, and sweetened drinks  Exercise:  Exercise at least 30 minutes per day on most days of the week  Some examples of exercise include walking, biking, dancing, and swimming  You can also fit in more physical activity by taking the stairs instead of the elevator or parking farther away from stores  Ask your healthcare provider about the best exercise plan for you  © Copyright [employer] 2018 Information is for End User's use only and may not be sold, redistributed or otherwise used for commercial purposes   All illustrations and images included in CareNotes® are the copyrighted property of A [employer]  or [address] [place], [place] [address]julia len

## 2021-07-18 LAB
LEFT EYE DIABETIC RETINOPATHY: NORMAL
LEFT EYE IMAGE QUALITY: NORMAL
LEFT EYE MACULAR EDEMA: NORMAL
LEFT EYE OTHER RETINOPATHY: NORMAL
RIGHT EYE DIABETIC RETINOPATHY: NORMAL
RIGHT EYE IMAGE QUALITY: NORMAL
RIGHT EYE MACULAR EDEMA: NORMAL
RIGHT EYE OTHER RETINOPATHY: NORMAL
SEVERITY (EYE EXAM): NORMAL

## 2021-08-27 DIAGNOSIS — E11.9 TYPE 2 DIABETES MELLITUS WITHOUT COMPLICATION, WITHOUT LONG-TERM CURRENT USE OF INSULIN (HCC): ICD-10-CM

## 2021-08-27 DIAGNOSIS — E61.1 IRON DEFICIENCY: ICD-10-CM

## 2021-08-27 DIAGNOSIS — I10 ESSENTIAL HYPERTENSION: ICD-10-CM

## 2021-08-30 RX ORDER — FERROUS SULFATE TAB EC 324 MG (65 MG FE EQUIVALENT) 324 (65 FE) MG
324 TABLET DELAYED RESPONSE ORAL
Qty: 30 TABLET | Refills: 1 | Status: SHIPPED | OUTPATIENT
Start: 2021-08-30 | End: 2021-09-28

## 2021-08-30 RX ORDER — AMLODIPINE BESYLATE 5 MG/1
TABLET ORAL
Qty: 30 TABLET | Refills: 0 | Status: SHIPPED | OUTPATIENT
Start: 2021-08-30 | End: 2021-10-18 | Stop reason: ALTCHOICE

## 2021-08-30 RX ORDER — LISINOPRIL 20 MG/1
20 TABLET ORAL DAILY
Qty: 30 TABLET | Refills: 3 | Status: SHIPPED | OUTPATIENT
Start: 2021-08-30 | End: 2022-03-11

## 2021-08-30 RX ORDER — BLOOD SUGAR DIAGNOSTIC
STRIP MISCELLANEOUS
Qty: 100 EACH | Refills: 3 | Status: SHIPPED | OUTPATIENT
Start: 2021-08-30 | End: 2021-10-01

## 2021-08-31 ENCOUNTER — HOSPITAL ENCOUNTER (OUTPATIENT)
Dept: MAMMOGRAPHY | Facility: CLINIC | Age: 81
Discharge: HOME | End: 2021-08-31
Payer: COMMERCIAL

## 2021-08-31 DIAGNOSIS — M81.0 AGE-RELATED OSTEOPOROSIS WITHOUT CURRENT PATHOLOGICAL FRACTURE: ICD-10-CM

## 2021-08-31 PROCEDURE — 77080 DXA BONE DENSITY AXIAL: CPT

## 2021-09-13 DIAGNOSIS — B35.1 TOENAIL FUNGUS: ICD-10-CM

## 2021-09-13 DIAGNOSIS — E78.2 HYPERLIPIDEMIA, MIXED: ICD-10-CM

## 2021-09-13 DIAGNOSIS — E11.9 TYPE 2 DIABETES MELLITUS WITHOUT COMPLICATION, WITHOUT LONG-TERM CURRENT USE OF INSULIN (HCC): ICD-10-CM

## 2021-09-13 DIAGNOSIS — M1A.4710 OTHER SECONDARY CHRONIC GOUT OF RIGHT ANKLE WITHOUT TOPHUS: ICD-10-CM

## 2021-09-13 DIAGNOSIS — I10 ESSENTIAL HYPERTENSION: ICD-10-CM

## 2021-09-13 DIAGNOSIS — Z12.83 SKIN CANCER SCREENING: ICD-10-CM

## 2021-09-13 DIAGNOSIS — Z13.220 LIPID SCREENING: ICD-10-CM

## 2021-09-13 DIAGNOSIS — M43.6 TORTICOLLIS: ICD-10-CM

## 2021-09-13 DIAGNOSIS — M81.0 AGE-RELATED OSTEOPOROSIS WITHOUT CURRENT PATHOLOGICAL FRACTURE: ICD-10-CM

## 2021-09-13 DIAGNOSIS — Z00.00 MEDICARE ANNUAL WELLNESS VISIT, SUBSEQUENT: ICD-10-CM

## 2021-09-28 DIAGNOSIS — E61.1 IRON DEFICIENCY: ICD-10-CM

## 2021-09-28 RX ORDER — FERROUS SULFATE TAB EC 324 MG (65 MG FE EQUIVALENT) 324 (65 FE) MG
324 TABLET DELAYED RESPONSE ORAL
Qty: 30 TABLET | Refills: 1 | Status: SHIPPED | OUTPATIENT
Start: 2021-09-28 | End: 2021-11-18

## 2021-10-01 DIAGNOSIS — E11.9 TYPE 2 DIABETES MELLITUS WITHOUT COMPLICATION, WITHOUT LONG-TERM CURRENT USE OF INSULIN (HCC): ICD-10-CM

## 2021-10-01 RX ORDER — BLOOD SUGAR DIAGNOSTIC
STRIP MISCELLANEOUS
Qty: 100 EACH | Refills: 3 | Status: SHIPPED | OUTPATIENT
Start: 2021-10-01 | End: 2021-12-27

## 2021-10-05 ENCOUNTER — VBI (OUTPATIENT)
Dept: ADMINISTRATIVE | Facility: OTHER | Age: 81
End: 2021-10-05

## 2021-10-13 ENCOUNTER — TELEPHONE (OUTPATIENT)
Dept: FAMILY MEDICINE CLINIC | Facility: CLINIC | Age: 81
End: 2021-10-13

## 2021-10-13 ENCOUNTER — APPOINTMENT (OUTPATIENT)
Dept: LAB | Facility: CLINIC | Age: 81
End: 2021-10-13
Payer: COMMERCIAL

## 2021-10-13 DIAGNOSIS — E11.9 TYPE 2 DIABETES MELLITUS WITHOUT COMPLICATION, WITHOUT LONG-TERM CURRENT USE OF INSULIN (HCC): ICD-10-CM

## 2021-10-13 DIAGNOSIS — I10 PRIMARY HYPERTENSION: ICD-10-CM

## 2021-10-13 DIAGNOSIS — E11.9 TYPE 2 DIABETES MELLITUS WITHOUT COMPLICATION, WITHOUT LONG-TERM CURRENT USE OF INSULIN (HCC): Primary | ICD-10-CM

## 2021-10-13 DIAGNOSIS — E78.2 HYPERLIPIDEMIA, MIXED: ICD-10-CM

## 2021-10-13 LAB
ALBUMIN SERPL BCP-MCNC: 3.1 G/DL (ref 3.5–5)
ALP SERPL-CCNC: 63 U/L (ref 46–116)
ALT SERPL W P-5'-P-CCNC: 21 U/L (ref 12–78)
ANION GAP SERPL CALCULATED.3IONS-SCNC: 3 MMOL/L (ref 4–13)
AST SERPL W P-5'-P-CCNC: 12 U/L (ref 5–45)
BILIRUB SERPL-MCNC: 0.45 MG/DL (ref 0.2–1)
BUN SERPL-MCNC: 14 MG/DL (ref 5–25)
CALCIUM ALBUM COR SERPL-MCNC: 10.4 MG/DL (ref 8.3–10.1)
CALCIUM SERPL-MCNC: 9.7 MG/DL (ref 8.3–10.1)
CHLORIDE SERPL-SCNC: 104 MMOL/L (ref 100–108)
CHOLEST SERPL-MCNC: 165 MG/DL (ref 50–200)
CO2 SERPL-SCNC: 30 MMOL/L (ref 21–32)
CREAT SERPL-MCNC: 0.81 MG/DL (ref 0.6–1.3)
EST. AVERAGE GLUCOSE BLD GHB EST-MCNC: 177 MG/DL
GFR SERPL CREATININE-BSD FRML MDRD: 69 ML/MIN/1.73SQ M
GLUCOSE P FAST SERPL-MCNC: 182 MG/DL (ref 65–99)
HBA1C MFR BLD: 7.8 %
HDLC SERPL-MCNC: 54 MG/DL
LDLC SERPL CALC-MCNC: 89 MG/DL (ref 0–100)
POTASSIUM SERPL-SCNC: 3.8 MMOL/L (ref 3.5–5.3)
PROT SERPL-MCNC: 7.5 G/DL (ref 6.4–8.2)
SODIUM SERPL-SCNC: 137 MMOL/L (ref 136–145)
TRIGL SERPL-MCNC: 109 MG/DL

## 2021-10-13 PROCEDURE — 36415 COLL VENOUS BLD VENIPUNCTURE: CPT

## 2021-10-13 PROCEDURE — 83036 HEMOGLOBIN GLYCOSYLATED A1C: CPT

## 2021-10-13 PROCEDURE — 80061 LIPID PANEL: CPT

## 2021-10-13 PROCEDURE — 80053 COMPREHEN METABOLIC PANEL: CPT

## 2021-10-18 ENCOUNTER — OFFICE VISIT (OUTPATIENT)
Dept: FAMILY MEDICINE CLINIC | Facility: CLINIC | Age: 81
End: 2021-10-18
Payer: COMMERCIAL

## 2021-10-18 VITALS
DIASTOLIC BLOOD PRESSURE: 79 MMHG | BODY MASS INDEX: 26.05 KG/M2 | HEIGHT: 63 IN | SYSTOLIC BLOOD PRESSURE: 131 MMHG | OXYGEN SATURATION: 99 % | HEART RATE: 71 BPM | WEIGHT: 147 LBS | TEMPERATURE: 97.3 F

## 2021-10-18 DIAGNOSIS — I10 PRIMARY HYPERTENSION: ICD-10-CM

## 2021-10-18 DIAGNOSIS — E11.9 TYPE 2 DIABETES MELLITUS WITHOUT COMPLICATION, WITHOUT LONG-TERM CURRENT USE OF INSULIN (HCC): Primary | ICD-10-CM

## 2021-10-18 DIAGNOSIS — E78.2 HYPERLIPIDEMIA, MIXED: ICD-10-CM

## 2021-10-18 PROBLEM — E11.29 TYPE 2 DIABETES MELLITUS WITH MICROALBUMINURIA, WITHOUT LONG-TERM CURRENT USE OF INSULIN (HCC): Status: ACTIVE | Noted: 2020-06-04

## 2021-10-18 PROBLEM — R80.9 TYPE 2 DIABETES MELLITUS WITH MICROALBUMINURIA, WITHOUT LONG-TERM CURRENT USE OF INSULIN (HCC): Status: ACTIVE | Noted: 2020-06-04

## 2021-10-18 LAB
ALBUMIN/CREAT UR: ABNORMAL
CREAT SERPL-MCNC: 300 MG/DL
SL AMB POCT UR MICROALBUMIN: 80

## 2021-10-18 PROCEDURE — 99214 OFFICE O/P EST MOD 30 MIN: CPT | Performed by: FAMILY MEDICINE

## 2021-10-18 PROCEDURE — 3075F SYST BP GE 130 - 139MM HG: CPT | Performed by: FAMILY MEDICINE

## 2021-10-18 PROCEDURE — 1160F RVW MEDS BY RX/DR IN RCRD: CPT | Performed by: FAMILY MEDICINE

## 2021-10-18 PROCEDURE — 82043 UR ALBUMIN QUANTITATIVE: CPT | Performed by: FAMILY MEDICINE

## 2021-10-18 PROCEDURE — 1036F TOBACCO NON-USER: CPT | Performed by: FAMILY MEDICINE

## 2021-10-18 PROCEDURE — 82570 ASSAY OF URINE CREATININE: CPT | Performed by: FAMILY MEDICINE

## 2021-10-18 PROCEDURE — 3078F DIAST BP <80 MM HG: CPT | Performed by: FAMILY MEDICINE

## 2021-11-10 ENCOUNTER — VBI (OUTPATIENT)
Dept: ADMINISTRATIVE | Facility: OTHER | Age: 81
End: 2021-11-10

## 2021-11-18 DIAGNOSIS — E61.1 IRON DEFICIENCY: ICD-10-CM

## 2021-11-18 RX ORDER — FERROUS SULFATE TAB EC 324 MG (65 MG FE EQUIVALENT) 324 (65 FE) MG
324 TABLET DELAYED RESPONSE ORAL
Qty: 30 TABLET | Refills: 1 | Status: SHIPPED | OUTPATIENT
Start: 2021-11-18 | End: 2021-12-07

## 2021-12-07 DIAGNOSIS — M1A.4710 OTHER SECONDARY CHRONIC GOUT OF RIGHT ANKLE WITHOUT TOPHUS: ICD-10-CM

## 2021-12-07 DIAGNOSIS — E61.1 IRON DEFICIENCY: ICD-10-CM

## 2021-12-07 DIAGNOSIS — Z00.00 MEDICARE ANNUAL WELLNESS VISIT, SUBSEQUENT: ICD-10-CM

## 2021-12-07 DIAGNOSIS — E11.9 TYPE 2 DIABETES MELLITUS WITHOUT COMPLICATION, WITHOUT LONG-TERM CURRENT USE OF INSULIN (HCC): ICD-10-CM

## 2021-12-07 DIAGNOSIS — Z12.83 SKIN CANCER SCREENING: ICD-10-CM

## 2021-12-07 DIAGNOSIS — B35.1 TOENAIL FUNGUS: ICD-10-CM

## 2021-12-07 DIAGNOSIS — Z13.220 LIPID SCREENING: ICD-10-CM

## 2021-12-07 DIAGNOSIS — I10 ESSENTIAL HYPERTENSION: ICD-10-CM

## 2021-12-07 DIAGNOSIS — M81.0 AGE-RELATED OSTEOPOROSIS WITHOUT CURRENT PATHOLOGICAL FRACTURE: ICD-10-CM

## 2021-12-07 DIAGNOSIS — M43.6 TORTICOLLIS: ICD-10-CM

## 2021-12-07 DIAGNOSIS — E78.2 HYPERLIPIDEMIA, MIXED: ICD-10-CM

## 2021-12-07 RX ORDER — FERROUS SULFATE TAB EC 324 MG (65 MG FE EQUIVALENT) 324 (65 FE) MG
324 TABLET DELAYED RESPONSE ORAL
Qty: 30 TABLET | Refills: 1 | Status: SHIPPED | OUTPATIENT
Start: 2021-12-07 | End: 2022-01-02

## 2021-12-07 RX ORDER — ACARBOSE 50 MG/1
TABLET ORAL
Qty: 90 TABLET | Refills: 1 | Status: SHIPPED | OUTPATIENT
Start: 2021-12-07 | End: 2022-03-11

## 2021-12-27 DIAGNOSIS — E11.9 TYPE 2 DIABETES MELLITUS WITHOUT COMPLICATION, WITHOUT LONG-TERM CURRENT USE OF INSULIN (HCC): ICD-10-CM

## 2021-12-27 RX ORDER — BLOOD SUGAR DIAGNOSTIC
STRIP MISCELLANEOUS
Qty: 100 EACH | Refills: 3 | Status: SHIPPED | OUTPATIENT
Start: 2021-12-27 | End: 2022-03-31

## 2021-12-31 DIAGNOSIS — E61.1 IRON DEFICIENCY: ICD-10-CM

## 2022-01-02 RX ORDER — FERROUS SULFATE TAB EC 324 MG (65 MG FE EQUIVALENT) 324 (65 FE) MG
324 TABLET DELAYED RESPONSE ORAL
Qty: 30 TABLET | Refills: 1 | Status: SHIPPED | OUTPATIENT
Start: 2022-01-02 | End: 2022-03-11

## 2022-01-10 ENCOUNTER — TELEPHONE (OUTPATIENT)
Dept: FAMILY MEDICINE CLINIC | Facility: CLINIC | Age: 82
End: 2022-01-10

## 2022-01-10 DIAGNOSIS — R80.9 TYPE 2 DIABETES MELLITUS WITH MICROALBUMINURIA, WITHOUT LONG-TERM CURRENT USE OF INSULIN (HCC): Primary | ICD-10-CM

## 2022-01-10 DIAGNOSIS — E11.29 TYPE 2 DIABETES MELLITUS WITH MICROALBUMINURIA, WITHOUT LONG-TERM CURRENT USE OF INSULIN (HCC): Primary | ICD-10-CM

## 2022-01-10 DIAGNOSIS — E78.2 HYPERLIPIDEMIA, MIXED: ICD-10-CM

## 2022-01-10 NOTE — TELEPHONE ENCOUNTER
Pt stopped in she is asking if she is due for labs prior to her appt with you next week  Please order if any and let pt know when done

## 2022-01-11 ENCOUNTER — APPOINTMENT (OUTPATIENT)
Dept: LAB | Facility: CLINIC | Age: 82
End: 2022-01-11
Payer: COMMERCIAL

## 2022-01-11 DIAGNOSIS — R80.9 TYPE 2 DIABETES MELLITUS WITH MICROALBUMINURIA, WITHOUT LONG-TERM CURRENT USE OF INSULIN (HCC): ICD-10-CM

## 2022-01-11 DIAGNOSIS — E11.29 TYPE 2 DIABETES MELLITUS WITH MICROALBUMINURIA, WITHOUT LONG-TERM CURRENT USE OF INSULIN (HCC): ICD-10-CM

## 2022-01-11 DIAGNOSIS — E78.2 HYPERLIPIDEMIA, MIXED: ICD-10-CM

## 2022-01-11 LAB
ALBUMIN SERPL BCP-MCNC: 3.5 G/DL (ref 3.5–5)
ALP SERPL-CCNC: 58 U/L (ref 46–116)
ALT SERPL W P-5'-P-CCNC: 19 U/L (ref 12–78)
ANION GAP SERPL CALCULATED.3IONS-SCNC: 4 MMOL/L (ref 4–13)
AST SERPL W P-5'-P-CCNC: 13 U/L (ref 5–45)
BILIRUB SERPL-MCNC: 0.79 MG/DL (ref 0.2–1)
BUN SERPL-MCNC: 19 MG/DL (ref 5–25)
CALCIUM SERPL-MCNC: 10.2 MG/DL (ref 8.3–10.1)
CHLORIDE SERPL-SCNC: 103 MMOL/L (ref 100–108)
CHOLEST SERPL-MCNC: 166 MG/DL
CO2 SERPL-SCNC: 32 MMOL/L (ref 21–32)
CREAT SERPL-MCNC: 0.83 MG/DL (ref 0.6–1.3)
EST. AVERAGE GLUCOSE BLD GHB EST-MCNC: 171 MG/DL
GFR SERPL CREATININE-BSD FRML MDRD: 67 ML/MIN/1.73SQ M
GLUCOSE P FAST SERPL-MCNC: 164 MG/DL (ref 65–99)
HBA1C MFR BLD: 7.6 %
HDLC SERPL-MCNC: 59 MG/DL
LDLC SERPL CALC-MCNC: 89 MG/DL (ref 0–100)
POTASSIUM SERPL-SCNC: 4 MMOL/L (ref 3.5–5.3)
PROT SERPL-MCNC: 7.5 G/DL (ref 6.4–8.2)
SODIUM SERPL-SCNC: 139 MMOL/L (ref 136–145)
TRIGL SERPL-MCNC: 88 MG/DL

## 2022-01-11 PROCEDURE — 83036 HEMOGLOBIN GLYCOSYLATED A1C: CPT

## 2022-01-11 PROCEDURE — 36415 COLL VENOUS BLD VENIPUNCTURE: CPT

## 2022-01-11 PROCEDURE — 80053 COMPREHEN METABOLIC PANEL: CPT

## 2022-01-11 PROCEDURE — 80061 LIPID PANEL: CPT

## 2022-01-17 ENCOUNTER — OFFICE VISIT (OUTPATIENT)
Dept: FAMILY MEDICINE CLINIC | Facility: CLINIC | Age: 82
End: 2022-01-17
Payer: COMMERCIAL

## 2022-01-17 VITALS
HEART RATE: 97 BPM | TEMPERATURE: 97.1 F | BODY MASS INDEX: 25.2 KG/M2 | DIASTOLIC BLOOD PRESSURE: 83 MMHG | WEIGHT: 142.2 LBS | HEIGHT: 63 IN | OXYGEN SATURATION: 100 % | SYSTOLIC BLOOD PRESSURE: 119 MMHG

## 2022-01-17 DIAGNOSIS — E11.29 TYPE 2 DIABETES MELLITUS WITH MICROALBUMINURIA, WITHOUT LONG-TERM CURRENT USE OF INSULIN (HCC): Primary | ICD-10-CM

## 2022-01-17 DIAGNOSIS — R80.9 TYPE 2 DIABETES MELLITUS WITH MICROALBUMINURIA, WITHOUT LONG-TERM CURRENT USE OF INSULIN (HCC): Primary | ICD-10-CM

## 2022-01-17 DIAGNOSIS — I10 PRIMARY HYPERTENSION: ICD-10-CM

## 2022-01-17 DIAGNOSIS — E78.2 HYPERLIPIDEMIA, MIXED: ICD-10-CM

## 2022-01-17 PROCEDURE — 3079F DIAST BP 80-89 MM HG: CPT | Performed by: FAMILY MEDICINE

## 2022-01-17 PROCEDURE — 1160F RVW MEDS BY RX/DR IN RCRD: CPT | Performed by: FAMILY MEDICINE

## 2022-01-17 PROCEDURE — 1036F TOBACCO NON-USER: CPT | Performed by: FAMILY MEDICINE

## 2022-01-17 PROCEDURE — 99214 OFFICE O/P EST MOD 30 MIN: CPT | Performed by: FAMILY MEDICINE

## 2022-01-17 PROCEDURE — 3074F SYST BP LT 130 MM HG: CPT | Performed by: FAMILY MEDICINE

## 2022-01-17 RX ORDER — DULAGLUTIDE 0.75 MG/.5ML
INJECTION, SOLUTION SUBCUTANEOUS
COMMUNITY
Start: 2021-11-02 | End: 2022-07-20 | Stop reason: ALTCHOICE

## 2022-01-17 NOTE — PROGRESS NOTES
Foreign Falcon 1940 female MRN: [id number]      ASSESSMENT/PLAN  Problem List Items Addressed This Visit        Endocrine    Type 2 diabetes mellitus with microalbuminuria, without long-term current use of insulin ([facility] [address] ) - Primary    Relevant Medications    Trulicity 9 15 HP/8 5JJ SOPN    Other Relevant Orders    Ambulatory referral to Diabetic Education       Cardiovascular and Mediastinum    Hypertension       Other    Hyperlipidemia, mixed        DM: Improved, given her age A1c in reasonable range  Continue current regimen  Refer to DM Ed  HTN: Within goal   HLD: Well controlled        No future appointments  SUBJECTIVE  CC: Lab review and Diabetes      HPI:  Foreign Falcon is a 80 y o  female who presents with her niece for chronic follow up and lab review as below  DM: A1c 7 6% (down from 7 8%); home sugars 100s AM fasting; denies hypo/hyperglycemic symptoms   HTN: Home BPs none   HLD: Lipids: Total 166, LDL 89, HDL 59, TG 88; LFTs WNL     Cr 0 83/GFR 67 (stable)      Review of Systems   Constitutional: Negative for diaphoresis  Eyes: Negative for visual disturbance  Respiratory: Negative for cough and shortness of breath  Cardiovascular: Negative for chest pain, palpitations and leg swelling  Gastrointestinal: Negative for abdominal pain, constipation and diarrhea  Endocrine: Negative for polydipsia and polyuria  Neurological: Negative for dizziness, tremors and headaches         Historical Information   The patient history was reviewed and updated as follows:    Past Medical History:   Diagnosis Date    Diabetes mellitus ([facility] [address] )     Hypertension     Osteoporosis     pt denies on PT eval 20     Past Surgical History:   Procedure Laterality Date     SECTION       Family History   Problem Relation Age of Onset    Diabetes Mother     COPD Mother     Substance Abuse Mother       Social History   Social History     Substance and Sexual Activity   Alcohol Use Never Social History     Substance and Sexual Activity   Drug Use Never     Social History     Tobacco Use   Smoking Status Never Smoker   Smokeless Tobacco Never Used       Medications:     Current Outpatient Medications:     acarbose (PRECOSE) 50 mg tablet, TAKE 1 TABLET BY MOUTH DAILY WITH BREAKFAST, Disp: 90 tablet, Rfl: 1    Alcohol Swabs (Pharmacist Choice Alcohol) PADS, Use as directed, Disp: , Rfl:     aspirin 81 mg chewable tablet, Chew 81 mg daily, Disp: , Rfl:     ferrous sulfate 324 (65 Fe) mg, TAKE 1 TABLET (324 MG TOTAL) BY MOUTH DAILY BEFORE BREAKFAST, Disp: 30 tablet, Rfl: 1    glucose blood test strip, MEDICARE B, Disp: , Rfl:     Lancet Devices (Adjustable Lancing Device) MISC, MEDICARE B, Disp: , Rfl:     [employer] FINEPOINT LANCETS MISC, by Does not apply route daily, Disp: 100 each, Rfl: 3    lisinopril (ZESTRIL) 20 mg tablet, TAKE 1 TABLET (20 MG TOTAL) BY MOUTH DAILY, Disp: 30 tablet, Rfl: 3    metFORMIN (GLUCOPHAGE) 850 mg tablet, TAKE ONE TABLET BY MOUTH TWICE DAILY WITH MEALS, Disp: 180 tablet, Rfl: 1    Multiple Vitamins-Minerals (PreserVision AREDS 2) CAPS, TAKE 2 CAPSULES BY MOUTH DAILY IN THE MORNING  PATIENT BRINGS STOCK FROM HOME, Disp: , Rfl:     OneTouch Verio test strip, USE TO TEST BLOOD SUGAR DAILY AS DIRECTED, Disp: 100 each, Rfl: 3    Trulicity 8 50 XD/8 3MT SOPN, INJECT 0 5 ML (0 75 MG TOTAL) UNDER THE SKIN ONCE A WEEK, Disp: , Rfl:     vitamin B-12 (VITAMIN B-12) 1,000 mcg tablet, TAKE ONE TABLET BY MOUTH DAILY IN THE MORNING  PATIENT BRINGS STOCK FROM HOME, Disp: , Rfl:   No Known Allergies    OBJECTIVE    Vitals:   Vitals:    01/17/22 1133   BP: 119/83   Pulse: 97   Temp: (!) 97 1 °F (36 2 °C)   SpO2: 100%   Weight: 64 5 kg (142 lb 3 2 oz)   Height: 5' 3" (1 6 m)           Physical Exam  Vitals and nursing note reviewed  Constitutional:       General: She is not in acute distress  Appearance: Normal appearance     HENT:      Head: Normocephalic and atraumatic  Right Ear: Tympanic membrane, ear canal and external ear normal       Left Ear: Tympanic membrane, ear canal and external ear normal       Nose: Nose normal       Mouth/Throat:      Mouth: Mucous membranes are moist       Pharynx: No oropharyngeal exudate or posterior oropharyngeal erythema  Eyes:      Conjunctiva/sclera: Conjunctivae normal    Cardiovascular:      Rate and Rhythm: Normal rate and regular rhythm  Pulmonary:      Effort: Pulmonary effort is normal  No respiratory distress  Breath sounds: Normal breath sounds  Abdominal:      General: Bowel sounds are normal  There is no distension  Palpations: Abdomen is soft  Tenderness: There is no abdominal tenderness  Musculoskeletal:      Right lower leg: No edema  Left lower leg: No edema  Lymphadenopathy:      Cervical: No cervical adenopathy  Skin:     General: Skin is warm and dry  Neurological:      General: No focal deficit present  Mental Status: She is alert     Psychiatric:         Mood and Affect: Mood normal                     Raquel Rocha DO  [facility]   1/17/2022  11:45 AM

## 2022-03-10 DIAGNOSIS — E78.2 HYPERLIPIDEMIA, MIXED: ICD-10-CM

## 2022-03-10 DIAGNOSIS — Z13.220 LIPID SCREENING: ICD-10-CM

## 2022-03-10 DIAGNOSIS — M81.0 AGE-RELATED OSTEOPOROSIS WITHOUT CURRENT PATHOLOGICAL FRACTURE: ICD-10-CM

## 2022-03-10 DIAGNOSIS — Z12.83 SKIN CANCER SCREENING: ICD-10-CM

## 2022-03-10 DIAGNOSIS — I10 ESSENTIAL HYPERTENSION: ICD-10-CM

## 2022-03-10 DIAGNOSIS — E11.9 TYPE 2 DIABETES MELLITUS WITHOUT COMPLICATION, WITHOUT LONG-TERM CURRENT USE OF INSULIN (HCC): ICD-10-CM

## 2022-03-10 DIAGNOSIS — B35.1 TOENAIL FUNGUS: ICD-10-CM

## 2022-03-10 DIAGNOSIS — M43.6 TORTICOLLIS: ICD-10-CM

## 2022-03-10 DIAGNOSIS — M1A.4710 OTHER SECONDARY CHRONIC GOUT OF RIGHT ANKLE WITHOUT TOPHUS: ICD-10-CM

## 2022-03-10 DIAGNOSIS — Z00.00 MEDICARE ANNUAL WELLNESS VISIT, SUBSEQUENT: ICD-10-CM

## 2022-03-31 DIAGNOSIS — E11.9 TYPE 2 DIABETES MELLITUS WITHOUT COMPLICATION, WITHOUT LONG-TERM CURRENT USE OF INSULIN (HCC): ICD-10-CM

## 2022-03-31 RX ORDER — BLOOD SUGAR DIAGNOSTIC
STRIP MISCELLANEOUS
Qty: 100 EACH | Refills: 3 | Status: SHIPPED | OUTPATIENT
Start: 2022-03-31

## 2022-04-19 ENCOUNTER — OFFICE VISIT (OUTPATIENT)
Dept: FAMILY MEDICINE CLINIC | Facility: CLINIC | Age: 82
End: 2022-04-19
Payer: COMMERCIAL

## 2022-04-19 VITALS
TEMPERATURE: 97.2 F | HEART RATE: 83 BPM | DIASTOLIC BLOOD PRESSURE: 82 MMHG | OXYGEN SATURATION: 100 % | BODY MASS INDEX: 25.52 KG/M2 | HEIGHT: 63 IN | SYSTOLIC BLOOD PRESSURE: 172 MMHG | WEIGHT: 144 LBS

## 2022-04-19 DIAGNOSIS — I10 PRIMARY HYPERTENSION: ICD-10-CM

## 2022-04-19 DIAGNOSIS — E61.1 IRON DEFICIENCY: ICD-10-CM

## 2022-04-19 DIAGNOSIS — R80.9 TYPE 2 DIABETES MELLITUS WITH MICROALBUMINURIA, WITHOUT LONG-TERM CURRENT USE OF INSULIN (HCC): Primary | ICD-10-CM

## 2022-04-19 DIAGNOSIS — E11.29 TYPE 2 DIABETES MELLITUS WITH MICROALBUMINURIA, WITHOUT LONG-TERM CURRENT USE OF INSULIN (HCC): Primary | ICD-10-CM

## 2022-04-19 DIAGNOSIS — M1A.4710 OTHER SECONDARY CHRONIC GOUT OF RIGHT ANKLE WITHOUT TOPHUS: ICD-10-CM

## 2022-04-19 DIAGNOSIS — E78.2 HYPERLIPIDEMIA, MIXED: ICD-10-CM

## 2022-04-19 PROBLEM — M81.0 AGE-RELATED OSTEOPOROSIS WITHOUT CURRENT PATHOLOGICAL FRACTURE: Status: RESOLVED | Noted: 2020-06-08 | Resolved: 2022-04-19

## 2022-04-19 PROBLEM — B35.1 TOENAIL FUNGUS: Status: RESOLVED | Noted: 2020-06-08 | Resolved: 2022-04-19

## 2022-04-19 LAB — SL AMB POCT HEMOGLOBIN AIC: 7.5 (ref ?–6.5)

## 2022-04-19 PROCEDURE — 3077F SYST BP >= 140 MM HG: CPT | Performed by: FAMILY MEDICINE

## 2022-04-19 PROCEDURE — 1160F RVW MEDS BY RX/DR IN RCRD: CPT | Performed by: FAMILY MEDICINE

## 2022-04-19 PROCEDURE — 83036 HEMOGLOBIN GLYCOSYLATED A1C: CPT | Performed by: FAMILY MEDICINE

## 2022-04-19 PROCEDURE — 3079F DIAST BP 80-89 MM HG: CPT | Performed by: FAMILY MEDICINE

## 2022-04-19 PROCEDURE — 99214 OFFICE O/P EST MOD 30 MIN: CPT | Performed by: FAMILY MEDICINE

## 2022-04-19 NOTE — PROGRESS NOTES
Nathaly Burnett 1940 female MRN: [id number]      ASSESSMENT/PLAN  Problem List Items Addressed This Visit        Endocrine    Type 2 diabetes mellitus with microalbuminuria, without long-term current use of insulin [facility] - Primary    Relevant Orders    POCT hemoglobin A1c (Completed)    Comprehensive metabolic panel    Lipid panel    HEMOGLOBIN A1C W/ EAG ESTIMATION       Cardiovascular and Mediastinum    Hypertension    Relevant Orders    Comprehensive metabolic panel    Lipid panel       Other    Gout    Relevant Orders    Uric acid    Hyperlipidemia, mixed    Relevant Orders    Comprehensive metabolic panel    Lipid panel    Iron deficiency    Relevant Orders    CBC and differential    Iron Panel (Includes Ferritin, Iron Sat%, Iron, and TIBC)        DM: A1c stable at 7 5%, which is in good range   HTN: Above goal in office, previously well controlled  Pt's daughter to confirm medication is not missing from pt's bubble pack and they will monitor home BP daily x1 week and call with log  If persistently elevated, could consider increase in Lisinopril  HLD: Update lipids prior to next visit       No future appointments  SUBJECTIVE  CC: Diabetes      HPI:  Nathaly Burnett is a 80 y o  female who presents with her daughter for chronic follow up as detailed below  DM: Home sugars "mostly off"; denies hypo/hyperglycemic symptoms   HTN: Home BPs none   HLD: Not on statin     R shoulder -- had a fall about 1 year ago, though pain was present prior; pharmacy didn't put Osteo-BiFlex in her bubble packs -- symptoms have improved since restarting the supplement       Review of Systems   Constitutional: Negative for diaphoresis  Eyes: Negative for visual disturbance  Respiratory: Negative for cough and shortness of breath  Cardiovascular: Negative for chest pain, palpitations and leg swelling  Gastrointestinal: Negative for abdominal pain, constipation and diarrhea     Endocrine: Negative for polydipsia and polyuria  Musculoskeletal: Positive for arthralgias  Neurological: Negative for dizziness, tremors and headaches  Psychiatric/Behavioral: Negative for sleep disturbance  Historical Information   The patient history was reviewed and updated as follows:    Past Medical History:   Diagnosis Date    Diabetes mellitus ([address] )     Hypertension     Osteoporosis     pt denies on PT eval 20     Past Surgical History:   Procedure Laterality Date     SECTION       Family History   Problem Relation Age of Onset    Diabetes Mother     COPD Mother     Substance Abuse Mother       Social History   Social History     Substance and Sexual Activity   Alcohol Use Never     Social History     Substance and Sexual Activity   Drug Use Never     Social History     Tobacco Use   Smoking Status Never Smoker   Smokeless Tobacco Never Used       Medications:     Current Outpatient Medications:     acarbose (PRECOSE) 50 mg tablet, TAKE 1 TABLET BY MOUTH DAILY WITH BREAKFAST, Disp: 90 tablet, Rfl: 1    Alcohol Swabs (Pharmacist Choice Alcohol) PADS, Use as directed, Disp: , Rfl:     aspirin 81 mg chewable tablet, Chew 81 mg daily, Disp: , Rfl:     ferrous sulfate 324 (65 Fe) mg, Take 1 tablet (324 mg total) by mouth daily before breakfast, Disp: 90 tablet, Rfl: 1    glucose blood test strip, MEDICARE B, Disp: , Rfl:     Lancet Devices (Adjustable Lancing Device) MISC, MEDICARE B, Disp: , Rfl:     [employer] FINEPOINT LANCETS MISC, by Does not apply route daily, Disp: 100 each, Rfl: 3    lisinopril (ZESTRIL) 20 mg tablet, Take 1 tablet (20 mg total) by mouth daily, Disp: 90 tablet, Rfl: 1    metFORMIN (GLUCOPHAGE) 850 mg tablet, TAKE 1 TABLET BY MOUTH 2 TIMES A DAY WITH MEALS, Disp: 180 tablet, Rfl: 1    Multiple Vitamins-Minerals (PreserVision AREDS 2) CAPS, TAKE 2 CAPSULES BY MOUTH DAILY IN THE MORNING    PATIENT BRINGS STOCK FROM HOME, Disp: , Rfl:     OneTouch Verio test strip, USE TO TEST BLOOD SUGAR DAILY AS DIRECTED, Disp: 100 each, Rfl: 3    Trulicity 0 27 IE/7 3TM SOPN, INJECT 0 5 ML (0 75 MG TOTAL) UNDER THE SKIN ONCE A WEEK, Disp: , Rfl:     vitamin B-12 (VITAMIN B-12) 1,000 mcg tablet, TAKE ONE TABLET BY MOUTH DAILY IN THE MORNING  PATIENT BRINGS STOCK FROM HOME, Disp: , Rfl:   No Known Allergies    OBJECTIVE    Vitals:   Vitals:    04/19/22 1050 04/19/22 1103   BP: (!) 178/86 (!) 172/82   Pulse: 83    Temp: (!) 97 2 °F (36 2 °C)    SpO2: 100%    Weight: 65 3 kg (144 lb)    Height: 5' 3" (1 6 m)            Physical Exam               Ronni Kebede DO  [facility]   4/19/2022  11:04 AM

## 2022-04-19 NOTE — PATIENT INSTRUCTIONS
10% - bad control"> 10% - bad control,Hemoglobin A1c (HbA1c) greater than 10% indicating poor diabetic control,Haemoglobin A1c greater than 10% indicating poor diabetic control">   Diabetes Mellitus Type 2 in Adults, Ambulatory Care   GENERAL INFORMATION:   Diabetes mellitus type 2  is a disease that affects how your body uses glucose (sugar)  Insulin helps move sugar out of the blood so it can be used for energy  Normally, when the blood sugar level increases, the pancreas makes more insulin  Type 2 diabetes develops because either the body cannot make enough insulin, or it cannot use the insulin correctly  After many years, your pancreas may stop making insulin  Common symptoms include the following:   · More hunger or thirst than usual     · Frequent urination     · Weight loss without trying     · Blurred vision  Seek immediate care for the following symptoms:   · Severe abdominal pain, or pain that spreads to your back  You may also be vomiting  · Trouble staying awake or focusing    · Shaking or sweating    · Blurred or double vision    · Breath has a fruity, sweet smell    · Breathing is deep and labored, or rapid and shallow    · Heartbeat is fast and weak  Treatment for diabetes mellitus type 2  includes keeping your blood sugar at a normal level  You must eat the right foods, and exercise regularly  You may also need medicine if you cannot control your blood sugar level with nutrition and exercise  Manage diabetes mellitus type 2:   · Check your blood sugar level  You will be taught how to check a small drop of blood in a glucose monitor  Ask your healthcare provider when and how often to check during the day  Ask your healthcare provider what your blood sugar levels should be when you check them  · Keep track of carbohydrates (sugar and starchy foods)  Your blood sugar level can get too high if you eat too many carbohydrates   Your dietitian will help you plan meals and snacks that have the right amount of carbohydrates  · Eat low-fat foods  Some examples are skinless chicken and low-fat milk  · Eat less sodium (salt)  Some examples of high-sodium foods to limit are soy sauce, potato chips, and soup  Do not add salt to food you cook  Limit your use of table salt  · Eat high-fiber foods  Foods that are a good source of fiber include vegetables, whole grain bread, and beans  · Limit alcohol  Alcohol affects your blood sugar level and can make it harder to manage your diabetes  Women should limit alcohol to 1 drink a day  Men should limit alcohol to 2 drinks a day  A drink of alcohol is 12 ounces of beer, 5 ounces of wine, or 1½ ounces of liquor  · Get regular exercise  Exercise can help keep your blood sugar level steady, decrease your risk of heart disease, and help you lose weight  Exercise for at least 30 minutes, 5 days a week  Include muscle strengthening activities 2 days each week  Work with your healthcare provider to create an exercise plan  · Check your feet each day  for injuries or open sores  Ask your healthcare provider for activities you can do if you have an open sore  · Quit smoking  If you smoke, it is never too late to quit  Smoking can worsen the problems that may occur with diabetes  Ask your healthcare provider for information about how to stop smoking if you are having trouble quitting  · Ask about your weight:  Ask healthcare providers if you need to lose weight, and how much to lose  Ask them to help you with a weight loss program  Even a 10 to 15 pound weight loss can help you manage your blood sugar level  · Carry medical alert identification  Wear medical alert jewelry or carry a card that says you have diabetes  Ask your healthcare provider where to get these items  · Ask about vaccines  Diabetes puts you at risk of serious illness if you get the flu, pneumonia, or hepatitis   Ask your healthcare provider if you should get a flu, pneumonia, or hepatitis B vaccine, and when to get the vaccine  Follow up with your healthcare provider as directed:  Write down your questions so you remember to ask them during your visits  CARE AGREEMENT:   You have the right to help plan your care  Learn about your health condition and how it may be treated  Discuss treatment options with your caregivers to decide what care you want to receive  You always have the right to refuse treatment  The above information is an  only  It is not intended as medical advice for individual conditions or treatments  Talk to your doctor, nurse or pharmacist before following any medical regimen to see if it is safe and effective for you  © 2014 [address] is for End User's use only and may not be sold, redistributed or otherwise used for commercial purposes  All illustrations and images included in CareNotes® are the copyrighted property of A D A M [employer] Inc  or Sivakumar Saeed

## 2022-07-14 ENCOUNTER — RA CDI HCC (OUTPATIENT)
Dept: OTHER | Facility: HOSPITAL | Age: 82
End: 2022-07-14

## 2022-07-14 NOTE — PROGRESS NOTES
[facility]  coding opportunities       Chart reviewed, no opportunity found:   [address]        Patients Insurance     Medicare Insurance: [employer] Advantage

## 2022-07-20 ENCOUNTER — APPOINTMENT (OUTPATIENT)
Dept: LAB | Facility: CLINIC | Age: 82
End: 2022-07-20
Payer: COMMERCIAL

## 2022-07-20 ENCOUNTER — OFFICE VISIT (OUTPATIENT)
Dept: FAMILY MEDICINE CLINIC | Facility: CLINIC | Age: 82
End: 2022-07-20
Payer: COMMERCIAL

## 2022-07-20 VITALS
HEART RATE: 102 BPM | TEMPERATURE: 97.9 F | OXYGEN SATURATION: 97 % | DIASTOLIC BLOOD PRESSURE: 82 MMHG | SYSTOLIC BLOOD PRESSURE: 144 MMHG | WEIGHT: 144 LBS | BODY MASS INDEX: 25.52 KG/M2 | HEIGHT: 63 IN

## 2022-07-20 DIAGNOSIS — E78.2 HYPERLIPIDEMIA, MIXED: ICD-10-CM

## 2022-07-20 DIAGNOSIS — I10 PRIMARY HYPERTENSION: ICD-10-CM

## 2022-07-20 DIAGNOSIS — Z13.31 SCREENING FOR DEPRESSION: ICD-10-CM

## 2022-07-20 DIAGNOSIS — M1A.4710 OTHER SECONDARY CHRONIC GOUT OF RIGHT ANKLE WITHOUT TOPHUS: ICD-10-CM

## 2022-07-20 DIAGNOSIS — Z00.00 MEDICARE ANNUAL WELLNESS VISIT, SUBSEQUENT: ICD-10-CM

## 2022-07-20 DIAGNOSIS — R80.9 TYPE 2 DIABETES MELLITUS WITH MICROALBUMINURIA, WITHOUT LONG-TERM CURRENT USE OF INSULIN (HCC): Primary | ICD-10-CM

## 2022-07-20 DIAGNOSIS — E61.1 IRON DEFICIENCY: ICD-10-CM

## 2022-07-20 DIAGNOSIS — R80.9 TYPE 2 DIABETES MELLITUS WITH MICROALBUMINURIA, WITHOUT LONG-TERM CURRENT USE OF INSULIN (HCC): ICD-10-CM

## 2022-07-20 DIAGNOSIS — E11.29 TYPE 2 DIABETES MELLITUS WITH MICROALBUMINURIA, WITHOUT LONG-TERM CURRENT USE OF INSULIN (HCC): Primary | ICD-10-CM

## 2022-07-20 DIAGNOSIS — E11.29 TYPE 2 DIABETES MELLITUS WITH MICROALBUMINURIA, WITHOUT LONG-TERM CURRENT USE OF INSULIN (HCC): ICD-10-CM

## 2022-07-20 LAB
ALBUMIN SERPL BCP-MCNC: 3.4 G/DL (ref 3.5–5)
ALP SERPL-CCNC: 56 U/L (ref 46–116)
ALT SERPL W P-5'-P-CCNC: 22 U/L (ref 12–78)
ANION GAP SERPL CALCULATED.3IONS-SCNC: 8 MMOL/L (ref 4–13)
AST SERPL W P-5'-P-CCNC: 15 U/L (ref 5–45)
BASOPHILS # BLD AUTO: 0.03 THOUSANDS/ΜL (ref 0–0.1)
BASOPHILS NFR BLD AUTO: 1 % (ref 0–1)
BILIRUB SERPL-MCNC: 0.39 MG/DL (ref 0.2–1)
BUN SERPL-MCNC: 23 MG/DL (ref 5–25)
CALCIUM ALBUM COR SERPL-MCNC: 10.2 MG/DL (ref 8.3–10.1)
CALCIUM SERPL-MCNC: 9.7 MG/DL (ref 8.3–10.1)
CHLORIDE SERPL-SCNC: 105 MMOL/L (ref 96–108)
CHOLEST SERPL-MCNC: 165 MG/DL
CO2 SERPL-SCNC: 28 MMOL/L (ref 21–32)
CREAT SERPL-MCNC: 0.8 MG/DL (ref 0.6–1.3)
EOSINOPHIL # BLD AUTO: 0.13 THOUSAND/ΜL (ref 0–0.61)
EOSINOPHIL NFR BLD AUTO: 3 % (ref 0–6)
ERYTHROCYTE [DISTWIDTH] IN BLOOD BY AUTOMATED COUNT: 14.1 % (ref 11.6–15.1)
EST. AVERAGE GLUCOSE BLD GHB EST-MCNC: 180 MG/DL
FERRITIN SERPL-MCNC: 31 NG/ML (ref 8–388)
GFR SERPL CREATININE-BSD FRML MDRD: 70 ML/MIN/1.73SQ M
GLUCOSE P FAST SERPL-MCNC: 154 MG/DL (ref 65–99)
HBA1C MFR BLD: 7.9 %
HCT VFR BLD AUTO: 40 % (ref 34.8–46.1)
HDLC SERPL-MCNC: 58 MG/DL
HGB BLD-MCNC: 12.6 G/DL (ref 11.5–15.4)
IMM GRANULOCYTES # BLD AUTO: 0.01 THOUSAND/UL (ref 0–0.2)
IMM GRANULOCYTES NFR BLD AUTO: 0 % (ref 0–2)
IRON SATN MFR SERPL: 20 % (ref 15–50)
IRON SERPL-MCNC: 55 UG/DL (ref 50–170)
LDLC SERPL CALC-MCNC: 89 MG/DL (ref 0–100)
LYMPHOCYTES # BLD AUTO: 2.15 THOUSANDS/ΜL (ref 0.6–4.47)
LYMPHOCYTES NFR BLD AUTO: 44 % (ref 14–44)
MCH RBC QN AUTO: 29.3 PG (ref 26.8–34.3)
MCHC RBC AUTO-ENTMCNC: 31.5 G/DL (ref 31.4–37.4)
MCV RBC AUTO: 93 FL (ref 82–98)
MONOCYTES # BLD AUTO: 0.41 THOUSAND/ΜL (ref 0.17–1.22)
MONOCYTES NFR BLD AUTO: 9 % (ref 4–12)
NEUTROPHILS # BLD AUTO: 2.04 THOUSANDS/ΜL (ref 1.85–7.62)
NEUTS SEG NFR BLD AUTO: 43 % (ref 43–75)
NONHDLC SERPL-MCNC: 107 MG/DL
NRBC BLD AUTO-RTO: 0 /100 WBCS
PLATELET # BLD AUTO: 243 THOUSANDS/UL (ref 149–390)
PMV BLD AUTO: 9.6 FL (ref 8.9–12.7)
POTASSIUM SERPL-SCNC: 4 MMOL/L (ref 3.5–5.3)
PROT SERPL-MCNC: 7.3 G/DL (ref 6.4–8.4)
RBC # BLD AUTO: 4.3 MILLION/UL (ref 3.81–5.12)
SODIUM SERPL-SCNC: 141 MMOL/L (ref 135–147)
TIBC SERPL-MCNC: 281 UG/DL (ref 250–450)
TRIGL SERPL-MCNC: 89 MG/DL
URATE SERPL-MCNC: 3.9 MG/DL (ref 2–7.5)
WBC # BLD AUTO: 4.77 THOUSAND/UL (ref 4.31–10.16)

## 2022-07-20 PROCEDURE — 80061 LIPID PANEL: CPT

## 2022-07-20 PROCEDURE — 80053 COMPREHEN METABOLIC PANEL: CPT

## 2022-07-20 PROCEDURE — 1125F AMNT PAIN NOTED PAIN PRSNT: CPT | Performed by: FAMILY MEDICINE

## 2022-07-20 PROCEDURE — 1090F PRES/ABSN URINE INCON ASSESS: CPT | Performed by: FAMILY MEDICINE

## 2022-07-20 PROCEDURE — 1170F FXNL STATUS ASSESSED: CPT | Performed by: FAMILY MEDICINE

## 2022-07-20 PROCEDURE — 83550 IRON BINDING TEST: CPT

## 2022-07-20 PROCEDURE — 3725F SCREEN DEPRESSION PERFORMED: CPT | Performed by: FAMILY MEDICINE

## 2022-07-20 PROCEDURE — G0439 PPPS, SUBSEQ VISIT: HCPCS | Performed by: FAMILY MEDICINE

## 2022-07-20 PROCEDURE — 1160F RVW MEDS BY RX/DR IN RCRD: CPT | Performed by: FAMILY MEDICINE

## 2022-07-20 PROCEDURE — 99214 OFFICE O/P EST MOD 30 MIN: CPT | Performed by: FAMILY MEDICINE

## 2022-07-20 PROCEDURE — 83540 ASSAY OF IRON: CPT

## 2022-07-20 PROCEDURE — G0444 DEPRESSION SCREEN ANNUAL: HCPCS | Performed by: FAMILY MEDICINE

## 2022-07-20 PROCEDURE — 82728 ASSAY OF FERRITIN: CPT

## 2022-07-20 PROCEDURE — 83036 HEMOGLOBIN GLYCOSYLATED A1C: CPT

## 2022-07-20 PROCEDURE — 3079F DIAST BP 80-89 MM HG: CPT | Performed by: FAMILY MEDICINE

## 2022-07-20 PROCEDURE — 3288F FALL RISK ASSESSMENT DOCD: CPT | Performed by: FAMILY MEDICINE

## 2022-07-20 PROCEDURE — 85025 COMPLETE CBC W/AUTO DIFF WBC: CPT

## 2022-07-20 PROCEDURE — 92250 FUNDUS PHOTOGRAPHY W/I&R: CPT | Performed by: FAMILY MEDICINE

## 2022-07-20 PROCEDURE — 84550 ASSAY OF BLOOD/URIC ACID: CPT

## 2022-07-20 PROCEDURE — 3077F SYST BP >= 140 MM HG: CPT | Performed by: FAMILY MEDICINE

## 2022-07-20 PROCEDURE — 36415 COLL VENOUS BLD VENIPUNCTURE: CPT

## 2022-07-20 NOTE — PROGRESS NOTES
Assessment and Plan:     Problem List Items Addressed This Visit        Endocrine    Type 2 diabetes mellitus with microalbuminuria, without long-term current use of insulin ([facility] [address] ) - Primary    Relevant Orders    IRIS Diabetic eye exam       Cardiovascular and Mediastinum    Hypertension       Other    Hyperlipidemia, mixed    Iron deficiency      Other Visit Diagnoses     Medicare annual wellness visit, subsequent        Screening for depression            DM: A1c pending, will f/u once results available  HTN: Borderline in office, within goal at home, continue current regimen   HLD: Continue lifestyle modifications         Depression Screening and Follow-up Plan: Patient was screened for depression during today's encounter  They screened negative with a PHQ-2 score of 0  Preventive health issues were discussed with patient, and age appropriate screening tests were ordered as noted in patient's After Visit Summary  Personalized health advice and appropriate referrals for health education or preventive services given if needed, as noted in patient's After Visit Summary  History of Present Illness:     Patient presents with her daughter for chronic follow up and a Medicare Wellness Visit    DM: Home sugars "sometimes high and sometimes low"; denies hypo/hyperglycemic symptoms   HTN: Home BPs "good"   HLD: Not on statin   CATY: On supplement     Labs pending at time of visit      Patient Care Team:  Irma Ibarra DO as PCP - General (Family Medicine)     Review of Systems:     Review of Systems   Constitutional: Negative for diaphoresis  Eyes: Negative for visual disturbance  Respiratory: Negative for cough and shortness of breath  Cardiovascular: Negative for chest pain, palpitations and leg swelling  Gastrointestinal: Negative for abdominal pain, constipation and diarrhea  Endocrine: Negative for polydipsia and polyuria  Neurological: Negative for dizziness, tremors and headaches  Psychiatric/Behavioral: Negative for sleep disturbance          Problem List:     Patient Active Problem List   Diagnosis    Type 2 diabetes mellitus with microalbuminuria, without long-term current use of insulin (HCC)    Hypertension    Hyperlipidemia, mixed    Gout    Torticollis    Chronic neck pain    Hypercalcemia    Iron deficiency      Past Medical and Surgical History:     Past Medical History:   Diagnosis Date    Diabetes mellitus ([facility] [address] )     Hypertension     Osteoporosis     pt denies on PT eval 20     Past Surgical History:   Procedure Laterality Date     SECTION        Family History:     Family History   Problem Relation Age of Onset    Diabetes Mother     COPD Mother     Substance Abuse Mother       Social History:     Social History     Socioeconomic History    Marital status: Unknown     Spouse name: None    Number of children: None    Years of education: None    Highest education level: None   Occupational History    Occupation: retired from [employer]  Use    Smoking status: Never Smoker    Smokeless tobacco: Never Used   Vaping Use    Vaping Use: Never used   Substance and Sexual Activity    Alcohol use: Never    Drug use: Never    Sexual activity: Not Currently   Other Topics Concern    None   Social History Narrative    None     Social Determinants of Health     Financial Resource Strain: Not on file   Food Insecurity: Not on file   Transportation Needs: Not on file   Physical Activity: Not on file   Stress: Not on file   Social Connections: Not on file   Intimate Partner Violence: Not on file   Housing Stability: Not on file      Medications and Allergies:     Current Outpatient Medications   Medication Sig Dispense Refill    acarbose (PRECOSE) 50 mg tablet TAKE 1 TABLET BY MOUTH DAILY WITH BREAKFAST 90 tablet 1    Alcohol Swabs (Pharmacist Choice Alcohol) PADS Use as directed      aspirin 81 mg chewable tablet Chew 81 mg daily      ferrous sulfate 324 (65 Fe) mg TAKE 1 TABLET (324 MG TOTAL) BY MOUTH DAILY BEFORE BREAKFAST 90 tablet 1    glucose blood test strip MEDICARE B      Lancet Devices (Adjustable Lancing Device) MISC MEDICARE B      LIFESCAN FINEPOINT LANCETS MISC by Does not apply route daily 100 each 3    lisinopril (ZESTRIL) 20 mg tablet Take 1 tablet (20 mg total) by mouth daily 90 tablet 1    metFORMIN (GLUCOPHAGE) 850 mg tablet TAKE 1 TABLET BY MOUTH 2 TIMES A DAY WITH MEALS 180 tablet 1    Multiple Vitamins-Minerals (PreserVision AREDS 2) CAPS TAKE 2 CAPSULES BY MOUTH DAILY IN THE MORNING  PATIENT BRINGS STOCK FROM HOME      OneTouch Verio test strip USE TO TEST BLOOD SUGAR DAILY AS DIRECTED 100 each 3    vitamin B-12 (VITAMIN B-12) 1,000 mcg tablet TAKE ONE TABLET BY MOUTH DAILY IN THE MORNING  PATIENT BRINGS STOCK FROM HOME       No current facility-administered medications for this visit  No Known Allergies   Immunizations: There is no immunization history on file for this patient  Health Maintenance:         Topic Date Due    Hepatitis C Screening  Never done         Topic Date Due    COVID-19 Vaccine (1) Never done    Pneumococcal Vaccine: 65+ Years (1 - PCV) Never done    Influenza Vaccine (1) 09/01/2022      Medicare Screening Tests and Risk Assessments:     Kurt Chavez is here for her Subsequent Wellness visit  Health Risk Assessment:   Patient rates overall health as excellent  Patient feels that their physical health rating is same  Patient is very satisfied with their life  Eyesight was rated as same  Hearing was rated as same  Patient feels that their emotional and mental health rating is same  Patients states they are never, rarely angry  Patient states they are never, rarely unusually tired/fatigued  Pain experienced in the last 7 days has been none  Patient states that she has experienced no weight loss or gain in last 6 months  Depression Screening:   PHQ-2 Score: 0      Fall Risk Screening:    In the past year, patient has experienced: no history of falling in past year      Urinary Incontinence Screening:   Patient has not leaked urine accidently in the last six months  Home Safety:  Patient has trouble with stairs inside or outside of their home  Patient has working smoke alarms and has working carbon monoxide detector  Home safety hazards include: none  Nutrition:   Current diet is Diabetic  Medications:   Patient is not currently taking any over-the-counter supplements  Patient is able to manage medications  Activities of Daily Living (ADLs)/Instrumental Activities of Daily Living (IADLs):   Walk and transfer into and out of bed and chair?: Yes  Dress and groom yourself?: Yes    Bathe or shower yourself?: Yes    Feed yourself?  Yes  Do your laundry/housekeeping?: Yes  Manage your money, pay your bills and track your expenses?: Yes  Make your own meals?: Yes    Do your own shopping?: Yes    Durable Medical Equipment Suppliers  N/A    Previous Hospitalizations:   Any hospitalizations or ED visits within the last 12 months?: No      Advance Care Planning:   Living will: No    Five wishes given: Yes      Cognitive Screening:   Provider or family/friend/caregiver concerned regarding cognition?: No    PREVENTIVE SCREENINGS      Cardiovascular Screening:    General: Screening Not Indicated and History Lipid Disorder      Diabetes Screening:     General: Screening Not Indicated and History Diabetes      Colorectal Cancer Screening:     General: Screening Not Indicated      Breast Cancer Screening:     General: Screening Not Indicated      Cervical Cancer Screening:    General: Screening Not Indicated      Osteoporosis Screening:    General: Screening Not Indicated and History Osteoporosis      Abdominal Aortic Aneurysm (AAA) Screening:        General: Screening Not Indicated      Lung Cancer Screening:     General: Screening Not Indicated      Hepatitis C Screening:    General: Screening Not Indicated    Screening, Brief Intervention, and Referral to Treatment (SBIRT)    Screening  Typical number of drinks in a day: 0    Single Item Drug Screening:  How often have you used an illegal drug (including marijuana) or a prescription medication for non-medical reasons in the past year? never    Single Item Drug Screen Score: 0  Interpretation: Negative screen for possible drug use disorder    No exam data present     Physical Exam:     /82 (BP Location: Left arm, Patient Position: Sitting)   Pulse 102   Temp 97 9 °F (36 6 °C) (Temporal)   Ht 5' 3" (1 6 m)   Wt 65 3 kg (144 lb)   SpO2 97%   BMI 25 51 kg/m²     Physical Exam  Vitals and nursing note reviewed  Constitutional:       General: She is not in acute distress  Appearance: She is well-developed  HENT:      Head: Normocephalic and atraumatic  Right Ear: Tympanic membrane, ear canal and external ear normal       Left Ear: Tympanic membrane, ear canal and external ear normal       Nose: Nose normal       Mouth/Throat:      Mouth: Mucous membranes are moist    Eyes:      Conjunctiva/sclera: Conjunctivae normal    Neck:      Thyroid: No thyromegaly  Cardiovascular:      Rate and Rhythm: Normal rate and regular rhythm  Pulses: no weak pulses          Dorsalis pedis pulses are 2+ on the right side and 2+ on the left side  Pulmonary:      Effort: Pulmonary effort is normal  No respiratory distress  Breath sounds: Normal breath sounds  Abdominal:      General: Bowel sounds are normal  There is no distension  Palpations: Abdomen is soft  Tenderness: There is no abdominal tenderness  Musculoskeletal:         General: Normal range of motion  Feet:      Right foot:      Skin integrity: Callus and dry skin present  Left foot:      Skin integrity: Callus and dry skin present  Lymphadenopathy:      Cervical: No cervical adenopathy  Skin:     General: Skin is warm and dry     Neurological:      General: No focal deficit present  Mental Status: She is alert  Psychiatric:         Mood and Affect: Mood normal         Patient's shoes and socks removed  Right Foot/Ankle   Right Foot Inspection  Skin Exam: dry skin, callus and callus  Toe Exam:  no right toe deformity    Sensory   Vibration: intact  Monofilament testing: intact    Vascular  Capillary refills: < 3 seconds  The right DP pulse is 2+  Left Foot/Ankle  Left Foot Inspection  Skin Exam: dry skin and callus  Toe Exam: No left toe deformity  Sensory   Vibration: intact  Monofilament testing: intact    Vascular  Capillary refills: < 3 seconds  The left DP pulse is 2+       Assign Risk Category  No deformity present  No loss of protective sensation  No weak pulses  Risk: 0        Raquel Rocha DO

## 2022-07-25 LAB
LEFT EYE DIABETIC RETINOPATHY: NORMAL
LEFT EYE IMAGE QUALITY: NORMAL
LEFT EYE MACULAR EDEMA: NORMAL
LEFT EYE OTHER RETINOPATHY: NORMAL
RIGHT EYE DIABETIC RETINOPATHY: NORMAL
RIGHT EYE MACULAR EDEMA: NORMAL
RIGHT EYE OTHER RETINOPATHY: NORMAL
SEVERITY (EYE EXAM): NORMAL

## 2022-07-29 DIAGNOSIS — E78.2 HYPERLIPIDEMIA, MIXED: ICD-10-CM

## 2022-07-29 DIAGNOSIS — I10 ESSENTIAL HYPERTENSION: ICD-10-CM

## 2022-07-29 DIAGNOSIS — M81.0 AGE-RELATED OSTEOPOROSIS WITHOUT CURRENT PATHOLOGICAL FRACTURE: ICD-10-CM

## 2022-07-29 DIAGNOSIS — Z12.83 SKIN CANCER SCREENING: ICD-10-CM

## 2022-07-29 DIAGNOSIS — Z13.220 LIPID SCREENING: ICD-10-CM

## 2022-07-29 DIAGNOSIS — M43.6 TORTICOLLIS: ICD-10-CM

## 2022-07-29 DIAGNOSIS — E11.9 TYPE 2 DIABETES MELLITUS WITHOUT COMPLICATION, WITHOUT LONG-TERM CURRENT USE OF INSULIN (HCC): ICD-10-CM

## 2022-07-29 DIAGNOSIS — Z00.00 MEDICARE ANNUAL WELLNESS VISIT, SUBSEQUENT: ICD-10-CM

## 2022-07-29 DIAGNOSIS — M1A.4710 OTHER SECONDARY CHRONIC GOUT OF RIGHT ANKLE WITHOUT TOPHUS: ICD-10-CM

## 2022-07-29 DIAGNOSIS — B35.1 TOENAIL FUNGUS: ICD-10-CM

## 2022-07-29 RX ORDER — ACARBOSE 50 MG/1
TABLET ORAL
Qty: 90 TABLET | Refills: 1 | Status: SHIPPED | OUTPATIENT
Start: 2022-07-29

## 2022-08-23 DIAGNOSIS — I10 ESSENTIAL HYPERTENSION: ICD-10-CM

## 2022-08-23 RX ORDER — LISINOPRIL 20 MG/1
20 TABLET ORAL DAILY
Qty: 90 TABLET | Refills: 1 | Status: SHIPPED | OUTPATIENT
Start: 2022-08-23

## 2022-10-17 DIAGNOSIS — E78.2 HYPERLIPIDEMIA, MIXED: ICD-10-CM

## 2022-10-17 DIAGNOSIS — E11.29 TYPE 2 DIABETES MELLITUS WITH MICROALBUMINURIA, WITHOUT LONG-TERM CURRENT USE OF INSULIN: Primary | ICD-10-CM

## 2022-10-17 DIAGNOSIS — R80.9 TYPE 2 DIABETES MELLITUS WITH MICROALBUMINURIA, WITHOUT LONG-TERM CURRENT USE OF INSULIN: Primary | ICD-10-CM

## 2022-10-19 ENCOUNTER — APPOINTMENT (OUTPATIENT)
Dept: LAB | Facility: CLINIC | Age: 82
End: 2022-10-19
Payer: COMMERCIAL

## 2022-10-19 DIAGNOSIS — E78.2 HYPERLIPIDEMIA, MIXED: ICD-10-CM

## 2022-10-19 DIAGNOSIS — R80.9 TYPE 2 DIABETES MELLITUS WITH MICROALBUMINURIA, WITHOUT LONG-TERM CURRENT USE OF INSULIN (HCC): ICD-10-CM

## 2022-10-19 DIAGNOSIS — E11.29 TYPE 2 DIABETES MELLITUS WITH MICROALBUMINURIA, WITHOUT LONG-TERM CURRENT USE OF INSULIN (HCC): ICD-10-CM

## 2022-10-19 LAB
ALBUMIN SERPL BCP-MCNC: 3.2 G/DL (ref 3.5–5)
ALP SERPL-CCNC: 60 U/L (ref 46–116)
ALT SERPL W P-5'-P-CCNC: 20 U/L (ref 12–78)
ANION GAP SERPL CALCULATED.3IONS-SCNC: 2 MMOL/L (ref 4–13)
AST SERPL W P-5'-P-CCNC: 10 U/L (ref 5–45)
BASOPHILS # BLD AUTO: 0.03 THOUSANDS/ÂΜL (ref 0–0.1)
BASOPHILS NFR BLD AUTO: 1 % (ref 0–1)
BILIRUB SERPL-MCNC: 0.36 MG/DL (ref 0.2–1)
BUN SERPL-MCNC: 25 MG/DL (ref 5–25)
CALCIUM ALBUM COR SERPL-MCNC: 10.8 MG/DL (ref 8.3–10.1)
CALCIUM SERPL-MCNC: 10.2 MG/DL (ref 8.3–10.1)
CHLORIDE SERPL-SCNC: 106 MMOL/L (ref 96–108)
CHOLEST SERPL-MCNC: 133 MG/DL
CO2 SERPL-SCNC: 31 MMOL/L (ref 21–32)
CREAT SERPL-MCNC: 0.89 MG/DL (ref 0.6–1.3)
CREAT UR-MCNC: 197 MG/DL
EOSINOPHIL # BLD AUTO: 0.18 THOUSAND/ÂΜL (ref 0–0.61)
EOSINOPHIL NFR BLD AUTO: 4 % (ref 0–6)
ERYTHROCYTE [DISTWIDTH] IN BLOOD BY AUTOMATED COUNT: 13.4 % (ref 11.6–15.1)
EST. AVERAGE GLUCOSE BLD GHB EST-MCNC: 177 MG/DL
GFR SERPL CREATININE-BSD FRML MDRD: 61 ML/MIN/1.73SQ M
GLUCOSE P FAST SERPL-MCNC: 190 MG/DL (ref 65–99)
HBA1C MFR BLD: 7.8 %
HCT VFR BLD AUTO: 38.4 % (ref 34.8–46.1)
HDLC SERPL-MCNC: 60 MG/DL
HGB BLD-MCNC: 11.6 G/DL (ref 11.5–15.4)
IMM GRANULOCYTES # BLD AUTO: 0.01 THOUSAND/UL (ref 0–0.2)
IMM GRANULOCYTES NFR BLD AUTO: 0 % (ref 0–2)
LDLC SERPL CALC-MCNC: 60 MG/DL (ref 0–100)
LYMPHOCYTES # BLD AUTO: 2.16 THOUSANDS/ÂΜL (ref 0.6–4.47)
LYMPHOCYTES NFR BLD AUTO: 47 % (ref 14–44)
MCH RBC QN AUTO: 28.6 PG (ref 26.8–34.3)
MCHC RBC AUTO-ENTMCNC: 30.2 G/DL (ref 31.4–37.4)
MCV RBC AUTO: 95 FL (ref 82–98)
MICROALBUMIN UR-MCNC: 131 MG/L (ref 0–20)
MICROALBUMIN/CREAT 24H UR: 66 MG/G CREATININE (ref 0–30)
MONOCYTES # BLD AUTO: 0.35 THOUSAND/ÂΜL (ref 0.17–1.22)
MONOCYTES NFR BLD AUTO: 8 % (ref 4–12)
NEUTROPHILS # BLD AUTO: 1.8 THOUSANDS/ÂΜL (ref 1.85–7.62)
NEUTS SEG NFR BLD AUTO: 40 % (ref 43–75)
NONHDLC SERPL-MCNC: 73 MG/DL
NRBC BLD AUTO-RTO: 0 /100 WBCS
PLATELET # BLD AUTO: 290 THOUSANDS/UL (ref 149–390)
PMV BLD AUTO: 9.4 FL (ref 8.9–12.7)
POTASSIUM SERPL-SCNC: 4.1 MMOL/L (ref 3.5–5.3)
PROT SERPL-MCNC: 7.1 G/DL (ref 6.4–8.4)
RBC # BLD AUTO: 4.05 MILLION/UL (ref 3.81–5.12)
SODIUM SERPL-SCNC: 139 MMOL/L (ref 135–147)
TRIGL SERPL-MCNC: 67 MG/DL
WBC # BLD AUTO: 4.53 THOUSAND/UL (ref 4.31–10.16)

## 2022-10-19 PROCEDURE — 82043 UR ALBUMIN QUANTITATIVE: CPT

## 2022-10-19 PROCEDURE — 80061 LIPID PANEL: CPT

## 2022-10-19 PROCEDURE — 85025 COMPLETE CBC W/AUTO DIFF WBC: CPT

## 2022-10-19 PROCEDURE — 80053 COMPREHEN METABOLIC PANEL: CPT

## 2022-10-19 PROCEDURE — 82570 ASSAY OF URINE CREATININE: CPT

## 2022-10-19 PROCEDURE — 36415 COLL VENOUS BLD VENIPUNCTURE: CPT

## 2022-10-19 PROCEDURE — 83036 HEMOGLOBIN GLYCOSYLATED A1C: CPT

## 2022-10-23 NOTE — PROGRESS NOTES
Emelia Schwartz 1940 female MRN: [id number]      ASSESSMENT/PLAN  Problem List Items Addressed This Visit        Endocrine    Type 2 diabetes mellitus with microalbuminuria, without long-term current use of insulin ([facility] [address] ) - Primary       Cardiovascular and Mediastinum    Hypertension       Other    Gout    Relevant Medications    colchicine (COLCRYS) 0 6 mg tablet    Hypercalcemia    Relevant Orders    Calcium    PTH, intact    Phosphorus    Vitamin D 25 hydroxy    Hyperlipidemia, mixed      Other Visit Diagnoses     Encounter for immunization        Relevant Orders    influenza vaccine, high-dose, PF 0 7 mL (FLUZONE HIGH-DOSE) (Completed)        DM: A1c somewhat improved, though still a bit above goal -- discussed increasing medication regimen, pt defers will try to adjust diet   HTN: Within goal   HLD: Well controlled   Gout: Refill Colchicine   Elevated Ca -- recheck, along with PTH, Vit D, Phos     Defers COVID vaccine     Future Appointments   Date Time Provider Beatriz Duran   10/24/2022 11:20 AM JORGE ROTHMAN [facility] CHA[facility]   1/24/2023 10:40 AM DO ARABELLA Ryan  [facility]          SUBJECTIVE  CC: Diabetes and Lab review      HPI:  Emelia Schwartz is a [de-identified] y o  female who presents for chronic follow up as below  DM: Microalbumin/Cr ratio (+), A1c 7 8% (down from 7 9); Home sugars sometimes 210, sometimes lower   HTN: Home BPs "about average"   HLD: Lipids: Total 133, LDL 60, HDL 60, TG 67; LFTs WNL    Cr 0 89/GFR 61 (decreased)   Ca 10 8  CBC benign     Needs refill on Colchicine -- flares up in ankles, elbow       Review of Systems   Constitutional: Negative for appetite change and diaphoresis  Eyes: Negative for visual disturbance  Respiratory: Negative for cough and shortness of breath  Cardiovascular: Negative for chest pain, palpitations and leg swelling  Gastrointestinal: Negative for abdominal pain, constipation and diarrhea     Endocrine: Negative for polydipsia and polyuria  Neurological: Negative for dizziness, tremors and headaches  Psychiatric/Behavioral: Negative for sleep disturbance         Historical Information   The patient history was reviewed and updated as follows:    Past Medical History:   Diagnosis Date   • Diabetes mellitus ([address] )    • Hypertension    • Osteoporosis     pt denies on PT eval 20     Past Surgical History:   Procedure Laterality Date   •  SECTION       Family History   Problem Relation Age of Onset   • Diabetes Mother    • COPD Mother    • Substance Abuse Mother       Social History   Social History     Substance and Sexual Activity   Alcohol Use Never     Social History     Substance and Sexual Activity   Drug Use Never     Social History     Tobacco Use   Smoking Status Never Smoker   Smokeless Tobacco Never Used       Medications:     Current Outpatient Medications:   •  colchicine (COLCRYS) 0 6 mg tablet, Take 2 tablets by mouth, then 1 hour later take 1 table by mouth as needed for acute gout flare, Disp: 15 tablet, Rfl: 5  •  acarbose (PRECOSE) 50 mg tablet, TAKE 1 TABLET BY MOUTH DAILY WITH BREAKFAST, Disp: 90 tablet, Rfl: 1  •  Alcohol Swabs (Pharmacist Choice Alcohol) PADS, Use as directed, Disp: , Rfl:   •  aspirin 81 mg chewable tablet, Chew 81 mg daily, Disp: , Rfl:   •  ferrous sulfate 324 (65 Fe) mg, TAKE 1 TABLET (324 MG TOTAL) BY MOUTH DAILY BEFORE BREAKFAST, Disp: 90 tablet, Rfl: 1  •  glucose blood test strip, MEDICARE B, Disp: , Rfl:   •  Lancet Devices (Adjustable Lancing Device) MISC, MEDICARE B, Disp: , Rfl:   •  LIFESCAN FINEPOINT LANCETS MISC, by Does not apply route daily, Disp: 100 each, Rfl: 3  •  lisinopril (ZESTRIL) 20 mg tablet, TAKE 1 TABLET (20 MG TOTAL) BY MOUTH DAILY, Disp: 90 tablet, Rfl: 1  •  metFORMIN (GLUCOPHAGE) 850 mg tablet, TAKE 1 TABLET BY MOUTH 2 TIMES A DAY WITH MEALS, Disp: 180 tablet, Rfl: 1  •  Multiple Vitamins-Minerals (PreserVision AREDS 2) CAPS, TAKE 2 CAPSULES BY MOUTH DAILY IN THE MORNING  PATIENT BRINGS STOCK FROM HOME, Disp: , Rfl:   •  OneTouch Verio test strip, USE TO TEST BLOOD SUGAR DAILY AS DIRECTED, Disp: 100 each, Rfl: 3  •  vitamin B-12 (VITAMIN B-12) 1,000 mcg tablet, TAKE ONE TABLET BY MOUTH DAILY IN THE MORNING  PATIENT BRINGS STOCK FROM HOME, Disp: , Rfl:   No Known Allergies    OBJECTIVE    Vitals:   Vitals:    10/24/22 1041   BP: 109/61   Pulse: 75   Temp: 98 5 °F (36 9 °C)   SpO2: 94%   Weight: 66 2 kg (146 lb)   Height: 5' 3" (1 6 m)           Physical Exam  Vitals and nursing note reviewed  Constitutional:       General: She is not in acute distress  Appearance: Normal appearance  HENT:      Head: Normocephalic and atraumatic  Right Ear: Tympanic membrane, ear canal and external ear normal       Left Ear: Tympanic membrane, ear canal and external ear normal       Nose: Nose normal       Mouth/Throat:      Mouth: Mucous membranes are moist       Pharynx: No oropharyngeal exudate or posterior oropharyngeal erythema  Eyes:      Conjunctiva/sclera: Conjunctivae normal    Cardiovascular:      Rate and Rhythm: Normal rate and regular rhythm  Pulmonary:      Effort: Pulmonary effort is normal  No respiratory distress  Breath sounds: Normal breath sounds  Abdominal:      General: Bowel sounds are normal  There is no distension  Palpations: Abdomen is soft  Tenderness: There is no abdominal tenderness  Musculoskeletal:      Right lower leg: No edema  Left lower leg: No edema  Lymphadenopathy:      Cervical: No cervical adenopathy  Skin:     General: Skin is warm and dry  Neurological:      General: No focal deficit present  Mental Status: She is alert     Psychiatric:         Mood and Affect: Mood normal                     [facility] [address] [facility]   10/24/2022  11:12 AM

## 2022-10-24 ENCOUNTER — OFFICE VISIT (OUTPATIENT)
Dept: FAMILY MEDICINE CLINIC | Facility: CLINIC | Age: 82
End: 2022-10-24
Payer: COMMERCIAL

## 2022-10-24 ENCOUNTER — APPOINTMENT (OUTPATIENT)
Dept: LAB | Facility: CLINIC | Age: 82
End: 2022-10-24
Payer: COMMERCIAL

## 2022-10-24 VITALS
BODY MASS INDEX: 25.87 KG/M2 | HEIGHT: 63 IN | TEMPERATURE: 98.5 F | HEART RATE: 75 BPM | SYSTOLIC BLOOD PRESSURE: 109 MMHG | WEIGHT: 146 LBS | OXYGEN SATURATION: 94 % | DIASTOLIC BLOOD PRESSURE: 61 MMHG

## 2022-10-24 DIAGNOSIS — I10 PRIMARY HYPERTENSION: ICD-10-CM

## 2022-10-24 DIAGNOSIS — E11.29 TYPE 2 DIABETES MELLITUS WITH MICROALBUMINURIA, WITHOUT LONG-TERM CURRENT USE OF INSULIN (HCC): Primary | ICD-10-CM

## 2022-10-24 DIAGNOSIS — Z23 ENCOUNTER FOR IMMUNIZATION: ICD-10-CM

## 2022-10-24 DIAGNOSIS — M1A.4710 OTHER SECONDARY CHRONIC GOUT OF RIGHT ANKLE WITHOUT TOPHUS: ICD-10-CM

## 2022-10-24 DIAGNOSIS — E83.52 HYPERCALCEMIA: ICD-10-CM

## 2022-10-24 DIAGNOSIS — E78.2 HYPERLIPIDEMIA, MIXED: ICD-10-CM

## 2022-10-24 DIAGNOSIS — R80.9 TYPE 2 DIABETES MELLITUS WITH MICROALBUMINURIA, WITHOUT LONG-TERM CURRENT USE OF INSULIN (HCC): Primary | ICD-10-CM

## 2022-10-24 LAB
25(OH)D3 SERPL-MCNC: 39.6 NG/ML (ref 30–100)
CALCIUM SERPL-MCNC: 10.5 MG/DL (ref 8.3–10.1)
PHOSPHATE SERPL-MCNC: 2.7 MG/DL (ref 2.3–4.1)
PTH-INTACT SERPL-MCNC: 22.7 PG/ML (ref 18.4–80.1)

## 2022-10-24 PROCEDURE — 99214 OFFICE O/P EST MOD 30 MIN: CPT | Performed by: FAMILY MEDICINE

## 2022-10-24 PROCEDURE — 82310 ASSAY OF CALCIUM: CPT

## 2022-10-24 PROCEDURE — 83970 ASSAY OF PARATHORMONE: CPT

## 2022-10-24 PROCEDURE — 90662 IIV NO PRSV INCREASED AG IM: CPT | Performed by: FAMILY MEDICINE

## 2022-10-24 PROCEDURE — 36415 COLL VENOUS BLD VENIPUNCTURE: CPT

## 2022-10-24 PROCEDURE — G0008 ADMIN INFLUENZA VIRUS VAC: HCPCS | Performed by: FAMILY MEDICINE

## 2022-10-24 PROCEDURE — 84100 ASSAY OF PHOSPHORUS: CPT

## 2022-10-24 PROCEDURE — 82306 VITAMIN D 25 HYDROXY: CPT

## 2022-10-24 RX ORDER — COLCHICINE 0.6 MG/1
TABLET ORAL
Qty: 15 TABLET | Refills: 5 | Status: SHIPPED | OUTPATIENT
Start: 2022-10-24

## 2022-10-26 DIAGNOSIS — E83.52 HYPERCALCEMIA: Primary | ICD-10-CM

## 2022-11-12 DIAGNOSIS — I10 ESSENTIAL HYPERTENSION: ICD-10-CM

## 2022-11-14 RX ORDER — LISINOPRIL 20 MG/1
20 TABLET ORAL DAILY
Qty: 90 TABLET | Refills: 1 | Status: SHIPPED | OUTPATIENT
Start: 2022-11-14

## 2023-01-05 DIAGNOSIS — Z12.83 SKIN CANCER SCREENING: ICD-10-CM

## 2023-01-05 DIAGNOSIS — Z13.220 LIPID SCREENING: ICD-10-CM

## 2023-01-05 DIAGNOSIS — M1A.4710 OTHER SECONDARY CHRONIC GOUT OF RIGHT ANKLE WITHOUT TOPHUS: ICD-10-CM

## 2023-01-05 DIAGNOSIS — Z00.00 MEDICARE ANNUAL WELLNESS VISIT, SUBSEQUENT: ICD-10-CM

## 2023-01-05 DIAGNOSIS — B35.1 TOENAIL FUNGUS: ICD-10-CM

## 2023-01-05 DIAGNOSIS — E11.9 TYPE 2 DIABETES MELLITUS WITHOUT COMPLICATION, WITHOUT LONG-TERM CURRENT USE OF INSULIN (HCC): ICD-10-CM

## 2023-01-05 DIAGNOSIS — M43.6 TORTICOLLIS: ICD-10-CM

## 2023-01-05 DIAGNOSIS — E78.2 HYPERLIPIDEMIA, MIXED: ICD-10-CM

## 2023-01-05 DIAGNOSIS — I10 ESSENTIAL HYPERTENSION: ICD-10-CM

## 2023-01-05 DIAGNOSIS — M81.0 AGE-RELATED OSTEOPOROSIS WITHOUT CURRENT PATHOLOGICAL FRACTURE: ICD-10-CM

## 2023-01-05 RX ORDER — ACARBOSE 50 MG/1
TABLET ORAL
Qty: 90 TABLET | Refills: 1 | Status: SHIPPED | OUTPATIENT
Start: 2023-01-05

## 2023-01-13 DIAGNOSIS — M1A.4710 OTHER SECONDARY CHRONIC GOUT OF RIGHT ANKLE WITHOUT TOPHUS: ICD-10-CM

## 2023-01-13 RX ORDER — COLCHICINE 0.6 MG/1
TABLET ORAL
Qty: 15 TABLET | Refills: 5 | Status: SHIPPED | OUTPATIENT
Start: 2023-01-13

## 2023-01-18 ENCOUNTER — RA CDI HCC (OUTPATIENT)
Dept: OTHER | Facility: HOSPITAL | Age: 83
End: 2023-01-18

## 2023-01-18 NOTE — PROGRESS NOTES
[address]  coding opportunities     [address]     Chart Reviewed number of suggestions sent to Provider: 1     Patients Insurance     Medicare Insurance: [address] [place] [address]

## 2023-01-23 ENCOUNTER — TELEPHONE (OUTPATIENT)
Dept: FAMILY MEDICINE CLINIC | Facility: CLINIC | Age: 83
End: 2023-01-23

## 2023-01-23 NOTE — TELEPHONE ENCOUNTER
Patient has an appointment tomorrow and wonders if she needs to get any labs prior      Call to let her know either way

## 2023-01-24 ENCOUNTER — OFFICE VISIT (OUTPATIENT)
Dept: FAMILY MEDICINE CLINIC | Facility: CLINIC | Age: 83
End: 2023-01-24

## 2023-01-24 VITALS
TEMPERATURE: 97.3 F | HEART RATE: 81 BPM | DIASTOLIC BLOOD PRESSURE: 90 MMHG | OXYGEN SATURATION: 98 % | BODY MASS INDEX: 25.87 KG/M2 | HEIGHT: 63 IN | SYSTOLIC BLOOD PRESSURE: 142 MMHG | WEIGHT: 146 LBS

## 2023-01-24 DIAGNOSIS — E78.2 HYPERLIPIDEMIA, MIXED: ICD-10-CM

## 2023-01-24 DIAGNOSIS — E11.29 TYPE 2 DIABETES MELLITUS WITH MICROALBUMINURIA, WITHOUT LONG-TERM CURRENT USE OF INSULIN (HCC): Primary | ICD-10-CM

## 2023-01-24 DIAGNOSIS — E61.1 IRON DEFICIENCY: ICD-10-CM

## 2023-01-24 DIAGNOSIS — R80.9 TYPE 2 DIABETES MELLITUS WITH MICROALBUMINURIA, WITHOUT LONG-TERM CURRENT USE OF INSULIN (HCC): Primary | ICD-10-CM

## 2023-01-24 DIAGNOSIS — E83.52 HYPERCALCEMIA: ICD-10-CM

## 2023-01-24 DIAGNOSIS — I10 PRIMARY HYPERTENSION: ICD-10-CM

## 2023-01-24 DIAGNOSIS — M1A.4710 OTHER SECONDARY CHRONIC GOUT OF RIGHT ANKLE WITHOUT TOPHUS: ICD-10-CM

## 2023-01-24 LAB — SL AMB POCT HEMOGLOBIN AIC: 8.4 (ref ?–6.5)

## 2023-01-24 NOTE — PROGRESS NOTES
Luis Antonio Fitzpatrick 1940 female MRN: [id number]      ASSESSMENT/PLAN  Problem List Items Addressed This Visit        Endocrine    Type 2 diabetes mellitus with microalbuminuria, without long-term current use of insulin ([address] ) - Primary    Relevant Medications    metFORMIN (GLUCOPHAGE) 1000 MG tablet    Other Relevant Orders    POCT hemoglobin A1c (Completed)    Comprehensive metabolic panel    Lipid panel    HEMOGLOBIN A1C W/ EAG ESTIMATION    Ambulatory referral to Diabetic Education       Cardiovascular and Mediastinum    Hypertension    Relevant Orders    Comprehensive metabolic panel    Lipid panel       Other    Gout    Relevant Orders    Uric acid    Hypercalcemia    Relevant Orders    PTH, intact    Vitamin D 25 hydroxy    Phosphorus    Hyperlipidemia, mixed    Relevant Orders    Comprehensive metabolic panel    Lipid panel    Iron deficiency    Relevant Orders    CBC and differential    Iron Panel (Includes Ferritin, Iron Sat%, Iron, and TIBC)     DM: A1c 8 4% (up from 7 8) -- increased and above goal, will increase Metformin 1000 mg BID  Will also refer to DM Ed    HTN: Borderline in office, but overall well controlled at home unless pt is stressed  HLD: Update lipids prior to next appointment    Future Appointments   Date Time Provider Beatriz Duran   4/25/2023 11:00 AM DO ARABELLA [facility]          SUBJECTIVE  CC: Follow-up and Diabetes      HPI:  Luis Antonio Fitzpatrick is a [de-identified] y o  female who presents for chronic follow up as detailed below  DM: Home sugars none; denies hypo/hyperglycemic symptoms   HTN: Home BPs was elevated this morning, in the afternoon is usually in 130s   HLD: Not on statin       Review of Systems   Constitutional: Negative for diaphoresis  Eyes: Negative for visual disturbance  Respiratory: Negative for cough and shortness of breath  Cardiovascular: Negative for chest pain, palpitations and leg swelling     Gastrointestinal: Negative for abdominal pain, constipation and diarrhea  Endocrine: Negative for polydipsia and polyuria  Neurological: Negative for dizziness, tremors and headaches  Psychiatric/Behavioral: Negative for sleep disturbance         Historical Information   The patient history was reviewed and updated as follows:    Past Medical History:   Diagnosis Date   • Diabetes mellitus ([facility] [address] )    • Hypertension    • Osteoporosis     pt denies on PT eval 20     Past Surgical History:   Procedure Laterality Date   •  SECTION       Family History   Problem Relation Age of Onset   • Diabetes Mother    • COPD Mother    • Substance Abuse Mother       Social History   Social History     Substance and Sexual Activity   Alcohol Use Never     Social History     Substance and Sexual Activity   Drug Use Never     Social History     Tobacco Use   Smoking Status Never   Smokeless Tobacco Never       Medications:     Current Outpatient Medications:   •  metFORMIN (GLUCOPHAGE) 1000 MG tablet, Take 1 tablet (1,000 mg total) by mouth 2 (two) times a day with meals, Disp: 180 tablet, Rfl: 1  •  acarbose (PRECOSE) 50 mg tablet, TAKE 1 TABLET BY MOUTH DAILY WITH BREAKFAST, Disp: 90 tablet, Rfl: 1  •  Alcohol Swabs (Pharmacist Choice Alcohol) PADS, Use as directed, Disp: , Rfl:   •  aspirin 81 mg chewable tablet, Chew 81 mg daily, Disp: , Rfl:   •  colchicine (COLCRYS) 0 6 mg tablet, TAKE 2 TABLETS BY MOUTH, THEN 1 HOUR LATER TAKE 1 TABLE BY MOUTH AS NEEDED FOR ACUTE GOUT FLARE, Disp: 15 tablet, Rfl: 5  •  ferrous sulfate 324 (65 Fe) mg, TAKE 1 TABLET (324 MG TOTAL) BY MOUTH DAILY BEFORE BREAKFAST, Disp: 90 tablet, Rfl: 1  •  glucose blood test strip, MEDICARE B, Disp: , Rfl:   •  Lancet Devices (Adjustable Lancing Device) MISC, MEDICARE B, Disp: , Rfl:   •  LIFESCAN FINEPOINT LANCETS MISC, by Does not apply route daily, Disp: 100 each, Rfl: 3  •  lisinopril (ZESTRIL) 20 mg tablet, TAKE 1 TABLET (20 MG TOTAL) BY MOUTH DAILY, Disp: 90 tablet, Rfl: 1  •  OneTouch Verio test strip, USE TO TEST BLOOD SUGAR DAILY AS DIRECTED, Disp: 100 each, Rfl: 3  No Known Allergies    OBJECTIVE    Vitals:   Vitals:    01/24/23 1041   BP: 142/90   BP Location: Left arm   Patient Position: Sitting   Cuff Size: Adult   Pulse: 81   Temp: (!) 97 3 °F (36 3 °C)   SpO2: 98%   Weight: 66 2 kg (146 lb)   Height: 5' 3" (1 6 m)           Physical Exam  Vitals and nursing note reviewed  Constitutional:       General: She is not in acute distress  Appearance: Normal appearance  HENT:      Head: Normocephalic and atraumatic  Right Ear: Tympanic membrane, ear canal and external ear normal       Left Ear: Tympanic membrane, ear canal and external ear normal       Nose: Nose normal       Mouth/Throat:      Mouth: Mucous membranes are moist       Pharynx: No oropharyngeal exudate or posterior oropharyngeal erythema  Eyes:      Conjunctiva/sclera: Conjunctivae normal    Cardiovascular:      Rate and Rhythm: Normal rate and regular rhythm  Pulmonary:      Effort: Pulmonary effort is normal  No respiratory distress  Breath sounds: Normal breath sounds  Abdominal:      General: Bowel sounds are normal  There is no distension  Palpations: Abdomen is soft  Tenderness: There is no abdominal tenderness  Musculoskeletal:      Right lower leg: No edema  Left lower leg: No edema  Lymphadenopathy:      Cervical: No cervical adenopathy  Skin:     General: Skin is warm and dry  Neurological:      General: No focal deficit present  Mental Status: She is alert     Psychiatric:         Mood and Affect: Mood normal                     DO Tracie Ryan [address] [facility]   1/24/2023  11:05 AM

## 2023-02-01 DIAGNOSIS — M1A.4710 OTHER SECONDARY CHRONIC GOUT OF RIGHT ANKLE WITHOUT TOPHUS: ICD-10-CM

## 2023-02-01 RX ORDER — COLCHICINE 0.6 MG/1
TABLET ORAL
Qty: 15 TABLET | Refills: 5 | Status: SHIPPED | OUTPATIENT
Start: 2023-02-01

## 2023-03-15 DIAGNOSIS — E61.1 IRON DEFICIENCY: ICD-10-CM

## 2023-03-15 RX ORDER — FERROUS SULFATE TAB EC 324 MG (65 MG FE EQUIVALENT) 324 (65 FE) MG
324 TABLET DELAYED RESPONSE ORAL
Qty: 90 TABLET | Refills: 1 | Status: SHIPPED | OUTPATIENT
Start: 2023-03-15

## 2023-03-23 DIAGNOSIS — M1A.4710 OTHER SECONDARY CHRONIC GOUT OF RIGHT ANKLE WITHOUT TOPHUS: ICD-10-CM

## 2023-03-23 RX ORDER — COLCHICINE 0.6 MG/1
TABLET ORAL
Qty: 15 TABLET | Refills: 5 | Status: SHIPPED | OUTPATIENT
Start: 2023-03-23

## 2023-04-07 DIAGNOSIS — M1A.4710 OTHER SECONDARY CHRONIC GOUT OF RIGHT ANKLE WITHOUT TOPHUS: ICD-10-CM

## 2023-04-07 RX ORDER — COLCHICINE 0.6 MG/1
TABLET ORAL
Qty: 15 TABLET | Refills: 5 | Status: SHIPPED | OUTPATIENT
Start: 2023-04-07

## 2023-04-17 PROCEDURE — 84166 PROTEIN E-PHORESIS/URINE/CSF: CPT | Performed by: FAMILY MEDICINE

## 2023-04-18 LAB
ALBUMIN UR ELPH-MCNC: 100 %
ALPHA1 GLOB MFR UR ELPH: 0 %
ALPHA2 GLOB MFR UR ELPH: 0 %
B-GLOBULIN MFR UR ELPH: 0 %
GAMMA GLOB MFR UR ELPH: 0 %
PROT PATTERN UR ELPH-IMP: ABNORMAL
PROT UR-MCNC: 90 MG/DL

## 2023-04-24 ENCOUNTER — OFFICE VISIT (OUTPATIENT)
Dept: FAMILY MEDICINE CLINIC | Facility: CLINIC | Age: 83
End: 2023-04-24

## 2023-04-24 ENCOUNTER — TELEPHONE (OUTPATIENT)
Dept: FAMILY MEDICINE CLINIC | Facility: CLINIC | Age: 83
End: 2023-04-24

## 2023-04-24 VITALS
HEIGHT: 63 IN | SYSTOLIC BLOOD PRESSURE: 151 MMHG | DIASTOLIC BLOOD PRESSURE: 86 MMHG | OXYGEN SATURATION: 96 % | HEART RATE: 93 BPM | BODY MASS INDEX: 24.45 KG/M2 | WEIGHT: 138 LBS | TEMPERATURE: 98.3 F

## 2023-04-24 DIAGNOSIS — M1A.4710 OTHER SECONDARY CHRONIC GOUT OF RIGHT ANKLE WITHOUT TOPHUS: ICD-10-CM

## 2023-04-24 DIAGNOSIS — I10 PRIMARY HYPERTENSION: ICD-10-CM

## 2023-04-24 DIAGNOSIS — R79.89 ABNORMAL CBC: ICD-10-CM

## 2023-04-24 DIAGNOSIS — R80.9 TYPE 2 DIABETES MELLITUS WITH MICROALBUMINURIA, WITHOUT LONG-TERM CURRENT USE OF INSULIN (HCC): Primary | ICD-10-CM

## 2023-04-24 DIAGNOSIS — E83.52 HYPERCALCEMIA: ICD-10-CM

## 2023-04-24 DIAGNOSIS — E61.1 IRON DEFICIENCY: ICD-10-CM

## 2023-04-24 DIAGNOSIS — E11.29 TYPE 2 DIABETES MELLITUS WITH MICROALBUMINURIA, WITHOUT LONG-TERM CURRENT USE OF INSULIN (HCC): Primary | ICD-10-CM

## 2023-04-24 DIAGNOSIS — E78.2 HYPERLIPIDEMIA, MIXED: ICD-10-CM

## 2023-04-24 NOTE — PROGRESS NOTES
Marilee Douglass 1940 female MRN: [id number]      ASSESSMENT/PLAN  Problem List Items Addressed This Visit        Endocrine    Type 2 diabetes mellitus with microalbuminuria, without long-term current use of insulin ([facility] [address] ) - Primary    Relevant Medications    semaglutide (Rybelsus) 3 MG tablet    semaglutide (Rybelsus) 7 MG tablet (Start on 5/24/2023)       Cardiovascular and Mediastinum    Hypertension       Other    Gout    Hypercalcemia    Relevant Orders    Ambulatory referral to Endocrinology    Hyperlipidemia, mixed    Iron deficiency   Other Visit Diagnoses     Abnormal CBC        Relevant Orders    CBC and differential        DM: Uncontrolled despite increase in Metformin at last visit  Stop Acarbose, start Rybelsus  Reviewed mechanism of action and possible ADRs including abdominal bloating, nausea/GERD/belching  No personal history of pancreatitis, thyroid CA and no family history of MEN syndrome  To call if not tolerating, pt would like to follow up in 3 months  Pt defers DM Ed, reviewed suggested diet changes  Encouraged to monitor blood sugars  HTN: Above goal, likely secondary to acute pain from gout  Once gout improved, encouraged to monitor home BP daily x1 week and send log  HLD: Well controlled without statin   Hypercalcemia: Unclear etiology, as hyperCa persistent despite stopping supplement and further testing unrevealing  Will refer to Endo to further evaluate   Gout: Pt is going to get Colchicine refill from pharmacy for current flare   Iron Deficiency: Continue supplement     Repeat CBC in 2-4 weeks to monitor WBC       Future Appointments   Date Time Provider Beatriz Duran   7/28/2023  2:20 PM DO ARABELLA [facility]          SUBJECTIVE  CC: Gout flare up (Left foot, patient having difficulty walking) and Diabetes      HPI:  Marilee Douglass is a [de-identified] y o  female who presents with her niece for chronic follow up and lab review       DM: A1c 9 1% (from 8 4); home sugars none, has not been cautious with her diet   HTN: Home BPs none   HLD: Lipids: Total 131, LDL 59, HDL 57, TG 73; LFTs WNL  Hypercalcemia: Ca 11, Phos 3 3, D 35, PTH 20; SPEP/UPEP (-) monoclonal bands; PTHrP (-)   Gout: Uric Acid 4 4, having flare up in her R ankle   Iron Deficiency: WBC 2 7 (ANC 1 04), otherwise CBC benign; Iron 35, Ferritin 76 -- did have a recent GI bug     Cr 0 89/GFR 61      Review of Systems   Eyes: Negative for visual disturbance  Respiratory: Negative for cough and shortness of breath  Cardiovascular: Negative for chest pain, palpitations and leg swelling  Gastrointestinal: Negative for abdominal pain, constipation and diarrhea  Endocrine: Positive for cold intolerance  Negative for polyuria  Genitourinary: Negative for dysuria  Musculoskeletal: Positive for arthralgias  Neurological: Negative for dizziness and headaches         Historical Information   The patient history was reviewed and updated as follows:    Past Medical History:   Diagnosis Date   • Diabetes mellitus ([facility] [address] )    • Hypertension    • Osteoporosis     pt denies on PT eval 20     Past Surgical History:   Procedure Laterality Date   •  SECTION       Family History   Problem Relation Age of Onset   • Diabetes Mother    • COPD Mother    • Substance Abuse Mother       Social History   Social History     Substance and Sexual Activity   Alcohol Use Never     Social History     Substance and Sexual Activity   Drug Use Never     Social History     Tobacco Use   Smoking Status Never   Smokeless Tobacco Never       Medications:     Current Outpatient Medications:   •  ferrous sulfate 324 (65 Fe) mg, TAKE 1 TABLET (324 MG TOTAL) BY MOUTH DAILY BEFORE BREAKFAST, Disp: 90 tablet, Rfl: 1  •  semaglutide (Rybelsus) 3 MG tablet, Take 1 tablet (3 mg total) by mouth daily Take on an empty stomach with 4 oz water, and wait 30 minutes before eating, Disp: 30 tablet, Rfl: 0  •  [START ON 2023] semaglutide (Rybelsus) 7 "MG tablet, Take 1 tablet (7 mg total) by mouth daily Take on an empty stomach with 4 oz water, and wait 30 minutes before eating Do not start before May 24, 2023 , Disp: 90 tablet, Rfl: 1  •  Alcohol Swabs (Pharmacist Choice Alcohol) PADS, Use as directed, Disp: , Rfl:   •  aspirin 81 mg chewable tablet, Chew 81 mg daily, Disp: , Rfl:   •  colchicine (COLCRYS) 0 6 mg tablet, TAKE 2 TABLETS BY MOUTH, THEN 1 HOUR LATER TAKE 1 TABLET BY MOUTH AS NEEDED FOR ACUTE GOUT FLARE, Disp: 15 tablet, Rfl: 5  •  glucose blood test strip, MEDICARE B, Disp: , Rfl:   •  Lancet Devices (Adjustable Lancing Device) MISC, MEDICARE B, Disp: , Rfl:   •  [employer] FINEPOINT LANCETS MISC, by Does not apply route daily, Disp: 100 each, Rfl: 3  •  lisinopril (ZESTRIL) 20 mg tablet, TAKE 1 TABLET (20 MG TOTAL) BY MOUTH DAILY, Disp: 90 tablet, Rfl: 1  •  metFORMIN (GLUCOPHAGE) 1000 MG tablet, Take 1 tablet (1,000 mg total) by mouth 2 (two) times a day with meals, Disp: 180 tablet, Rfl: 1  •  OneTouch Verio test strip, USE TO TEST BLOOD SUGAR DAILY AS DIRECTED, Disp: 100 each, Rfl: 3  No Known Allergies    OBJECTIVE    Vitals:   Vitals:    04/24/23 1400   BP: 151/86   Pulse: 93   Temp: 98 3 °F (36 8 °C)   SpO2: 96%   Weight: 62 6 kg (138 lb)   Height: 5' 3\" (1 6 m)           Physical Exam  Vitals and nursing note reviewed  Constitutional:       General: She is not in acute distress  Appearance: Normal appearance  HENT:      Head: Normocephalic and atraumatic  Right Ear: Tympanic membrane, ear canal and external ear normal       Left Ear: Tympanic membrane, ear canal and external ear normal       Nose: Nose normal       Mouth/Throat:      Mouth: Mucous membranes are moist       Pharynx: No oropharyngeal exudate or posterior oropharyngeal erythema  Eyes:      Conjunctiva/sclera: Conjunctivae normal    Cardiovascular:      Rate and Rhythm: Normal rate and regular rhythm     Pulmonary:      Effort: Pulmonary effort is normal  No " respiratory distress  Breath sounds: Normal breath sounds  Abdominal:      General: Bowel sounds are normal  There is no distension  Palpations: Abdomen is soft  Tenderness: There is no abdominal tenderness  Musculoskeletal:      Right lower leg: No edema  Left lower leg: No edema  Lymphadenopathy:      Cervical: No cervical adenopathy  Skin:     General: Skin is warm and dry  Neurological:      General: No focal deficit present  Mental Status: She is alert     Psychiatric:         Mood and Affect: Mood normal                     DO [facility] [address] [facility]   4/24/2023  2:37 PM

## 2023-04-24 NOTE — TELEPHONE ENCOUNTER
Patient called and stated that she can not afforf the Rybelus and is going to stick with the medication she had before

## 2023-04-28 RX ORDER — COLCHICINE 0.6 MG/1
TABLET ORAL
Qty: 15 TABLET | Refills: 5 | Status: SHIPPED | OUTPATIENT
Start: 2023-04-28

## 2023-05-01 NOTE — TELEPHONE ENCOUNTER
Spoke with [place] Thomas and she would try something else if it was lass costly   The rybelsus will be over $100 a month

## 2023-05-02 DIAGNOSIS — E11.29 TYPE 2 DIABETES MELLITUS WITH MICROALBUMINURIA, WITHOUT LONG-TERM CURRENT USE OF INSULIN: Primary | ICD-10-CM

## 2023-05-02 DIAGNOSIS — R80.9 TYPE 2 DIABETES MELLITUS WITH MICROALBUMINURIA, WITHOUT LONG-TERM CURRENT USE OF INSULIN: Primary | ICD-10-CM

## 2023-05-02 NOTE — TELEPHONE ENCOUNTER
Ok -- sent in a script for [employer] instead  This medication works by getting rid of excess sugar in your urine -- common side effects include possible vaginal irritation/itching/UTI, should monitor for these symptoms  Please have her let me know if this is too expensive

## 2023-05-04 ENCOUNTER — APPOINTMENT (OUTPATIENT)
Dept: LAB | Facility: CLINIC | Age: 83
End: 2023-05-04

## 2023-05-04 DIAGNOSIS — R79.89 ABNORMAL CBC: ICD-10-CM

## 2023-05-04 LAB
BASOPHILS # BLD AUTO: 0.05 THOUSANDS/ÂΜL (ref 0–0.1)
BASOPHILS NFR BLD AUTO: 1 % (ref 0–1)
EOSINOPHIL # BLD AUTO: 0.17 THOUSAND/ÂΜL (ref 0–0.61)
EOSINOPHIL NFR BLD AUTO: 3 % (ref 0–6)
ERYTHROCYTE [DISTWIDTH] IN BLOOD BY AUTOMATED COUNT: 14.6 % (ref 11.6–15.1)
HCT VFR BLD AUTO: 36.9 % (ref 34.8–46.1)
HGB BLD-MCNC: 11 G/DL (ref 11.5–15.4)
IMM GRANULOCYTES # BLD AUTO: 0.01 THOUSAND/UL (ref 0–0.2)
IMM GRANULOCYTES NFR BLD AUTO: 0 % (ref 0–2)
LYMPHOCYTES # BLD AUTO: 2.46 THOUSANDS/ÂΜL (ref 0.6–4.47)
LYMPHOCYTES NFR BLD AUTO: 36 % (ref 14–44)
MCH RBC QN AUTO: 28 PG (ref 26.8–34.3)
MCHC RBC AUTO-ENTMCNC: 29.8 G/DL (ref 31.4–37.4)
MCV RBC AUTO: 94 FL (ref 82–98)
MONOCYTES # BLD AUTO: 0.64 THOUSAND/ÂΜL (ref 0.17–1.22)
MONOCYTES NFR BLD AUTO: 9 % (ref 4–12)
NEUTROPHILS # BLD AUTO: 3.49 THOUSANDS/ÂΜL (ref 1.85–7.62)
NEUTS SEG NFR BLD AUTO: 51 % (ref 43–75)
NRBC BLD AUTO-RTO: 0 /100 WBCS
PLATELET # BLD AUTO: 342 THOUSANDS/UL (ref 149–390)
PMV BLD AUTO: 9.6 FL (ref 8.9–12.7)
RBC # BLD AUTO: 3.93 MILLION/UL (ref 3.81–5.12)
WBC # BLD AUTO: 6.82 THOUSAND/UL (ref 4.31–10.16)

## 2023-05-05 DIAGNOSIS — R79.89 ABNORMAL CBC: Primary | ICD-10-CM

## 2023-05-05 NOTE — TELEPHONE ENCOUNTER
Attempted to call pt to discuss options -- no answer, left message  Would pt like to try a different medication or work on diet? We could link her up with our diabetic educator if she would like       Also would recommend repeating her CBC again in 2 weeks to monitor her hemoglobin

## 2023-05-11 NOTE — PROGRESS NOTES
New Patient Progress Note      Chief Complaint   Patient presents with   • Abnormal Calcium   • Diabetes Type 2        Impression & Plan:    Problem List Items Addressed This Visit        Endocrine    Type 2 diabetes mellitus with microalbuminuria, without long-term current use of insulin ([facility] [address] ) - Primary     Patient's A1C is above goal of 7 5-8%  Counseled on pathophysiology of diabetes  Counseled on negative, long-term effects associated with uncontrolled diabetes including neuropathy, nephropathy, retinopathy, heart attack, and stroke  Patient is typically eating only one meal daily  Counseled on the importance of eating more frequently throughout the day to help regulate blood sugar  Offered referral for MNT  Patient declines at this time  Good candidate for DDP-4 or SGLT-2  As patient reports hair loss with Januvia, she would prefer to try an SGLT-2  Counseled on mechanism of action as well as potential side effects, namely urinary tract infections and yeast infections  Encourage patient to remain very well-hydrated  She knows to notify me if medication is cost prohibitive  As patient's appetite is already on the low end of normal and her weight is healthy, I do not recommend starting a GLP-1 at this time  Continue to test blood sugars daily  Patient knows to notify me with persistent hyperglycemia or any episodes of hypoglycemia  Continue with regular physical activity  Follow-up in 3 months  Lab Results   Component Value Date    HGBA1C 9 1 (H) 04/17/2023            Relevant Medications    Empagliflozin 25 MG TABS    Other Relevant Orders    Hemoglobin A1C    Comprehensive metabolic panel       Cardiovascular and Mediastinum    Hypertension     /70  Continue current regimen  Other    Hypercalcemia     Reviewed labs with patient  Reviewed significance of elevated serum calcium  Reviewed PTH and its role in calcium regulation    At this time, patient is completely asymptomatic of "mildly elevated calcium  Corrected calcium is higher due to low albumin  Encourage patient to increase protein in her diet  Encouraged to remain very well-hydrated  As she has not had any kidney stones nor does she have osteoporosis, I believe continued monitoring is appropriate  Relevant Orders    Comprehensive metabolic panel       Orders Placed This Encounter   Procedures   • Hemoglobin A1C     Standing Status:   Future     Standing Expiration Date:   5/12/2024   • Comprehensive metabolic panel     This is a patient instruction: Patient fasting for 8 hours or longer recommended  Standing Status:   Future     Standing Expiration Date:   5/12/2024       History of Present Illness:   Jemima Art is a [de-identified] y o  female with type 2 diabetes and hypercalcemia presenting to the office today for consultaion  Referred by PCP, Dr Jaja Plascencia DO  Past medical history significant for hypertension, gout, hyperlipidemia, and iron deficiency  Component      Latest Ref Rng & Units 1/24/2023 4/17/2023          10:50 AM  9:05 AM   Hemoglobin A1C      Normal 3 8-5 6%; PreDiabetic 5 7-6 4%; Diabetic >=6 5%; Glycemic control for adults with diabetes <7 0% % 8 4 (A) 9 1 (H)   eAG, EST AVG Glucose      mg/dl  214        Current regimen:   Metformin 1,000 mg BID    Patient reports she was initially diagnosed with type 2 diabetes approximately 10 years ago while still living in [place]  She does have a begin family history of type 2 diabetes in her mother and brother  She is currently taking 1000 mg of metformin twice daily  She denies any GI side effects  Previous medications include Januvia which patient self discontinued due to hair loss, acarbose which caused GI upset, and Trulicity which was prescribed but never taken  She denies any symptoms of hyperglycemia including persistent fatigue, polydipsia, polyuria, and blurred vision  She reports that she \"feels great\"    Test blood sugar once " daily: 306, 216, 167, 192    Denies any symptoms of hypoglycemia  Denies any glucometer readings less than 70 mg/dL  Patient reports that she typically only eats 1-2 meals daily  She will eat cereal for breakfast, typically skips lunch, then eats dinner  She is currently living with her niece and therefore eating what is made for her there  She does admit to occasional snacking on sweets such as donuts  However, she lives fresh fruit and vegetables and typically eats a salad for dinner  She is physically active  She works 3 times a week at [employer] and reports that she walks frequently throughout those shifts  Occasionally, mobility is hindered by gout flares  Denies any hospitalizations for severe hyperglycemia or any episodes of hypoglycemia  Denies history of heart attack and stroke  Denies neuropathy and diabetic retinopathy  Last microalbumin/creatinine ratio from 10/19/2022 does show microalbuminuria  Component      Latest Ref Rng & Units 10/19/2022           7:45 AM   EXT Creatinine Urine      mg/dL 197 0   MICROALBUM ,U,RANDOM      0 0 - 20 0 mg/L 131 0 (H)   MICROALBUMIN/CREATININE RATIO      0 - 30 mg/g creatinine 66 (H)     Patient has had mildly elevated serum calcium for at least the last 3 years  Denies history of kidney stones  DEXA scan completed in 2021 showed normal bone density  Denies any atraumatic fractures  Denies any knowledge of family history of parathyroid dysfunction  PCP completed an extensive work-up that demonstrated normal vitamin D and phosphorus levels  PTH is in the low normal range  PTH related peptide negative  Patient denies fatigue, constipation, polydipsia, and polyuria  She reports that she remains well-hydrated  Active calcium is significantly elevated due to low albumin      Component      Latest Ref Rng & Units 7/20/2022 10/19/2022 10/24/2022 4/17/2023           7:41 AM  7:45 AM 11:12 AM  9:05 AM   CORRECTED CALCIUM      8 3 - 10 1 mg/dL 10 2 (H) 10  8 (H)  11 0 (H)   AST      5 - 45 U/L 15 10  23   ALT      12 - 78 U/L 22 20  23   Alkaline Phosphatase      46 - 116 U/L 56 60  65   Total Protein      6 4 - 8 4 g/dL 7 3 7 1  7 7   Albumin      3 5 - 5 0 g/dL 3 4 (L) 3 2 (L)  3 2 (L)     Component      Latest Ref Rng & Units 10/24/2022 2023          11:12 AM  9:05 AM   PARATHYROID HORMONE      18 4 - 80 1 pg/mL 22 7 20 1     Component      Latest Ref Rng & Units 2023           9:05 AM   eGFR      ml/min/1 73sq m 61     Patient Active Problem List   Diagnosis   • Type 2 diabetes mellitus with microalbuminuria, without long-term current use of insulin (HCC)   • Hypertension   • Hyperlipidemia, mixed   • Gout   • Torticollis   • Chronic neck pain   • Hypercalcemia   • Iron deficiency      Past Medical History:   Diagnosis Date   • Diabetes mellitus ([facility] [address] )    • Hypertension    • Osteoporosis     pt denies on PT eval 20      Past Surgical History:   Procedure Laterality Date   •  SECTION        Family History   Problem Relation Age of Onset   • Diabetes Mother    • COPD Mother    • Substance Abuse Mother      Social History     Tobacco Use   • Smoking status: Never   • Smokeless tobacco: Never   Substance Use Topics   • Alcohol use: Never     No Known Allergies      Current Outpatient Medications:   •  Empagliflozin 25 MG TABS, Take 1 tablet (25 mg total) by mouth every morning, Disp: 90 tablet, Rfl: 1  •  ferrous sulfate 324 (65 Fe) mg, TAKE 1 TABLET (324 MG TOTAL) BY MOUTH DAILY BEFORE BREAKFAST, Disp: 90 tablet, Rfl: 1  •  Alcohol Swabs (Pharmacist Choice Alcohol) PADS, Use as directed, Disp: , Rfl:   •  aspirin 81 mg chewable tablet, Chew 81 mg daily, Disp: , Rfl:   •  colchicine (COLCRYS) 0 6 mg tablet, TAKE 2 TABLETS BY MOUTH, THEN 1 HOUR LATER TAKE 1 TABLET BY MOUTH AS NEEDED FOR ACUTE GOUT FLARE, Disp: 15 tablet, Rfl: 5  •  glucose blood test strip, MEDICARE B, Disp: , Rfl:   •  Lancet Devices (Adjustable Lancing Device) MISC, MEDICARE "B, Disp: , Rfl:   •  [employer] FINEPOINT LANCETS MISC, by Does not apply route daily, Disp: 100 each, Rfl: 3  •  lisinopril (ZESTRIL) 20 mg tablet, TAKE 1 TABLET (20 MG TOTAL) BY MOUTH DAILY, Disp: 90 tablet, Rfl: 1  •  metFORMIN (GLUCOPHAGE) 1000 MG tablet, Take 1 tablet (1,000 mg total) by mouth 2 (two) times a day with meals, Disp: 180 tablet, Rfl: 1  •  OneTouch Verio test strip, USE TO TEST BLOOD SUGAR DAILY AS DIRECTED, Disp: 100 each, Rfl: 3    Review of Systems   Constitutional: Negative for activity change, appetite change, fatigue and unexpected weight change  HENT: Negative for dental problem, sore throat, trouble swallowing and voice change  Eyes: Negative for visual disturbance  Respiratory: Negative for cough, chest tightness and shortness of breath  Cardiovascular: Negative for chest pain, palpitations and leg swelling  Gastrointestinal: Negative for constipation, diarrhea, nausea and vomiting  Endocrine: Negative for polydipsia, polyphagia and polyuria  Genitourinary: Negative for frequency  Musculoskeletal: Positive for arthralgias  Negative for back pain, gait problem and myalgias  Skin: Negative for wound  Allergic/Immunologic: Negative for environmental allergies and food allergies  Neurological: Negative for dizziness, weakness, light-headedness, numbness and headaches  Psychiatric/Behavioral: Negative for decreased concentration, dysphoric mood and sleep disturbance  The patient is not nervous/anxious  Physical Exam:  Body mass index is 24 45 kg/m²  /70   Pulse 86   Ht 5' 3\" (1 6 m)   Wt 62 6 kg (138 lb)   SpO2 98%   BMI 24 45 kg/m²    Wt Readings from Last 3 Encounters:   05/12/23 62 6 kg (138 lb)   04/24/23 62 6 kg (138 lb)   01/24/23 66 2 kg (146 lb)       Physical Exam  Vitals reviewed  Constitutional:       General: She is not in acute distress  Appearance: She is well-developed  She is not ill-appearing     HENT:      Head: Normocephalic " and atraumatic  Eyes:      Pupils: Pupils are equal, round, and reactive to light  Neck:      Thyroid: No thyromegaly  Cardiovascular:      Rate and Rhythm: Normal rate and regular rhythm  Pulses: Normal pulses  Heart sounds: Normal heart sounds  Pulmonary:      Effort: Pulmonary effort is normal       Breath sounds: Normal breath sounds  Abdominal:      General: Bowel sounds are normal  There is no distension  Palpations: Abdomen is soft  Tenderness: There is no abdominal tenderness  Musculoskeletal:      Cervical back: Normal range of motion and neck supple  Right lower leg: No edema  Left lower leg: No edema  Lymphadenopathy:      Cervical: No cervical adenopathy  Skin:     General: Skin is warm and dry  Capillary Refill: Capillary refill takes less than 2 seconds  Neurological:      Mental Status: She is alert and oriented to person, place, and time  Gait: Gait normal    Psychiatric:         Mood and Affect: Mood normal          Behavior: Behavior normal            Labs:   Lab Results   Component Value Date    HGBA1C 9 1 (H) 04/17/2023    HGBA1C 8 4 (A) 01/24/2023    HGBA1C 7 8 (H) 10/19/2022     Lab Results   Component Value Date    CREATININE 0 89 04/17/2023    CREATININE 0 89 10/19/2022    CREATININE 0 80 07/20/2022    BUN 25 04/17/2023    K 4 0 04/17/2023     04/17/2023    CO2 25 04/17/2023     eGFR   Date Value Ref Range Status   04/17/2023 61 ml/min/1 73sq m Final     Lab Results   Component Value Date    HDL 57 04/17/2023    TRIG 73 04/17/2023     Lab Results   Component Value Date    ALT 23 04/17/2023    AST 23 04/17/2023    ALKPHOS 65 04/17/2023     Lab Results   Component Value Date    [id number] 1 010 07/16/2021     No results found for: FREET4, TSI          Discussed with the patient and all questioned fully answered  She will call me if any problems arise  Follow-up appointment in 4 months       Counseled patient on diagnostic results, prognosis, risk and benefit of treatment options, instruction for management, importance of treatment compliance, Risk  factor reduction and impressions    There are no Patient Instructions on file for this visit      VERONIQUE Fagan

## 2023-05-12 ENCOUNTER — CONSULT (OUTPATIENT)
Dept: ENDOCRINOLOGY | Facility: CLINIC | Age: 83
End: 2023-05-12

## 2023-05-12 VITALS
WEIGHT: 138 LBS | OXYGEN SATURATION: 98 % | HEIGHT: 63 IN | SYSTOLIC BLOOD PRESSURE: 140 MMHG | DIASTOLIC BLOOD PRESSURE: 70 MMHG | HEART RATE: 86 BPM | BODY MASS INDEX: 24.45 KG/M2

## 2023-05-12 DIAGNOSIS — E11.29 TYPE 2 DIABETES MELLITUS WITH MICROALBUMINURIA, WITHOUT LONG-TERM CURRENT USE OF INSULIN (HCC): Primary | ICD-10-CM

## 2023-05-12 DIAGNOSIS — R80.9 TYPE 2 DIABETES MELLITUS WITH MICROALBUMINURIA, WITHOUT LONG-TERM CURRENT USE OF INSULIN (HCC): Primary | ICD-10-CM

## 2023-05-12 DIAGNOSIS — I10 PRIMARY HYPERTENSION: ICD-10-CM

## 2023-05-12 DIAGNOSIS — M1A.4710 OTHER SECONDARY CHRONIC GOUT OF RIGHT ANKLE WITHOUT TOPHUS: ICD-10-CM

## 2023-05-12 DIAGNOSIS — E83.52 HYPERCALCEMIA: ICD-10-CM

## 2023-05-12 RX ORDER — COLCHICINE 0.6 MG/1
TABLET ORAL
Qty: 15 TABLET | Refills: 5 | Status: SHIPPED | OUTPATIENT
Start: 2023-05-12

## 2023-05-12 NOTE — ASSESSMENT & PLAN NOTE
Patient's A1C is above goal of 7 5-8%  Counseled on pathophysiology of diabetes  Counseled on negative, long-term effects associated with uncontrolled diabetes including neuropathy, nephropathy, retinopathy, heart attack, and stroke  Patient is typically eating only one meal daily  Counseled on the importance of eating more frequently throughout the day to help regulate blood sugar  Offered referral for MNT  Patient declines at this time  Good candidate for DDP-4 or SGLT-2  As patient reports hair loss with Januvia, she would prefer to try an SGLT-2  Counseled on mechanism of action as well as potential side effects, namely urinary tract infections and yeast infections  Encourage patient to remain very well-hydrated  She knows to notify me if medication is cost prohibitive  As patient's appetite is already on the low end of normal and her weight is healthy, I do not recommend starting a GLP-1 at this time  Continue to test blood sugars daily  Patient knows to notify me with persistent hyperglycemia or any episodes of hypoglycemia  Continue with regular physical activity  Follow-up in 3 months    Lab Results   Component Value Date    HGBA1C 9 1 (H) 04/17/2023

## 2023-05-12 NOTE — ASSESSMENT & PLAN NOTE
Reviewed labs with patient  Reviewed significance of elevated serum calcium  Reviewed PTH and its role in calcium regulation  At this time, patient is completely asymptomatic of mildly elevated calcium  Corrected calcium is higher due to low albumin  Encourage patient to increase protein in her diet  Encouraged to remain very well-hydrated  As she has not had any kidney stones nor does she have osteoporosis, I believe continued monitoring is appropriate

## 2023-05-16 ENCOUNTER — TELEPHONE (OUTPATIENT)
Dept: FAMILY MEDICINE CLINIC | Facility: CLINIC | Age: 83
End: 2023-05-16

## 2023-05-16 DIAGNOSIS — E11.29 TYPE 2 DIABETES MELLITUS WITH MICROALBUMINURIA, WITHOUT LONG-TERM CURRENT USE OF INSULIN: Primary | ICD-10-CM

## 2023-05-16 DIAGNOSIS — R80.9 TYPE 2 DIABETES MELLITUS WITH MICROALBUMINURIA, WITHOUT LONG-TERM CURRENT USE OF INSULIN: Primary | ICD-10-CM

## 2023-05-16 RX ORDER — NATEGLINIDE 60 MG/1
60 TABLET ORAL
Qty: 90 TABLET | Refills: 1 | Status: SHIPPED | OUTPATIENT
Start: 2023-05-16 | End: 2023-05-18 | Stop reason: SDUPTHER

## 2023-05-16 NOTE — TELEPHONE ENCOUNTER
Spoke to pt's niece, she stated that the pt was recently prescribed Acarbose for her diabetes  After speaking with the pharmacy and insurance company, the pt's niece states that the cost of this medication is too high for the pt  She is requesting a different medication be prescribed that may cost less   I could not find this medication in her med list

## 2023-05-18 DIAGNOSIS — E11.29 TYPE 2 DIABETES MELLITUS WITH MICROALBUMINURIA, WITHOUT LONG-TERM CURRENT USE OF INSULIN: ICD-10-CM

## 2023-05-18 DIAGNOSIS — R80.9 TYPE 2 DIABETES MELLITUS WITH MICROALBUMINURIA, WITHOUT LONG-TERM CURRENT USE OF INSULIN: ICD-10-CM

## 2023-05-18 RX ORDER — NATEGLINIDE 60 MG/1
60 TABLET ORAL 2 TIMES DAILY WITH MEALS
Qty: 60 TABLET | Refills: 1 | Status: SHIPPED | OUTPATIENT
Start: 2023-05-18 | End: 2023-07-26

## 2023-05-25 DIAGNOSIS — M1A.4710 OTHER SECONDARY CHRONIC GOUT OF RIGHT ANKLE WITHOUT TOPHUS: ICD-10-CM

## 2023-05-25 RX ORDER — COLCHICINE 0.6 MG/1
TABLET ORAL
Qty: 15 TABLET | Refills: 5 | Status: SHIPPED | OUTPATIENT
Start: 2023-05-25

## 2023-06-05 DIAGNOSIS — E11.29 TYPE 2 DIABETES MELLITUS WITH MICROALBUMINURIA, WITHOUT LONG-TERM CURRENT USE OF INSULIN (HCC): Primary | ICD-10-CM

## 2023-06-05 DIAGNOSIS — R80.9 TYPE 2 DIABETES MELLITUS WITH MICROALBUMINURIA, WITHOUT LONG-TERM CURRENT USE OF INSULIN (HCC): Primary | ICD-10-CM

## 2023-06-05 RX ORDER — LANCING DEVICE
EACH MISCELLANEOUS
Qty: 1 EACH | Refills: 1 | Status: SHIPPED | OUTPATIENT
Start: 2023-06-05

## 2023-06-19 DIAGNOSIS — R80.9 TYPE 2 DIABETES MELLITUS WITH MICROALBUMINURIA, WITHOUT LONG-TERM CURRENT USE OF INSULIN (HCC): ICD-10-CM

## 2023-06-19 DIAGNOSIS — E11.29 TYPE 2 DIABETES MELLITUS WITH MICROALBUMINURIA, WITHOUT LONG-TERM CURRENT USE OF INSULIN (HCC): ICD-10-CM

## 2023-06-19 DIAGNOSIS — I10 ESSENTIAL HYPERTENSION: ICD-10-CM

## 2023-06-19 RX ORDER — LISINOPRIL 20 MG/1
20 TABLET ORAL DAILY
Qty: 90 TABLET | Refills: 1 | Status: SHIPPED | OUTPATIENT
Start: 2023-06-19

## 2023-07-26 DIAGNOSIS — R80.9 TYPE 2 DIABETES MELLITUS WITH MICROALBUMINURIA, WITHOUT LONG-TERM CURRENT USE OF INSULIN (HCC): ICD-10-CM

## 2023-07-26 DIAGNOSIS — E11.29 TYPE 2 DIABETES MELLITUS WITH MICROALBUMINURIA, WITHOUT LONG-TERM CURRENT USE OF INSULIN (HCC): ICD-10-CM

## 2023-07-26 RX ORDER — NATEGLINIDE 60 MG/1
60 TABLET ORAL 2 TIMES DAILY WITH MEALS
Qty: 60 TABLET | Refills: 1 | Status: SHIPPED | OUTPATIENT
Start: 2023-07-26

## 2023-08-01 ENCOUNTER — OFFICE VISIT (OUTPATIENT)
Dept: URGENT CARE | Facility: CLINIC | Age: 83
End: 2023-08-01
Payer: COMMERCIAL

## 2023-08-01 VITALS
HEART RATE: 86 BPM | DIASTOLIC BLOOD PRESSURE: 78 MMHG | TEMPERATURE: 97.1 F | OXYGEN SATURATION: 97 % | SYSTOLIC BLOOD PRESSURE: 148 MMHG | RESPIRATION RATE: 18 BRPM

## 2023-08-01 DIAGNOSIS — M10.9 ACUTE GOUT OF LEFT HAND, UNSPECIFIED CAUSE: Primary | ICD-10-CM

## 2023-08-01 DIAGNOSIS — R04.0 ACUTE ANTERIOR EPISTAXIS: ICD-10-CM

## 2023-08-01 DIAGNOSIS — M10.9 ACUTE GOUT INVOLVING TOE OF LEFT FOOT, UNSPECIFIED CAUSE: ICD-10-CM

## 2023-08-01 PROCEDURE — 99213 OFFICE O/P EST LOW 20 MIN: CPT

## 2023-08-01 PROCEDURE — S9083 URGENT CARE CENTER GLOBAL: HCPCS

## 2023-08-01 RX ORDER — NAPROXEN 500 MG/1
500 TABLET ORAL 2 TIMES DAILY WITH MEALS
Qty: 14 TABLET | Refills: 0 | Status: SHIPPED | OUTPATIENT
Start: 2023-08-01 | End: 2023-08-08

## 2023-08-01 NOTE — PROGRESS NOTES
[facility]        NAME: Jamesetta Cabot is a 82 y.o. female  : 1940    MRN: [id number]  DATE: 2023  TIME: 1:07 PM    Assessment and Plan   Acute gout of left hand, unspecified cause [M10.9]  1. Acute gout of left hand, unspecified cause  naproxen (Naprosyn) 500 mg tablet      2. Acute gout involving toe of left foot, unspecified cause  naproxen (Naprosyn) 500 mg tablet      3. Acute anterior epistaxis          No acute nosebleed present. Will start on Naproxen for gout symptoms as she is not responding to colchine. Discussed completing previously ordered blood work and to follow-up with PCP for long-term management symptoms as we are unable to prescribe long-term therapy for gout in this setting, patient verbalizes understanding and relates she has an appointment with her PCP on . Patient Instructions     Take naproxen as directed for next week and continue other medications as previously prescribed. Apply nasal saline daily to prevent nosebleeds and use nasal afrin when nosebleeds occur. Follow-up with PCP in 3-5 days if no improvement of symptoms. Report to the ER if symptoms worsen. Chief Complaint     Chief Complaint   Patient presents with   • hands/feet swelling     Right hand and left foot became swollen last week.  left hand became swollen. Takes colchicine as needed. Nose bleed last week and today that lasted few minutes. Takes baby aspirin. Joint pain to wrists and elbows         History of Present Illness       82year old female presents for evaluation of left hand swelling and pain and left great toe pain ongoing for the past 7 days. She denies any known injury but relates a prior history of gout and ate clam chowder prior to the gout flare. She relates she has colchicine available prn but only started taking it today as "she doesn't feel like it's effective for her symptoms anymore." She has not tried any other interventions.  She reports the pain in her toe as 8/10 localized to her left great toe worsened with walking and associated with redness at the base of her toe. She also reports increased swelling to her left hand causing difficulty to move her fingers, she reports the pain in her hand as 8/10. She also reports a nosebleed she first noticed last week and again today. The nose bleed was in both nares and has resolved on its own. She is on aspirin daily. She has not tried any interventions for her nose symptoms. Leg Pain   The incident occurred 5 to 7 days ago. The incident occurred at home. There was no injury mechanism. The pain is present in the left foot. The quality of the pain is described as shooting and aching. The pain is at a severity of 8/10. The pain is moderate. The pain has been constant since onset. Pertinent negatives include no inability to bear weight, loss of motion, loss of sensation, muscle weakness, numbness or tingling. She reports no foreign bodies present. The symptoms are aggravated by palpation. Treatments tried: colchicine. The treatment provided no relief. Hand Pain   The incident occurred 5 to 7 days ago. The incident occurred at home. There was no injury mechanism. The pain is present in the left hand. The quality of the pain is described as aching. The pain does not radiate. The pain is at a severity of 8/10. The pain is moderate. Pertinent negatives include no chest pain, muscle weakness, numbness or tingling. The symptoms are aggravated by palpation and movement. Treatments tried: colchicine. The treatment provided no relief. Review of Systems   Review of Systems   Constitutional: Negative for activity change, appetite change, chills, fatigue and fever. Respiratory: Negative for chest tightness and shortness of breath. Cardiovascular: Negative for chest pain and palpitations. Gastrointestinal: Negative for abdominal pain, constipation, diarrhea, nausea and vomiting. Skin: Positive for color change. Negative for rash and wound. Allergic/Immunologic: Negative for environmental allergies and food allergies. Neurological: Negative for dizziness, tingling, light-headedness, numbness and headaches.          Current Medications       Current Outpatient Medications:   •  aspirin 81 mg chewable tablet, Chew 81 mg daily, Disp: , Rfl:   •  B Complex Vitamins (VITAMIN B COMPLEX PO), Take by mouth, Disp: , Rfl:   •  colchicine (COLCRYS) 0.6 mg tablet, TAKE 2 TABLETS BY MOUTH, THEN 1 HOUR LATER TAKE 1 TABLET BY MOUTH AS NEEDED FOR ACUTE GOUT FLARE, Disp: 15 tablet, Rfl: 5  •  ferrous sulfate 324 (65 Fe) mg, TAKE 1 TABLET (324 MG TOTAL) BY MOUTH DAILY BEFORE BREAKFAST, Disp: 90 tablet, Rfl: 1  •  glucose blood test strip, MEDICARE B, Disp: , Rfl:   •  Lancet Devices (Adjustable Lancing Device) MISC, MEDICARE B, Disp: 1 each, Rfl: 1  •  Handseeing InformationCAN FINEPOINT LANCETS MISC, by Does not apply route daily, Disp: 100 each, Rfl: 3  •  lisinopril (ZESTRIL) 20 mg tablet, TAKE 1 TABLET (20 MG TOTAL) BY MOUTH DAILY, Disp: 90 tablet, Rfl: 1  •  metFORMIN (GLUCOPHAGE) 1000 MG tablet, TAKE 1 TABLET (1,000 MG TOTAL) BY MOUTH 2 (TWO) TIMES A DAY WITH MEALS, Disp: 180 tablet, Rfl: 1  •  [facility] Natural Products (Osteo Bi-Flex/5-Loxin Advanced) TABS, Take by mouth, Disp: , Rfl:   •  naproxen (Naprosyn) 500 mg tablet, Take 1 tablet (500 mg total) by mouth 2 (two) times a day with meals for 7 days, Disp: 14 tablet, Rfl: 0  •  nateglinide (STARLIX) 60 mg tablet, TAKE 1 TABLET (60 MG TOTAL) BY MOUTH 2 (TWO) TIMES A DAY WITH MEALS, Disp: 60 tablet, Rfl: 1  •  OneTouch Verio test strip, USE TO TEST BLOOD SUGAR DAILY AS DIRECTED, Disp: 100 each, Rfl: 3  •  Alcohol Swabs (Pharmacist Choice Alcohol) PADS, Use as directed, Disp: , Rfl:     Current Allergies     Allergies as of 08/01/2023   • (No Known Allergies)            The following portions of the patient's history were reviewed and updated as appropriate: allergies, current medications, past family history, past medical history, past social history, past surgical history and problem list.     Past Medical History:   Diagnosis Date   • Diabetes mellitus ([address])    • Hypertension    • Osteoporosis     pt denies on PT eval 20       Past Surgical History:   Procedure Laterality Date   •  SECTION         Family History   Problem Relation Age of Onset   • Diabetes Mother    • COPD Mother    • Substance Abuse Mother          Medications have been verified. Objective   /78   Pulse 86   Temp (!) 97.1 °F (36.2 °C)   Resp 18   SpO2 97%        Physical Exam     Physical Exam  Vitals and nursing note reviewed. Constitutional:       General: She is awake. Appearance: Normal appearance. She is well-developed and normal weight. HENT:      Head: Normocephalic and atraumatic. Right Ear: Hearing, tympanic membrane, ear canal and external ear normal.      Left Ear: Hearing, tympanic membrane, ear canal and external ear normal.      Nose: No congestion or rhinorrhea. Right Nostril: Epistaxis present. Left Nostril: Epistaxis present. Right Turbinates: Not enlarged, swollen or pale. Left Turbinates: Not enlarged, swollen or pale. Right Sinus: No maxillary sinus tenderness or frontal sinus tenderness. Left Sinus: No maxillary sinus tenderness or frontal sinus tenderness. Comments: Dried blood present in both nares, no active bleeding present     Mouth/Throat:      Lips: Pink. Mouth: Mucous membranes are moist.      Pharynx: Oropharynx is clear. Tonsils: No tonsillar exudate or tonsillar abscesses. 2+ on the right. 2+ on the left. Eyes:      Conjunctiva/sclera: Conjunctivae normal.   Cardiovascular:      Rate and Rhythm: Normal rate and regular rhythm. Pulses: Normal pulses. Heart sounds: Normal heart sounds. Pulmonary:      Effort: Pulmonary effort is normal.      Breath sounds: Normal breath sounds.    Musculoskeletal: General: Swelling and tenderness present. No signs of injury. Right hand: Normal.      Left hand: Swelling and tenderness present. No deformity or bony tenderness. Decreased range of motion. Decreased strength of thumb/finger opposition. Normal sensation. Normal capillary refill. Normal pulse. Cervical back: Neck supple. Right foot: Normal.      Left foot: Normal range of motion and normal capillary refill. Tenderness and bony tenderness present. No swelling or crepitus. Normal pulse. Legs:       Comments: Erythema present at base of first metatarsal    Skin:     General: Skin is warm and dry. Findings: Erythema present. No abscess, bruising, ecchymosis, rash or wound. Comments: Erythema at first metatarsal, no associated discharge   Neurological:      Mental Status: She is alert and oriented to person, place, and time. Psychiatric:         Mood and Affect: Mood normal.         Behavior: Behavior normal. Behavior is cooperative. Thought Content:  Thought content normal.         Judgment: Judgment normal.

## 2023-08-01 NOTE — PATIENT INSTRUCTIONS
Take naproxen as directed for next week and continue other medications as previously prescribed. Apply nasal saline daily to prevent nosebleeds and use nasal afrin when nosebleeds occur. Follow-up with PCP in 3-5 days if no improvement of symptoms. Report to the ER if symptoms worsen.

## 2023-08-21 DIAGNOSIS — E11.29 TYPE 2 DIABETES MELLITUS WITH MICROALBUMINURIA, WITHOUT LONG-TERM CURRENT USE OF INSULIN (HCC): ICD-10-CM

## 2023-08-21 DIAGNOSIS — R80.9 TYPE 2 DIABETES MELLITUS WITH MICROALBUMINURIA, WITHOUT LONG-TERM CURRENT USE OF INSULIN (HCC): ICD-10-CM

## 2023-08-21 RX ORDER — NATEGLINIDE 60 MG/1
60 TABLET ORAL 2 TIMES DAILY WITH MEALS
Qty: 60 TABLET | Refills: 1 | Status: SHIPPED | OUTPATIENT
Start: 2023-08-21

## 2023-08-29 ENCOUNTER — OFFICE VISIT (OUTPATIENT)
Dept: FAMILY MEDICINE CLINIC | Facility: CLINIC | Age: 83
End: 2023-08-29
Payer: COMMERCIAL

## 2023-08-29 VITALS
BODY MASS INDEX: 24.27 KG/M2 | HEART RATE: 73 BPM | OXYGEN SATURATION: 97 % | DIASTOLIC BLOOD PRESSURE: 84 MMHG | TEMPERATURE: 97.4 F | HEIGHT: 63 IN | SYSTOLIC BLOOD PRESSURE: 140 MMHG | WEIGHT: 137 LBS

## 2023-08-29 DIAGNOSIS — Z00.00 MEDICARE ANNUAL WELLNESS VISIT, SUBSEQUENT: ICD-10-CM

## 2023-08-29 DIAGNOSIS — E61.1 IRON DEFICIENCY: ICD-10-CM

## 2023-08-29 DIAGNOSIS — R80.9 TYPE 2 DIABETES MELLITUS WITH MICROALBUMINURIA, WITHOUT LONG-TERM CURRENT USE OF INSULIN (HCC): Primary | ICD-10-CM

## 2023-08-29 DIAGNOSIS — E11.29 TYPE 2 DIABETES MELLITUS WITH MICROALBUMINURIA, WITHOUT LONG-TERM CURRENT USE OF INSULIN (HCC): Primary | ICD-10-CM

## 2023-08-29 DIAGNOSIS — M1A.0390 CHRONIC GOUT OF WRIST, UNSPECIFIED CAUSE, UNSPECIFIED LATERALITY: ICD-10-CM

## 2023-08-29 DIAGNOSIS — E83.52 HYPERCALCEMIA: ICD-10-CM

## 2023-08-29 DIAGNOSIS — E78.2 HYPERLIPIDEMIA, MIXED: ICD-10-CM

## 2023-08-29 DIAGNOSIS — I10 PRIMARY HYPERTENSION: ICD-10-CM

## 2023-08-29 LAB — SL AMB POCT HEMOGLOBIN AIC: 7.1 (ref ?–6.5)

## 2023-08-29 PROCEDURE — G0439 PPPS, SUBSEQ VISIT: HCPCS | Performed by: FAMILY MEDICINE

## 2023-08-29 PROCEDURE — 83036 HEMOGLOBIN GLYCOSYLATED A1C: CPT | Performed by: FAMILY MEDICINE

## 2023-08-29 PROCEDURE — 92250 FUNDUS PHOTOGRAPHY W/I&R: CPT | Performed by: FAMILY MEDICINE

## 2023-08-29 PROCEDURE — 99214 OFFICE O/P EST MOD 30 MIN: CPT | Performed by: FAMILY MEDICINE

## 2023-08-29 RX ORDER — ALLOPURINOL 100 MG/1
100 TABLET ORAL DAILY
Qty: 30 TABLET | Refills: 1 | Status: SHIPPED | OUTPATIENT
Start: 2023-08-29

## 2023-08-29 NOTE — PATIENT INSTRUCTIONS
Medicare Preventive Visit Patient Instructions  Thank you for completing your Welcome to Medicare Visit or Medicare Annual Wellness Visit today. Your next wellness visit will be due in one year (8/29/2024). The screening/preventive services that you may require over the next 5-10 years are detailed below. Some tests may not apply to you based off risk factors and/or age. Screening tests ordered at today's visit but not completed yet may show as past due. Also, please note that scanned in results may not display below. Preventive Screenings:  Service Recommendations Previous Testing/Comments   Colorectal Cancer Screening  * Colonoscopy    * Fecal Occult Blood Test (FOBT)/Fecal Immunochemical Test (FIT)  * Fecal DNA/Cologuard Test  * Flexible Sigmoidoscopy Age: 43-73 years old   Colonoscopy: every 10 years (may be performed more frequently if at higher risk)  OR  FOBT/FIT: every 1 year  OR  Cologuard: every 3 years  OR  Sigmoidoscopy: every 5 years  Screening may be recommended earlier than age 39 if at higher risk for colorectal cancer. Also, an individualized decision between you and your healthcare provider will decide whether screening between the ages of 77-80 would be appropriate. Colonoscopy: Not on file  FOBT/FIT: Not on file  Cologuard: Not on file  Sigmoidoscopy: Not on file    Screening Not Indicated     Breast Cancer Screening Age: 36 years old  Frequency: every 1-2 years  Not required if history of left and right mastectomy Mammogram: Not on file    Screening Not Indicated   Cervical Cancer Screening Between the ages of 21-29, pap smear recommended once every 3 years. Between the ages of 32-69, can perform pap smear with HPV co-testing every 5 years.    Recommendations may differ for women with a history of total hysterectomy, cervical cancer, or abnormal pap smears in past. Pap Smear: Not on file    Screening Not Indicated   Hepatitis C Screening Once for adults born between [address] [place] [address] frequently in patients at high risk for Hepatitis C Hep C Antibody: Not on file    Screening Not Indicated   Diabetes Screening 1-2 times per year if you're at risk for diabetes or have pre-diabetes Fasting glucose: 180 mg/dL (4/17/2023)  A1C: 9.1 % (4/17/2023)  Screening Not Indicated  History Diabetes   Cholesterol Screening Once every 5 years if you don't have a lipid disorder. May order more often based on risk factors. Lipid panel: 04/17/2023    Screening Not Indicated  History Lipid Disorder     Other Preventive Screenings Covered by Medicare:  1. Abdominal Aortic Aneurysm (AAA) Screening: covered once if your at risk. You're considered to be at risk if you have a family history of AAA. 2. Lung Cancer Screening: covers low dose CT scan once per year if you meet all of the following conditions: (1) Age 48-67; (2) No signs or symptoms of lung cancer; (3) Current smoker or have quit smoking within the last 15 years; (4) You have a tobacco smoking history of at least 20 pack years (packs per day multiplied by number of years you smoked); (5) You get a written order from a healthcare provider. 3. Glaucoma Screening: covered annually if you're considered high risk: (1) You have diabetes OR (2) Family history of glaucoma OR (3)  aged 48 and older OR (3)  American aged 72 and older  3. Osteoporosis Screening: covered every 2 years if you meet one of the following conditions: (1) You're estrogen deficient and at risk for osteoporosis based off medical history and other findings; (2) Have a vertebral abnormality; (3) On glucocorticoid therapy for more than 3 months; (4) Have primary hyperparathyroidism; (5) On osteoporosis medications and need to assess response to drug therapy. · Last bone density test (DXA Scan): 08/31/2021.  5. HIV Screening: covered annually if you're between the age of 15-65.  Also covered annually if you are younger than 13 and older than 72 with risk factors for HIV infection. For pregnant patients, it is covered up to 3 times per pregnancy. Immunizations:  Immunization Recommendations   Influenza Vaccine Annual influenza vaccination during flu season is recommended for all persons aged >= 6 months who do not have contraindications   Pneumococcal Vaccine   * Pneumococcal conjugate vaccine = PCV13 (Prevnar 13), PCV15 (Vaxneuvance), PCV20 (Prevnar 20)  * Pneumococcal polysaccharide vaccine = PPSV23 (Pneumovax) Adults 20-63 years old: 1-3 doses may be recommended based on certain risk factors  Adults 72 years old: 1-2 doses may be recommended based off what pneumonia vaccine you previously received   Hepatitis B Vaccine 3 dose series if at intermediate or high risk (ex: diabetes, end stage renal disease, liver disease)   Tetanus (Td) Vaccine - COST NOT COVERED BY MEDICARE PART B Following completion of primary series, a booster dose should be given every 10 years to maintain immunity against tetanus. Td may also be given as tetanus wound prophylaxis. Tdap Vaccine - COST NOT COVERED BY MEDICARE PART B Recommended at least once for all adults. For pregnant patients, recommended with each pregnancy. Shingles Vaccine (Shingrix) - COST NOT COVERED BY MEDICARE PART B  2 shot series recommended in those aged 48 and above     Health Maintenance Due:  There are no preventive care reminders to display for this patient. Immunizations Due:      Topic Date Due   • COVID-19 Vaccine (1) Never done   • Pneumococcal Vaccine: 65+ Years (1 - PCV) Never done   • Influenza Vaccine (1) 09/01/2023     Advance Directives   What are advance directives? Advance directives are legal documents that state your wishes and plans for medical care. These plans are made ahead of time in case you lose your ability to make decisions for yourself. Advance directives can apply to any medical decision, such as the treatments you want, and if you want to donate organs.    What are the types of advance directives? There are many types of advance directives, and each state has rules about how to use them. You may choose a combination of any of the following:  · Living will: This is a written record of the treatment you want. You can also choose which treatments you do not want, which to limit, and which to stop at a certain time. This includes surgery, medicine, IV fluid, and tube feedings. · Durable power of  for healthcare [facility]): This is a written record that states who you want to make healthcare choices for you when you are unable to make them for yourself. This person, called a proxy, is usually a family member or a friend. You may choose more than 1 proxy. · Do not resuscitate (DNR) order:  A DNR order is used in case your heart stops beating or you stop breathing. It is a request not to have certain forms of treatment, such as CPR. A DNR order may be included in other types of advance directives. · Medical directive: This covers the care that you want if you are in a coma, near death, or unable to make decisions for yourself. You can list the treatments you want for each condition. Treatment may include pain medicine, surgery, blood transfusions, dialysis, IV or tube feedings, and a ventilator (breathing machine). · Values history: This document has questions about your views, beliefs, and how you feel and think about life. This information can help others choose the care that you would choose. Why are advance directives important? An advance directive helps you control your care. Although spoken wishes may be used, it is better to have your wishes written down. Spoken wishes can be misunderstood, or not followed. Treatments may be given even if you do not want them. An advance directive may make it easier for your family to make difficult choices about your care.        © Copyright [address] 2018 Information is for End User's use only and may not be sold, redistributed or otherwise used for commercial purposes.  All illustrations and images included in CareNotes® are the copyrighted property of [employer] or [address] [place] [address]

## 2023-08-29 NOTE — PROGRESS NOTES
Assessment and Plan:     Problem List Items Addressed This Visit        Endocrine    Type 2 diabetes mellitus with microalbuminuria, without long-term current use of insulin (HCC) - Primary    Relevant Orders    POCT hemoglobin A1c (Completed)    IRIS Diabetic eye exam    Hemoglobin A1C    Comprehensive metabolic panel    CBC and differential    Albumin / creatinine urine ratio    Lipid Panel with Direct LDL reflex       Cardiovascular and Mediastinum    Hypertension    Relevant Orders    Comprehensive metabolic panel    Lipid Panel with Direct LDL reflex       Other    Iron deficiency    Relevant Orders    CBC and differential    Iron Panel (Includes Ferritin, Iron Sat%, Iron, and TIBC)    Hyperlipidemia, mixed    Relevant Orders    Comprehensive metabolic panel    Lipid Panel with Direct LDL reflex    Hypercalcemia    Relevant Orders    Comprehensive metabolic panel    PTH, intact    Vitamin D 25 hydroxy    Phosphorus    Gout    Relevant Medications    allopurinol (ZYLOPRIM) 100 mg tablet    Other Relevant Orders    Uric acid   Other Visit Diagnoses     Medicare annual wellness visit, subsequent            DM: A1c 7.1% (from 9.1) -- much improved, continue current regimen   Foot exam as below   Eye exam collected   HTN: Borderline in office -- encouraged to monitor at home x1 week and if consistently >140/90 to call   HLD: Update lipids prior to next visit   HyperCa: Asymptomatic; update labs prior to next visit   Gout: Trial Allopurinol for prevention -- reviewed possible ADRs including GI upset; check uric acid with next labs -- to call if not tolerating        Preventive health issues were discussed with patient, and age appropriate screening tests were ordered as noted in patient's After Visit Summary. Personalized health advice and appropriate referrals for health education or preventive services given if needed, as noted in patient's After Visit Summary.      History of Present Illness:     Patient presents for chronic follow up and a Medicare Wellness Visit    HPI     DM: Home sugars 100-120s; denies hypoglycemic symptoms   HTN: Home BPs none   HLD: Not on statin   HyperCa: Established with Endo, monitoring as pt is asymptomatic     Having gout in her wrists -- does improve with Colchicine but continues to recur     Patient Care Team:  Kimber Morales DO as PCP - General (Family Medicine)  Keerthi Perea as PCP - Endocrinology (Endocrinology)     Review of Systems:     Review of Systems   Constitutional: Negative for diaphoresis and unexpected weight change. HENT: Negative for congestion, ear pain, rhinorrhea and sore throat. Eyes: Negative for visual disturbance. Respiratory: Negative for cough and shortness of breath. Cardiovascular: Negative for chest pain, palpitations and leg swelling. Gastrointestinal: Negative for abdominal pain, constipation and diarrhea. Endocrine: Negative for polyuria. Genitourinary: Negative for dysuria. Musculoskeletal: Positive for arthralgias and joint swelling. Neurological: Negative for dizziness, tremors and headaches. Psychiatric/Behavioral: Negative for sleep disturbance.         Problem List:     Patient Active Problem List   Diagnosis   • Type 2 diabetes mellitus with microalbuminuria, without long-term current use of insulin (HCC)   • Hypertension   • Hyperlipidemia, mixed   • Gout   • Torticollis   • Chronic neck pain   • Hypercalcemia   • Iron deficiency      Past Medical and Surgical History:     Past Medical History:   Diagnosis Date   • Diabetes mellitus ([address])    • Hypertension    • Osteoporosis     pt denies on PT eval 20     Past Surgical History:   Procedure Laterality Date   •  SECTION        Family History:     Family History   Problem Relation Age of Onset   • Diabetes Mother    • COPD Mother    • Substance Abuse Mother       Social History:     Social History     Socioeconomic History   • Marital status: Unknown Spouse name: None   • Number of children: None   • Years of education: None   • Highest education level: None   Occupational History   • Occupation: retired from [employer] Use   • Smoking status: Never   • Smokeless tobacco: Never   Vaping Use   • Vaping Use: Never used   Substance and Sexual Activity   • Alcohol use: Never   • Drug use: Never   • Sexual activity: Not Currently   Other Topics Concern   • None   Social History Narrative   • None     Social Determinants of Health     Financial Resource Strain: Low Risk  (8/29/2023)    Overall Financial Resource Strain (CARDIA)    • Difficulty of Paying Living Expenses: Not very hard   Food Insecurity: Not on file   Transportation Needs: No Transportation Needs (8/29/2023)    PRAPARE - Transportation    • Lack of Transportation (Medical): No    • Lack of Transportation (Non-Medical):  No   Physical Activity: Not on file   Stress: Not on file   Social Connections: Not on file   Intimate Partner Violence: Not on file   Housing Stability: Not on file      Medications and Allergies:     Current Outpatient Medications   Medication Sig Dispense Refill   • allopurinol (ZYLOPRIM) 100 mg tablet Take 1 tablet (100 mg total) by mouth daily 30 tablet 1   • aspirin 81 mg chewable tablet Chew 81 mg daily     • lisinopril (ZESTRIL) 20 mg tablet TAKE 1 TABLET (20 MG TOTAL) BY MOUTH DAILY 90 tablet 1   • metFORMIN (GLUCOPHAGE) 1000 MG tablet TAKE 1 TABLET (1,000 MG TOTAL) BY MOUTH 2 (TWO) TIMES A DAY WITH MEALS 180 tablet 1   • nateglinide (STARLIX) 60 mg tablet TAKE 1 TABLET (60 MG TOTAL) BY MOUTH 2 (TWO) TIMES A DAY WITH MEALS 60 tablet 1   • Alcohol Swabs (Pharmacist Choice Alcohol) PADS Use as directed     • B Complex Vitamins (VITAMIN B COMPLEX PO) Take by mouth     • colchicine (COLCRYS) 0.6 mg tablet TAKE 2 TABLETS BY MOUTH, THEN 1 HOUR LATER TAKE 1 TABLET BY MOUTH AS NEEDED FOR ACUTE GOUT FLARE 15 tablet 5   • ferrous sulfate 324 (65 Fe) mg TAKE 1 TABLET (324 MG TOTAL) BY MOUTH DAILY BEFORE BREAKFAST 90 tablet 1   • glucose blood test strip MEDICARE B     • Lancet Devices (Adjustable Lancing Device) MISC MEDICARE B 1 each 1   • [employer] FINEPOINT LANCETS MISC by Does not apply route daily 100 each 3   • Misc Natural Products (Osteo Bi-Flex/5-Loxin Advanced) TABS Take by mouth     • OneTouch Verio test strip USE TO TEST BLOOD SUGAR DAILY AS DIRECTED 100 each 3     No current facility-administered medications for this visit. No Known Allergies   Immunizations:     Immunization History   Administered Date(s) Administered   • Influenza, high dose seasonal 0.7 mL 10/24/2022      Health Maintenance: There are no preventive care reminders to display for this patient. Topic Date Due   • COVID-19 Vaccine (1) Never done   • Pneumococcal Vaccine: 65+ Years (1 - PCV) Never done   • Influenza Vaccine (1) 09/01/2023      Medicare Screening Tests and Risk Assessments:     Ericka Sheppard is here for her Subsequent Wellness visit. Health Risk Assessment:   Patient rates overall health as fair. Patient feels that their physical health rating is same. Patient is very satisfied with their life. Eyesight was rated as same. Hearing was rated as same. Patient feels that their emotional and mental health rating is same. Patients states they are never, rarely angry. Patient states they are never, rarely unusually tired/fatigued. Pain experienced in the last 7 days has been none. Patient states that she has experienced no weight loss or gain in last 6 months. Depression Screening:   PHQ-2 Score: 0      Fall Risk Screening: In the past year, patient has experienced: no history of falling in past year      Urinary Incontinence Screening:   Patient has not leaked urine accidently in the last six months. Home Safety:  Patient does not have trouble with stairs inside or outside of their home. Patient has working smoke alarms and has no working carbon monoxide detector.  Home safety hazards include: none. Nutrition:   Current diet is Regular and Diabetic. Medications:   Patient is currently taking over-the-counter supplements. OTC medications include: see medication list. Patient is able to manage medications. Activities of Daily Living (ADLs)/Instrumental Activities of Daily Living (IADLs):   Walk and transfer into and out of bed and chair?: Yes  Dress and groom yourself?: Yes    Bathe or shower yourself?: Yes    Feed yourself?  Yes  Do your laundry/housekeeping?: Yes  Manage your money, pay your bills and track your expenses?: Yes  Make your own meals?: Yes    Do your own shopping?: Yes    Durable Medical Equipment Suppliers  N/A    Previous Hospitalizations:   Any hospitalizations or ED visits within the last 12 months?: No      Advance Care Planning:   Living will: No    Durable POA for healthcare: No      Comments:   Has [employer] at home     Cognitive Screening:   Provider or family/friend/caregiver concerned regarding cognition?: No    PREVENTIVE SCREENINGS      Cardiovascular Screening:    General: Screening Not Indicated and History Lipid Disorder      Diabetes Screening:     General: Screening Not Indicated and History Diabetes      Colorectal Cancer Screening:     General: Screening Not Indicated      Breast Cancer Screening:     General: Screening Not Indicated      Cervical Cancer Screening:    General: Screening Not Indicated      Osteoporosis Screening:    General: Screening Not Indicated, History Osteoporosis and Patient Declines    Due for: DXA Appendicular      Abdominal Aortic Aneurysm (AAA) Screening:        General: Screening Not Indicated      Lung Cancer Screening:     General: Screening Not Indicated      Hepatitis C Screening:    General: Screening Not Indicated    Screening, Brief Intervention, and Referral to Treatment (SBIRT)    Screening  Typical number of drinks in a day: 0  Typical number of drinks in a week: 0  Interpretation: Low risk drinking behavior. Single Item Drug Screening:  How often have you used an illegal drug (including marijuana) or a prescription medication for non-medical reasons in the past year? never    Single Item Drug Screen Score: 0  Interpretation: Negative screen for possible drug use disorder    No results found. Physical Exam:     /84 (BP Location: Left arm, Patient Position: Sitting, Cuff Size: Standard)   Pulse 73   Temp (!) 97.4 °F (36.3 °C)   Ht 5' 3" (1.6 m)   Wt 62.1 kg (137 lb)   SpO2 97%   BMI 24.27 kg/m²     Physical Exam  Vitals and nursing note reviewed. Constitutional:       General: She is not in acute distress. Appearance: She is well-developed. HENT:      Head: Normocephalic and atraumatic. Right Ear: Tympanic membrane, ear canal and external ear normal.      Left Ear: Tympanic membrane, ear canal and external ear normal.      Nose: Nose normal. No rhinorrhea. Mouth/Throat:      Mouth: Mucous membranes are moist.      Pharynx: No oropharyngeal exudate or posterior oropharyngeal erythema. Eyes:      Conjunctiva/sclera: Conjunctivae normal.   Neck:      Thyroid: No thyromegaly. Cardiovascular:      Rate and Rhythm: Normal rate and regular rhythm. Pulses: no weak pulses          Dorsalis pedis pulses are 2+ on the right side and 2+ on the left side. Pulmonary:      Effort: Pulmonary effort is normal. No respiratory distress. Breath sounds: Normal breath sounds. Abdominal:      General: Bowel sounds are normal. There is no distension. Palpations: Abdomen is soft. Tenderness: There is no abdominal tenderness. Musculoskeletal:         General: Normal range of motion. Feet:      Right foot:      Skin integrity: Dry skin present. No callus. Left foot:      Skin integrity: Dry skin present. No callus. Lymphadenopathy:      Cervical: No cervical adenopathy. Skin:     General: Skin is warm and dry. Neurological:      Mental Status: She is alert. Comments: Grossly intact   Psychiatric:         Mood and Affect: Mood normal.          Patient's shoes and socks removed. Right Foot/Ankle   Right Foot Inspection  Skin Exam: dry skin. No callus and no callus. Toe Exam:  no right toe deformity    Sensory   Vibration: intact  Monofilament testing: intact    Vascular  Capillary refills: < 3 seconds  The right DP pulse is 2+. Left Foot/Ankle  Left Foot Inspection  Skin Exam: dry skin. No callus. Toe Exam: No left toe deformity. Sensory   Vibration: intact  Monofilament testing: intact    Vascular  Capillary refills: < 3 seconds  The left DP pulse is 2+.      Assign Risk Category  No deformity present  No loss of protective sensation  No weak pulses  Risk: 0        Raquel Rocha DO

## 2023-09-14 DIAGNOSIS — E11.29 TYPE 2 DIABETES MELLITUS WITH MICROALBUMINURIA, WITHOUT LONG-TERM CURRENT USE OF INSULIN: ICD-10-CM

## 2023-09-14 DIAGNOSIS — R80.9 TYPE 2 DIABETES MELLITUS WITH MICROALBUMINURIA, WITHOUT LONG-TERM CURRENT USE OF INSULIN: ICD-10-CM

## 2023-09-15 DIAGNOSIS — E61.1 IRON DEFICIENCY: ICD-10-CM

## 2023-09-15 RX ORDER — LANOLIN ALCOHOL/MO/W.PET/CERES
1 CREAM (GRAM) TOPICAL
Qty: 90 TABLET | Refills: 1 | Status: SHIPPED | OUTPATIENT
Start: 2023-09-15

## 2023-09-21 DIAGNOSIS — M1A.4710 OTHER SECONDARY CHRONIC GOUT OF RIGHT ANKLE WITHOUT TOPHUS: ICD-10-CM

## 2023-09-21 RX ORDER — COLCHICINE 0.6 MG/1
TABLET ORAL
Qty: 15 TABLET | Refills: 5 | Status: SHIPPED | OUTPATIENT
Start: 2023-09-21

## 2023-09-23 DIAGNOSIS — M1A.0390 CHRONIC GOUT OF WRIST, UNSPECIFIED CAUSE, UNSPECIFIED LATERALITY: ICD-10-CM

## 2023-09-25 RX ORDER — ALLOPURINOL 100 MG/1
100 TABLET ORAL DAILY
Qty: 30 TABLET | Refills: 1 | Status: SHIPPED | OUTPATIENT
Start: 2023-09-25

## 2023-10-31 DIAGNOSIS — E11.29 TYPE 2 DIABETES MELLITUS WITH MICROALBUMINURIA, WITHOUT LONG-TERM CURRENT USE OF INSULIN: ICD-10-CM

## 2023-10-31 DIAGNOSIS — R80.9 TYPE 2 DIABETES MELLITUS WITH MICROALBUMINURIA, WITHOUT LONG-TERM CURRENT USE OF INSULIN: ICD-10-CM

## 2023-10-31 RX ORDER — NATEGLINIDE 60 MG/1
60 TABLET ORAL 2 TIMES DAILY WITH MEALS
Qty: 60 TABLET | Refills: 1 | Status: SHIPPED | OUTPATIENT
Start: 2023-10-31

## 2023-11-27 ENCOUNTER — APPOINTMENT (OUTPATIENT)
Dept: LAB | Facility: CLINIC | Age: 83
End: 2023-11-27
Payer: COMMERCIAL

## 2023-11-27 DIAGNOSIS — E61.1 IRON DEFICIENCY: ICD-10-CM

## 2023-11-27 DIAGNOSIS — I10 PRIMARY HYPERTENSION: ICD-10-CM

## 2023-11-27 DIAGNOSIS — E83.52 HYPERCALCEMIA: ICD-10-CM

## 2023-11-27 DIAGNOSIS — R80.9 TYPE 2 DIABETES MELLITUS WITH MICROALBUMINURIA, WITHOUT LONG-TERM CURRENT USE OF INSULIN: ICD-10-CM

## 2023-11-27 DIAGNOSIS — E11.29 TYPE 2 DIABETES MELLITUS WITH MICROALBUMINURIA, WITHOUT LONG-TERM CURRENT USE OF INSULIN: ICD-10-CM

## 2023-11-27 DIAGNOSIS — E78.2 HYPERLIPIDEMIA, MIXED: ICD-10-CM

## 2023-11-27 DIAGNOSIS — M1A.0390 CHRONIC GOUT OF WRIST, UNSPECIFIED CAUSE, UNSPECIFIED LATERALITY: ICD-10-CM

## 2023-11-27 LAB
25(OH)D3 SERPL-MCNC: 42.7 NG/ML (ref 30–100)
ALBUMIN SERPL BCP-MCNC: 3.7 G/DL (ref 3.5–5)
ALP SERPL-CCNC: 57 U/L (ref 34–104)
ALT SERPL W P-5'-P-CCNC: 12 U/L (ref 7–52)
ANION GAP SERPL CALCULATED.3IONS-SCNC: 8 MMOL/L
AST SERPL W P-5'-P-CCNC: 16 U/L (ref 13–39)
BASOPHILS # BLD AUTO: 0.07 THOUSANDS/ÂΜL (ref 0–0.1)
BASOPHILS NFR BLD AUTO: 1 % (ref 0–1)
BILIRUB SERPL-MCNC: 0.31 MG/DL (ref 0.2–1)
BUN SERPL-MCNC: 18 MG/DL (ref 5–25)
CALCIUM SERPL-MCNC: 10.5 MG/DL (ref 8.4–10.2)
CHLORIDE SERPL-SCNC: 104 MMOL/L (ref 96–108)
CHOLEST SERPL-MCNC: 143 MG/DL
CO2 SERPL-SCNC: 30 MMOL/L (ref 21–32)
CREAT SERPL-MCNC: 0.69 MG/DL (ref 0.6–1.3)
CREAT UR-MCNC: 160.8 MG/DL
EOSINOPHIL # BLD AUTO: 0.15 THOUSAND/ÂΜL (ref 0–0.61)
EOSINOPHIL NFR BLD AUTO: 2 % (ref 0–6)
ERYTHROCYTE [DISTWIDTH] IN BLOOD BY AUTOMATED COUNT: 14.8 % (ref 11.6–15.1)
EST. AVERAGE GLUCOSE BLD GHB EST-MCNC: 177 MG/DL
FERRITIN SERPL-MCNC: 64 NG/ML (ref 11–307)
GFR SERPL CREATININE-BSD FRML MDRD: 81 ML/MIN/1.73SQ M
GLUCOSE P FAST SERPL-MCNC: 113 MG/DL (ref 65–99)
HBA1C MFR BLD: 7.8 %
HCT VFR BLD AUTO: 35.9 % (ref 34.8–46.1)
HDLC SERPL-MCNC: 58 MG/DL
HGB BLD-MCNC: 11 G/DL (ref 11.5–15.4)
IMM GRANULOCYTES # BLD AUTO: 0.02 THOUSAND/UL (ref 0–0.2)
IMM GRANULOCYTES NFR BLD AUTO: 0 % (ref 0–2)
IRON SATN MFR SERPL: 15 % (ref 15–50)
IRON SERPL-MCNC: 41 UG/DL (ref 50–212)
LDLC SERPL CALC-MCNC: 72 MG/DL (ref 0–100)
LYMPHOCYTES # BLD AUTO: 1.76 THOUSANDS/ÂΜL (ref 0.6–4.47)
LYMPHOCYTES NFR BLD AUTO: 27 % (ref 14–44)
MCH RBC QN AUTO: 28.1 PG (ref 26.8–34.3)
MCHC RBC AUTO-ENTMCNC: 30.6 G/DL (ref 31.4–37.4)
MCV RBC AUTO: 92 FL (ref 82–98)
MICROALBUMIN UR-MCNC: 68.7 MG/L
MICROALBUMIN/CREAT 24H UR: 43 MG/G CREATININE (ref 0–30)
MONOCYTES # BLD AUTO: 0.41 THOUSAND/ÂΜL (ref 0.17–1.22)
MONOCYTES NFR BLD AUTO: 6 % (ref 4–12)
NEUTROPHILS # BLD AUTO: 4.16 THOUSANDS/ÂΜL (ref 1.85–7.62)
NEUTS SEG NFR BLD AUTO: 64 % (ref 43–75)
NRBC BLD AUTO-RTO: 0 /100 WBCS
PHOSPHATE SERPL-MCNC: 3.2 MG/DL (ref 2.3–4.1)
PLATELET # BLD AUTO: 359 THOUSANDS/UL (ref 149–390)
PMV BLD AUTO: 9.2 FL (ref 8.9–12.7)
POTASSIUM SERPL-SCNC: 4.1 MMOL/L (ref 3.5–5.3)
PROT SERPL-MCNC: 7.3 G/DL (ref 6.4–8.4)
PTH-INTACT SERPL-MCNC: 24.2 PG/ML (ref 12–88)
RBC # BLD AUTO: 3.91 MILLION/UL (ref 3.81–5.12)
SODIUM SERPL-SCNC: 142 MMOL/L (ref 135–147)
TIBC SERPL-MCNC: 275 UG/DL (ref 250–450)
TRIGL SERPL-MCNC: 65 MG/DL
UIBC SERPL-MCNC: 234 UG/DL (ref 155–355)
URATE SERPL-MCNC: 3.3 MG/DL (ref 2–7.5)
WBC # BLD AUTO: 6.57 THOUSAND/UL (ref 4.31–10.16)

## 2023-11-27 PROCEDURE — 36415 COLL VENOUS BLD VENIPUNCTURE: CPT

## 2023-11-27 PROCEDURE — 84550 ASSAY OF BLOOD/URIC ACID: CPT

## 2023-11-27 PROCEDURE — 85025 COMPLETE CBC W/AUTO DIFF WBC: CPT

## 2023-11-27 PROCEDURE — 83036 HEMOGLOBIN GLYCOSYLATED A1C: CPT

## 2023-11-27 PROCEDURE — 83540 ASSAY OF IRON: CPT

## 2023-11-27 PROCEDURE — 82728 ASSAY OF FERRITIN: CPT

## 2023-11-27 PROCEDURE — 82570 ASSAY OF URINE CREATININE: CPT

## 2023-11-27 PROCEDURE — 84100 ASSAY OF PHOSPHORUS: CPT

## 2023-11-27 PROCEDURE — 82043 UR ALBUMIN QUANTITATIVE: CPT

## 2023-11-27 PROCEDURE — 83970 ASSAY OF PARATHORMONE: CPT

## 2023-11-27 PROCEDURE — 80053 COMPREHEN METABOLIC PANEL: CPT

## 2023-11-27 PROCEDURE — 83550 IRON BINDING TEST: CPT

## 2023-11-27 PROCEDURE — 82306 VITAMIN D 25 HYDROXY: CPT

## 2023-11-27 PROCEDURE — 80061 LIPID PANEL: CPT

## 2023-12-04 DIAGNOSIS — M1A.0390 CHRONIC GOUT OF WRIST, UNSPECIFIED CAUSE, UNSPECIFIED LATERALITY: ICD-10-CM

## 2023-12-04 RX ORDER — ALLOPURINOL 100 MG/1
100 TABLET ORAL DAILY
Qty: 30 TABLET | Refills: 1 | Status: SHIPPED | OUTPATIENT
Start: 2023-12-04

## 2023-12-06 NOTE — PROGRESS NOTES
Shelli Vela 1940 female MRN: [id number]      ASSESSMENT/PLAN  Problem List Items Addressed This Visit        Endocrine    Type 2 diabetes mellitus with microalbuminuria, without long-term current use of insulin  - Primary       Cardiovascular and Mediastinum    Hypertension       Other    Iron deficiency    Relevant Orders    Occult Blood, Fecal Immunochemical    Hyperlipidemia, mixed    Hypercalcemia    Gout   Other Visit Diagnoses     Encounter for immunization        Relevant Orders    influenza vaccine, high-dose, PF 0.7 mL (FLUZONE HIGH-DOSE) (Completed)    Right hip pain        Relevant Orders    XR hip/pelv 2-3 vws right if performed    Ambulatory referral to Orthopedic Surgery    Dysfunction of right eustachian tube        Relevant Medications    fluticasone (FLONASE) 50 mcg/act nasal spray        DM: A1c above goal, though was previously well controlled -- encouraged to modify diet and will f/u in 3 months to monitor  HTN: BP borderline -- encouraged to monitor daily at home and call if persistently above goal   HLD: Within goal without statin   Gout: Much improved with Allopurinol, continue   Hypercalcemia: Stable, f/u with Endo as scheduled     Denies any blood in urine/stool, will check FIT given persistent anemia on iron supplement    Trial flonase for R eustachian tube dysfunction, likely secondary to recent URI   Suspect trochanteric bursitis given location of hip pain -- will XR to evaluate for bony changes and refer to Ortho to discuss possible injections     Flu vaccine given    Future Appointments   Date Time Provider [facility] [address]   3/14/2024  2:00 PM DO [facility]            SUBJECTIVE  CC: Follow-up, Ear Fullness (Right ear fullness), and Hip Pain (Right hip pain)      HPI:  Shelli Vela is a 82 y.o. female who presents with her niece for chronic follow up and lab review.      DM: A1c 7.8% (from 7.1), (+) microalbuminuria; home sugars none, denies hypoglycemic symptoms     HTN: Home BPs "once in awhile"   HLD: Lipids: Total 143, LDL 72, HDL 58, TG 65; LFTs WNL   Gout: Initiated on Allopurinol; Uric acid 3.3 -- does note an improvement in flare ups   Hypercalcemia: Following with Endo, monitoring as pt is asymptomatic; Ca 10.5, PTH 24, D 42, Phos 3.2     Hb 11 (stable); Iron 41, Ferritin 64 -- denies any blood in urine/stools   Cr 0.69/GFR 81    Had a fall about 2 weeks ago (tripped) and hit her head -- had "a bit knot and a little knot" on her forehead and had two black eyes. No headaches, dizziness, vision changes since. R hip pain -- "aches like crazy", hurts to lay on it       Review of Systems   HENT:          (+) R ear -- feels like there is water in it, decreased hearing; started while she had URI   Eyes:  Negative for visual disturbance. Respiratory:  Negative for cough and shortness of breath. Cardiovascular:  Negative for chest pain, palpitations and leg swelling. Gastrointestinal:  Negative for abdominal pain, blood in stool, constipation and diarrhea. Endocrine: Negative for polyuria. Genitourinary:  Negative for dysuria, hematuria and vaginal bleeding. Musculoskeletal:  Positive for arthralgias. Neurological:  Negative for dizziness and headaches. Psychiatric/Behavioral:  Negative for sleep disturbance.         Historical Information   The patient history was reviewed and updated as follows:    Past Medical History:   Diagnosis Date   • Diabetes mellitus ([address])    • Hypertension    • Osteoporosis     pt denies on PT eval 20     Past Surgical History:   Procedure Laterality Date   •  SECTION       Family History   Problem Relation Age of Onset   • Diabetes Mother    • COPD Mother    • Substance Abuse Mother       Social History   Social History     Substance and Sexual Activity   Alcohol Use Never     Social History     Substance and Sexual Activity   Drug Use Never     Social History     Tobacco Use   Smoking Status Never   Smokeless Tobacco Never       Medications:     Current Outpatient Medications:   •  fluticasone (FLONASE) 50 mcg/act nasal spray, 1 spray into each nostril daily, Disp: 16 g, Rfl: 1  •  Alcohol Swabs (Pharmacist Choice Alcohol) PADS, Use as directed, Disp: , Rfl:   •  allopurinol (ZYLOPRIM) 100 mg tablet, TAKE 1 TABLET (100 MG TOTAL) BY MOUTH DAILY, Disp: 30 tablet, Rfl: 1  •  aspirin 81 mg chewable tablet, Chew 81 mg daily, Disp: , Rfl:   •  B Complex Vitamins (VITAMIN B COMPLEX PO), Take by mouth, Disp: , Rfl:   •  colchicine (COLCRYS) 0.6 mg tablet, TAKE 2 TABLETS BY MOUTH, THEN 1 HOUR LATER TAKE 1 TABLET BY MOUTH AS NEEDED FOR ACUTE GOUT FLARE, Disp: 15 tablet, Rfl: 5  •  ferrous sulfate 325 (65 FE) MG EC tablet, TAKE 1 TABLET (324 MG TOTAL) BY MOUTH DAILY BEFORE BREAKFAST, Disp: 90 tablet, Rfl: 1  •  glucose blood test strip, MEDICARE B, Disp: , Rfl:   •  Lancet Devices (Adjustable Lancing Device) MISC, MEDICARE B, Disp: 1 each, Rfl: 1  •  [employer] FINEPOINT LANCETS MISC, by Does not apply route daily, Disp: 100 each, Rfl: 3  •  lisinopril (ZESTRIL) 20 mg tablet, TAKE 1 TABLET (20 MG TOTAL) BY MOUTH DAILY, Disp: 90 tablet, Rfl: 1  •  metFORMIN (GLUCOPHAGE) 1000 MG tablet, TAKE 1 TABLET (1,000 MG TOTAL) BY MOUTH 2 (TWO) TIMES A DAY WITH MEALS, Disp: 180 tablet, Rfl: 1  •  [facility] Natural Products (Osteo Bi-Flex/5-Loxin Advanced) TABS, Take by mouth, Disp: , Rfl:   •  nateglinide (STARLIX) 60 mg tablet, TAKE 1 TABLET (60 MG TOTAL) BY MOUTH 2 (TWO) TIMES A DAY WITH MEALS, Disp: 60 tablet, Rfl: 1  •  OneTouch Verio test strip, USE TO TEST BLOOD SUGAR DAILY AS DIRECTED, Disp: 100 each, Rfl: 3  No Known Allergies    OBJECTIVE    Vitals:   Vitals:    12/07/23 1348   BP: 142/80   Pulse: 88   Temp: 97.7 °F (36.5 °C)   SpO2: 100%   Weight: 64.4 kg (142 lb)   Height: 5' 3" (1.6 m)           Physical Exam  Vitals and nursing note reviewed. Constitutional:       General: She is not in acute distress.      Appearance: Normal appearance. HENT:      Head: Normocephalic and atraumatic. Right Ear: Ear canal and external ear normal. A middle ear effusion (serous) is present. Left Ear: Tympanic membrane, ear canal and external ear normal.      Nose: Nose normal.      Mouth/Throat:      Mouth: Mucous membranes are moist.      Pharynx: No oropharyngeal exudate or posterior oropharyngeal erythema. Eyes:      Conjunctiva/sclera: Conjunctivae normal.   Cardiovascular:      Rate and Rhythm: Normal rate and regular rhythm. Pulmonary:      Effort: Pulmonary effort is normal. No respiratory distress. Breath sounds: Normal breath sounds. Abdominal:      General: Bowel sounds are normal. There is no distension. Palpations: Abdomen is soft. Tenderness: There is no abdominal tenderness. Musculoskeletal:      Right lower leg: No edema. Left lower leg: No edema. Legs:    Lymphadenopathy:      Cervical: No cervical adenopathy. Skin:     General: Skin is warm and dry. Neurological:      Mental Status: She is alert.       Comments: Grossly intact   Psychiatric:         Mood and Affect: Mood normal.                    Raquel Rocha DO  [facility] [address] [facility]   12/7/2023  3:09 PM

## 2023-12-07 ENCOUNTER — APPOINTMENT (OUTPATIENT)
Dept: RADIOLOGY | Facility: CLINIC | Age: 83
End: 2023-12-07
Payer: COMMERCIAL

## 2023-12-07 ENCOUNTER — OFFICE VISIT (OUTPATIENT)
Dept: FAMILY MEDICINE CLINIC | Facility: CLINIC | Age: 83
End: 2023-12-07
Payer: COMMERCIAL

## 2023-12-07 VITALS
SYSTOLIC BLOOD PRESSURE: 142 MMHG | OXYGEN SATURATION: 100 % | DIASTOLIC BLOOD PRESSURE: 80 MMHG | TEMPERATURE: 97.7 F | BODY MASS INDEX: 25.16 KG/M2 | WEIGHT: 142 LBS | HEIGHT: 63 IN | HEART RATE: 88 BPM

## 2023-12-07 DIAGNOSIS — E78.2 HYPERLIPIDEMIA, MIXED: ICD-10-CM

## 2023-12-07 DIAGNOSIS — R80.9 TYPE 2 DIABETES MELLITUS WITH MICROALBUMINURIA, WITHOUT LONG-TERM CURRENT USE OF INSULIN: Primary | ICD-10-CM

## 2023-12-07 DIAGNOSIS — E61.1 IRON DEFICIENCY: ICD-10-CM

## 2023-12-07 DIAGNOSIS — E11.29 TYPE 2 DIABETES MELLITUS WITH MICROALBUMINURIA, WITHOUT LONG-TERM CURRENT USE OF INSULIN: Primary | ICD-10-CM

## 2023-12-07 DIAGNOSIS — M25.551 RIGHT HIP PAIN: ICD-10-CM

## 2023-12-07 DIAGNOSIS — I10 PRIMARY HYPERTENSION: ICD-10-CM

## 2023-12-07 DIAGNOSIS — Z23 ENCOUNTER FOR IMMUNIZATION: ICD-10-CM

## 2023-12-07 DIAGNOSIS — H69.91 DYSFUNCTION OF RIGHT EUSTACHIAN TUBE: ICD-10-CM

## 2023-12-07 DIAGNOSIS — M1A.0390 CHRONIC GOUT OF WRIST, UNSPECIFIED CAUSE, UNSPECIFIED LATERALITY: ICD-10-CM

## 2023-12-07 DIAGNOSIS — E83.52 HYPERCALCEMIA: ICD-10-CM

## 2023-12-07 PROCEDURE — 90662 IIV NO PRSV INCREASED AG IM: CPT | Performed by: FAMILY MEDICINE

## 2023-12-07 PROCEDURE — 99214 OFFICE O/P EST MOD 30 MIN: CPT | Performed by: FAMILY MEDICINE

## 2023-12-07 PROCEDURE — 90471 IMMUNIZATION ADMIN: CPT | Performed by: FAMILY MEDICINE

## 2023-12-07 PROCEDURE — 73502 X-RAY EXAM HIP UNI 2-3 VIEWS: CPT

## 2023-12-07 RX ORDER — FLUTICASONE PROPIONATE 50 MCG
1 SPRAY, SUSPENSION (ML) NASAL DAILY
Qty: 16 G | Refills: 1 | Status: SHIPPED | OUTPATIENT
Start: 2023-12-07

## 2023-12-14 DIAGNOSIS — E61.1 IRON DEFICIENCY: ICD-10-CM

## 2023-12-14 RX ORDER — LANOLIN ALCOHOL/MO/W.PET/CERES
1 CREAM (GRAM) TOPICAL
Qty: 90 TABLET | Refills: 1 | Status: SHIPPED | OUTPATIENT
Start: 2023-12-14

## 2023-12-16 DIAGNOSIS — R80.9 TYPE 2 DIABETES MELLITUS WITH MICROALBUMINURIA, WITHOUT LONG-TERM CURRENT USE OF INSULIN: ICD-10-CM

## 2023-12-16 DIAGNOSIS — E11.29 TYPE 2 DIABETES MELLITUS WITH MICROALBUMINURIA, WITHOUT LONG-TERM CURRENT USE OF INSULIN: ICD-10-CM

## 2023-12-18 RX ORDER — NATEGLINIDE 60 MG/1
60 TABLET ORAL 2 TIMES DAILY WITH MEALS
Qty: 60 TABLET | Refills: 1 | Status: SHIPPED | OUTPATIENT
Start: 2023-12-18

## 2023-12-26 DIAGNOSIS — M1A.0390 CHRONIC GOUT OF WRIST, UNSPECIFIED CAUSE, UNSPECIFIED LATERALITY: ICD-10-CM

## 2023-12-26 RX ORDER — ALLOPURINOL 100 MG/1
100 TABLET ORAL DAILY
Qty: 30 TABLET | Refills: 1 | Status: SHIPPED | OUTPATIENT
Start: 2023-12-26

## 2024-01-01 ENCOUNTER — APPOINTMENT (EMERGENCY)
Dept: CT IMAGING | Facility: HOSPITAL | Age: 84
DRG: 853 | End: 2024-01-01
Payer: COMMERCIAL

## 2024-01-01 ENCOUNTER — APPOINTMENT (INPATIENT)
Dept: RADIOLOGY | Facility: HOSPITAL | Age: 84
DRG: 853 | End: 2024-01-01
Payer: COMMERCIAL

## 2024-01-01 ENCOUNTER — ANESTHESIA (INPATIENT)
Dept: PERIOP | Facility: HOSPITAL | Age: 84
DRG: 853 | End: 2024-01-01
Payer: COMMERCIAL

## 2024-01-01 ENCOUNTER — ANESTHESIA EVENT (INPATIENT)
Dept: PERIOP | Facility: HOSPITAL | Age: 84
DRG: 853 | End: 2024-01-01
Payer: COMMERCIAL

## 2024-01-01 ENCOUNTER — HOSPITAL ENCOUNTER (INPATIENT)
Facility: HOSPITAL | Age: 84
LOS: 4 days | DRG: 853 | End: 2024-12-27
Attending: EMERGENCY MEDICINE | Admitting: ANESTHESIOLOGY
Payer: COMMERCIAL

## 2024-01-01 ENCOUNTER — APPOINTMENT (INPATIENT)
Dept: NON INVASIVE DIAGNOSTICS | Facility: HOSPITAL | Age: 84
DRG: 853 | End: 2024-01-01
Payer: COMMERCIAL

## 2024-01-01 ENCOUNTER — APPOINTMENT (INPATIENT)
Dept: ULTRASOUND IMAGING | Facility: HOSPITAL | Age: 84
DRG: 853 | End: 2024-01-01
Payer: COMMERCIAL

## 2024-01-01 ENCOUNTER — APPOINTMENT (INPATIENT)
Dept: CT IMAGING | Facility: HOSPITAL | Age: 84
DRG: 853 | End: 2024-01-01
Payer: COMMERCIAL

## 2024-01-01 VITALS
SYSTOLIC BLOOD PRESSURE: 110 MMHG | TEMPERATURE: 97.5 F | DIASTOLIC BLOOD PRESSURE: 59 MMHG | WEIGHT: 156.97 LBS | BODY MASS INDEX: 26.15 KG/M2 | HEIGHT: 65 IN | OXYGEN SATURATION: 100 % | RESPIRATION RATE: 16 BRPM

## 2024-01-01 DIAGNOSIS — K63.1 SMALL BOWEL PERFORATION (HCC): ICD-10-CM

## 2024-01-01 DIAGNOSIS — R57.9 SHOCK (HCC): ICD-10-CM

## 2024-01-01 DIAGNOSIS — R65.20 SEVERE SEPSIS (HCC): ICD-10-CM

## 2024-01-01 DIAGNOSIS — E11.65 UNCONTROLLED DIABETES MELLITUS WITH HYPERGLYCEMIA (HCC): ICD-10-CM

## 2024-01-01 DIAGNOSIS — A41.9 SEVERE SEPSIS (HCC): ICD-10-CM

## 2024-01-01 DIAGNOSIS — R93.5 ABNORMAL CT OF THE ABDOMEN: ICD-10-CM

## 2024-01-01 DIAGNOSIS — N17.9 ACUTE KIDNEY INJURY (HCC): ICD-10-CM

## 2024-01-01 DIAGNOSIS — R53.1 WEAKNESS: Primary | ICD-10-CM

## 2024-01-01 DIAGNOSIS — K63.1 BOWEL PERFORATION (HCC): ICD-10-CM

## 2024-01-01 DIAGNOSIS — J96.01 ACUTE RESPIRATORY FAILURE WITH HYPOXIA (HCC): ICD-10-CM

## 2024-01-01 DIAGNOSIS — N17.9 ACUTE KIDNEY FAILURE (HCC): ICD-10-CM

## 2024-01-01 DIAGNOSIS — E87.5 HYPERKALEMIA: ICD-10-CM

## 2024-01-01 LAB
25(OH)D3 SERPL-MCNC: 49.4 NG/ML (ref 30–100)
ABO GROUP BLD: NORMAL
ABO GROUP BLD: NORMAL
ALBUMIN SERPL BCG-MCNC: 2.9 G/DL (ref 3.5–5)
ALBUMIN SERPL BCG-MCNC: 3.5 G/DL (ref 3.5–5)
ALBUMIN SERPL BCG-MCNC: 3.6 G/DL (ref 3.5–5)
ALBUMIN SERPL BCG-MCNC: <1.5 G/DL (ref 3.5–5)
ALP SERPL-CCNC: 31 U/L (ref 34–104)
ALP SERPL-CCNC: 32 U/L (ref 34–104)
ALP SERPL-CCNC: 56 U/L (ref 34–104)
ALP SERPL-CCNC: 82 U/L (ref 34–104)
ALT SERPL W P-5'-P-CCNC: 11 U/L (ref 7–52)
ALT SERPL W P-5'-P-CCNC: 111 U/L (ref 7–52)
ALT SERPL W P-5'-P-CCNC: 12 U/L (ref 7–52)
ALT SERPL W P-5'-P-CCNC: 50 U/L (ref 7–52)
ANION GAP SERPL CALCULATED.3IONS-SCNC: 16 MMOL/L (ref 4–13)
ANION GAP SERPL CALCULATED.3IONS-SCNC: 16 MMOL/L (ref 4–13)
ANION GAP SERPL CALCULATED.3IONS-SCNC: 18 MMOL/L (ref 4–13)
ANION GAP SERPL CALCULATED.3IONS-SCNC: 18 MMOL/L (ref 4–13)
ANION GAP SERPL CALCULATED.3IONS-SCNC: 20 MMOL/L (ref 4–13)
ANION GAP SERPL CALCULATED.3IONS-SCNC: 20 MMOL/L (ref 4–13)
ANION GAP SERPL CALCULATED.3IONS-SCNC: 21 MMOL/L (ref 4–13)
ANION GAP SERPL CALCULATED.3IONS-SCNC: 25 MMOL/L (ref 4–13)
ANION GAP SERPL CALCULATED.3IONS-SCNC: 25 MMOL/L (ref 4–13)
ANION GAP SERPL CALCULATED.3IONS-SCNC: 26 MMOL/L (ref 4–13)
ANION GAP SERPL CALCULATED.3IONS-SCNC: 27 MMOL/L (ref 4–13)
ANION GAP SERPL CALCULATED.3IONS-SCNC: 28 MMOL/L (ref 4–13)
ANION GAP SERPL CALCULATED.3IONS-SCNC: 28 MMOL/L (ref 4–13)
ANION GAP SERPL CALCULATED.3IONS-SCNC: 37 MMOL/L (ref 4–13)
ANISOCYTOSIS BLD QL SMEAR: PRESENT
AORTIC ROOT: 3.1 CM
APICAL FOUR CHAMBER EJECTION FRACTION: 52 %
ARTERIAL PATENCY WRIST A: YES
ASCENDING AORTA: 3 CM
AST SERPL W P-5'-P-CCNC: 10 U/L (ref 13–39)
AST SERPL W P-5'-P-CCNC: 23 U/L (ref 13–39)
AST SERPL W P-5'-P-CCNC: 317 U/L (ref 13–39)
AST SERPL W P-5'-P-CCNC: 419 U/L (ref 13–39)
ATRIAL RATE: 100 BPM
B-OH-BUTYR SERPL-MCNC: 4.34 MMOL/L (ref 0.02–0.27)
BACTERIA SPEC ANAEROBE CULT: ABNORMAL
BACTERIA SPEC ANAEROBE CULT: ABNORMAL
BACTERIA UR CULT: NORMAL
BACTERIA UR QL AUTO: ABNORMAL /HPF
BACTERIA UR QL AUTO: ABNORMAL /HPF
BASE EX.OXY STD BLDV CALC-SCNC: 58.5 % (ref 60–80)
BASE EX.OXY STD BLDV CALC-SCNC: 70.8 % (ref 60–80)
BASE EX.OXY STD BLDV CALC-SCNC: 74.9 % (ref 60–80)
BASE EXCESS BLDA CALC-SCNC: -0.9 MMOL/L
BASE EXCESS BLDA CALC-SCNC: -1.1 MMOL/L
BASE EXCESS BLDA CALC-SCNC: -1.3 MMOL/L
BASE EXCESS BLDA CALC-SCNC: -11 MMOL/L
BASE EXCESS BLDA CALC-SCNC: -2.6 MMOL/L
BASE EXCESS BLDA CALC-SCNC: -4.7 MMOL/L
BASE EXCESS BLDA CALC-SCNC: -6 MMOL/L
BASE EXCESS BLDA CALC-SCNC: -6.4 MMOL/L
BASE EXCESS BLDA CALC-SCNC: -6.7 MMOL/L
BASE EXCESS BLDA CALC-SCNC: -7.9 MMOL/L
BASE EXCESS BLDA CALC-SCNC: -9.5 MMOL/L
BASE EXCESS BLDV CALC-SCNC: -13.5 MMOL/L
BASE EXCESS BLDV CALC-SCNC: -8.2 MMOL/L
BASE EXCESS BLDV CALC-SCNC: 0.5 MMOL/L
BASOPHILS # BLD AUTO: 0.01 THOUSANDS/ÂΜL (ref 0–0.1)
BASOPHILS # BLD AUTO: 0.01 THOUSANDS/ÂΜL (ref 0–0.1)
BASOPHILS # BLD MANUAL: 0 THOUSAND/UL (ref 0–0.1)
BASOPHILS NFR BLD AUTO: 0 % (ref 0–1)
BASOPHILS NFR BLD AUTO: 0 % (ref 0–1)
BASOPHILS NFR MAR MANUAL: 0 % (ref 0–1)
BILIRUB DIRECT SERPL-MCNC: 0.17 MG/DL (ref 0–0.2)
BILIRUB DIRECT SERPL-MCNC: 0.31 MG/DL (ref 0–0.2)
BILIRUB DIRECT SERPL-MCNC: 0.4 MG/DL (ref 0–0.2)
BILIRUB SERPL-MCNC: 0.58 MG/DL (ref 0.2–1)
BILIRUB SERPL-MCNC: 0.59 MG/DL (ref 0.2–1)
BILIRUB SERPL-MCNC: 0.71 MG/DL (ref 0.2–1)
BILIRUB SERPL-MCNC: 0.79 MG/DL (ref 0.2–1)
BILIRUB UR QL STRIP: NEGATIVE
BILIRUB UR QL STRIP: NEGATIVE
BLD GP AB SCN SERPL QL: NEGATIVE
BODY TEMPERATURE: 96.4 DEGREES FEHRENHEIT
BODY TEMPERATURE: 97.7 DEGREES FEHRENHEIT
BODY TEMPERATURE: 98.6 DEGREES FEHRENHEIT
BODY TEMPERATURE: 99 DEGREES FEHRENHEIT
BSA FOR ECHO PROCEDURE: 1.71 M2
BUN SERPL-MCNC: 103 MG/DL (ref 5–25)
BUN SERPL-MCNC: 123 MG/DL (ref 5–25)
BUN SERPL-MCNC: 133 MG/DL (ref 5–25)
BUN SERPL-MCNC: 135 MG/DL (ref 5–25)
BUN SERPL-MCNC: 143 MG/DL (ref 5–25)
BUN SERPL-MCNC: 179 MG/DL (ref 5–25)
BUN SERPL-MCNC: 188 MG/DL (ref 5–25)
BUN SERPL-MCNC: 194 MG/DL (ref 5–25)
BUN SERPL-MCNC: 194 MG/DL (ref 5–25)
BUN SERPL-MCNC: 199 MG/DL (ref 5–25)
BUN SERPL-MCNC: 201 MG/DL (ref 5–25)
BUN SERPL-MCNC: 205 MG/DL (ref 5–25)
BUN SERPL-MCNC: 58 MG/DL (ref 5–25)
BUN SERPL-MCNC: 67 MG/DL (ref 5–25)
CA-I BLD-SCNC: 0.99 MMOL/L (ref 1.12–1.32)
CA-I BLD-SCNC: 1.09 MMOL/L (ref 1.12–1.32)
CA-I BLD-SCNC: 1.12 MMOL/L (ref 1.12–1.32)
CA-I BLD-SCNC: 1.13 MMOL/L (ref 1.12–1.32)
CA-I BLD-SCNC: 1.22 MMOL/L (ref 1.12–1.32)
CALCIUM ALBUM COR SERPL-MCNC: >9.6 MG/DL (ref 8.3–10.1)
CALCIUM SERPL-MCNC: 7.1 MG/DL (ref 8.4–10.2)
CALCIUM SERPL-MCNC: 7.5 MG/DL (ref 8.4–10.2)
CALCIUM SERPL-MCNC: 7.6 MG/DL (ref 8.4–10.2)
CALCIUM SERPL-MCNC: 7.7 MG/DL (ref 8.4–10.2)
CALCIUM SERPL-MCNC: 7.8 MG/DL (ref 8.4–10.2)
CALCIUM SERPL-MCNC: 7.8 MG/DL (ref 8.4–10.2)
CALCIUM SERPL-MCNC: 8.1 MG/DL (ref 8.4–10.2)
CALCIUM SERPL-MCNC: 8.2 MG/DL (ref 8.4–10.2)
CALCIUM SERPL-MCNC: 8.4 MG/DL (ref 8.4–10.2)
CALCIUM SERPL-MCNC: 8.5 MG/DL (ref 8.4–10.2)
CALCIUM SERPL-MCNC: 8.6 MG/DL (ref 8.4–10.2)
CALCIUM SERPL-MCNC: 8.8 MG/DL (ref 8.4–10.2)
CALCIUM SERPL-MCNC: 8.8 MG/DL (ref 8.4–10.2)
CALCIUM SERPL-MCNC: 9.1 MG/DL (ref 8.4–10.2)
CHLORIDE SERPL-SCNC: 100 MMOL/L (ref 96–108)
CHLORIDE SERPL-SCNC: 101 MMOL/L (ref 96–108)
CHLORIDE SERPL-SCNC: 102 MMOL/L (ref 96–108)
CHLORIDE SERPL-SCNC: 104 MMOL/L (ref 96–108)
CHLORIDE SERPL-SCNC: 105 MMOL/L (ref 96–108)
CHLORIDE SERPL-SCNC: 63 MMOL/L (ref 96–108)
CHLORIDE SERPL-SCNC: 77 MMOL/L (ref 96–108)
CHLORIDE SERPL-SCNC: 79 MMOL/L (ref 96–108)
CHLORIDE SERPL-SCNC: 82 MMOL/L (ref 96–108)
CHLORIDE SERPL-SCNC: 86 MMOL/L (ref 96–108)
CHLORIDE SERPL-SCNC: 88 MMOL/L (ref 96–108)
CHLORIDE SERPL-SCNC: 98 MMOL/L (ref 96–108)
CHLORIDE UR-SCNC: <15 MMOL/L
CLARITY UR: ABNORMAL
CLARITY UR: ABNORMAL
CO2 SERPL-SCNC: 13 MMOL/L (ref 21–32)
CO2 SERPL-SCNC: 16 MMOL/L (ref 21–32)
CO2 SERPL-SCNC: 19 MMOL/L (ref 21–32)
CO2 SERPL-SCNC: 20 MMOL/L (ref 21–32)
CO2 SERPL-SCNC: 21 MMOL/L (ref 21–32)
CO2 SERPL-SCNC: 22 MMOL/L (ref 21–32)
CO2 SERPL-SCNC: 22 MMOL/L (ref 21–32)
CO2 SERPL-SCNC: 23 MMOL/L (ref 21–32)
CO2 SERPL-SCNC: 24 MMOL/L (ref 21–32)
COLOR UR: YELLOW
COLOR UR: YELLOW
CREAT SERPL-MCNC: 1.97 MG/DL (ref 0.6–1.3)
CREAT SERPL-MCNC: 2.04 MG/DL (ref 0.6–1.3)
CREAT SERPL-MCNC: 3.12 MG/DL (ref 0.6–1.3)
CREAT SERPL-MCNC: 3.78 MG/DL (ref 0.6–1.3)
CREAT SERPL-MCNC: 3.84 MG/DL (ref 0.6–1.3)
CREAT SERPL-MCNC: 3.85 MG/DL (ref 0.6–1.3)
CREAT SERPL-MCNC: 3.96 MG/DL (ref 0.6–1.3)
CREAT SERPL-MCNC: 5.41 MG/DL (ref 0.6–1.3)
CREAT SERPL-MCNC: 5.81 MG/DL (ref 0.6–1.3)
CREAT SERPL-MCNC: 6.31 MG/DL (ref 0.6–1.3)
CREAT SERPL-MCNC: 6.97 MG/DL (ref 0.6–1.3)
CREAT SERPL-MCNC: 7.17 MG/DL (ref 0.6–1.3)
CREAT SERPL-MCNC: 7.4 MG/DL (ref 0.6–1.3)
CREAT SERPL-MCNC: 8.34 MG/DL (ref 0.6–1.3)
CREAT UR-MCNC: 107.3 MG/DL
CREAT UR-MCNC: 84.7 MG/DL
DOP CALC LVOT AREA: 2.83 CM2
DOP CALC LVOT DIAMETER: 1.9 CM
E WAVE DECELERATION TIME: 271 MS
E/A RATIO: 0.54
EOSINOPHIL # BLD AUTO: 0 THOUSAND/ÂΜL (ref 0–0.61)
EOSINOPHIL # BLD AUTO: 0.01 THOUSAND/ÂΜL (ref 0–0.61)
EOSINOPHIL # BLD MANUAL: 0 THOUSAND/UL (ref 0–0.4)
EOSINOPHIL NFR BLD AUTO: 0 % (ref 0–6)
EOSINOPHIL NFR BLD AUTO: 0 % (ref 0–6)
EOSINOPHIL NFR BLD MANUAL: 0 % (ref 0–6)
EOSINOPHIL NFR URNS MANUAL: 0 %
ERYTHROCYTE [DISTWIDTH] IN BLOOD BY AUTOMATED COUNT: 15.9 % (ref 11.6–15.1)
ERYTHROCYTE [DISTWIDTH] IN BLOOD BY AUTOMATED COUNT: 15.9 % (ref 11.6–15.1)
ERYTHROCYTE [DISTWIDTH] IN BLOOD BY AUTOMATED COUNT: 16 % (ref 11.6–15.1)
ERYTHROCYTE [DISTWIDTH] IN BLOOD BY AUTOMATED COUNT: 16.4 % (ref 11.6–15.1)
ERYTHROCYTE [DISTWIDTH] IN BLOOD BY AUTOMATED COUNT: 16.4 % (ref 11.6–15.1)
ERYTHROCYTE [DISTWIDTH] IN BLOOD BY AUTOMATED COUNT: 19.2 % (ref 11.6–15.1)
ERYTHROCYTE [DISTWIDTH] IN BLOOD BY AUTOMATED COUNT: 21.2 % (ref 11.6–15.1)
ERYTHROCYTE [DISTWIDTH] IN BLOOD BY AUTOMATED COUNT: 21.3 % (ref 11.6–15.1)
FERRITIN SERPL-MCNC: 125 NG/ML (ref 11–307)
FRACTIONAL SHORTENING: 31 (ref 28–44)
GFR SERPL CREATININE-BSD FRML MDRD: 10 ML/MIN/1.73SQ M
GFR SERPL CREATININE-BSD FRML MDRD: 13 ML/MIN/1.73SQ M
GFR SERPL CREATININE-BSD FRML MDRD: 22 ML/MIN/1.73SQ M
GFR SERPL CREATININE-BSD FRML MDRD: 23 ML/MIN/1.73SQ M
GFR SERPL CREATININE-BSD FRML MDRD: 4 ML/MIN/1.73SQ M
GFR SERPL CREATININE-BSD FRML MDRD: 5 ML/MIN/1.73SQ M
GFR SERPL CREATININE-BSD FRML MDRD: 5 ML/MIN/1.73SQ M
GFR SERPL CREATININE-BSD FRML MDRD: 6 ML/MIN/1.73SQ M
GFR SERPL CREATININE-BSD FRML MDRD: 6 ML/MIN/1.73SQ M
GFR SERPL CREATININE-BSD FRML MDRD: 9 ML/MIN/1.73SQ M
GLUCOSE SERPL-MCNC: 101 MG/DL (ref 65–140)
GLUCOSE SERPL-MCNC: 109 MG/DL (ref 65–140)
GLUCOSE SERPL-MCNC: 113 MG/DL (ref 65–140)
GLUCOSE SERPL-MCNC: 114 MG/DL (ref 65–140)
GLUCOSE SERPL-MCNC: 122 MG/DL (ref 65–140)
GLUCOSE SERPL-MCNC: 123 MG/DL (ref 65–140)
GLUCOSE SERPL-MCNC: 128 MG/DL (ref 65–140)
GLUCOSE SERPL-MCNC: 130 MG/DL (ref 65–140)
GLUCOSE SERPL-MCNC: 132 MG/DL (ref 65–140)
GLUCOSE SERPL-MCNC: 134 MG/DL (ref 65–140)
GLUCOSE SERPL-MCNC: 138 MG/DL (ref 65–140)
GLUCOSE SERPL-MCNC: 140 MG/DL (ref 65–140)
GLUCOSE SERPL-MCNC: 140 MG/DL (ref 65–140)
GLUCOSE SERPL-MCNC: 144 MG/DL (ref 65–140)
GLUCOSE SERPL-MCNC: 146 MG/DL (ref 65–140)
GLUCOSE SERPL-MCNC: 164 MG/DL (ref 65–140)
GLUCOSE SERPL-MCNC: 173 MG/DL (ref 65–140)
GLUCOSE SERPL-MCNC: 178 MG/DL (ref 65–140)
GLUCOSE SERPL-MCNC: 178 MG/DL (ref 65–140)
GLUCOSE SERPL-MCNC: 196 MG/DL (ref 65–140)
GLUCOSE SERPL-MCNC: 198 MG/DL (ref 65–140)
GLUCOSE SERPL-MCNC: 241 MG/DL (ref 65–140)
GLUCOSE SERPL-MCNC: 271 MG/DL (ref 65–140)
GLUCOSE SERPL-MCNC: 371 MG/DL (ref 65–140)
GLUCOSE SERPL-MCNC: 379 MG/DL (ref 65–140)
GLUCOSE SERPL-MCNC: 67 MG/DL (ref 65–140)
GLUCOSE SERPL-MCNC: 69 MG/DL (ref 65–140)
GLUCOSE SERPL-MCNC: 72 MG/DL (ref 65–140)
GLUCOSE SERPL-MCNC: 77 MG/DL (ref 65–140)
GLUCOSE SERPL-MCNC: 83 MG/DL (ref 65–140)
GLUCOSE SERPL-MCNC: 90 MG/DL (ref 65–140)
GLUCOSE SERPL-MCNC: 95 MG/DL (ref 65–140)
GLUCOSE UR STRIP-MCNC: NEGATIVE MG/DL
GLUCOSE UR STRIP-MCNC: NEGATIVE MG/DL
HCO3 BLDA-SCNC: 12.6 MMOL/L (ref 22–28)
HCO3 BLDA-SCNC: 15.5 MMOL/L (ref 22–28)
HCO3 BLDA-SCNC: 17.6 MMOL/L (ref 22–28)
HCO3 BLDA-SCNC: 17.7 MMOL/L (ref 22–28)
HCO3 BLDA-SCNC: 17.7 MMOL/L (ref 22–28)
HCO3 BLDA-SCNC: 18.3 MMOL/L (ref 22–28)
HCO3 BLDA-SCNC: 19.6 MMOL/L (ref 22–28)
HCO3 BLDA-SCNC: 20.8 MMOL/L (ref 22–28)
HCO3 BLDA-SCNC: 20.9 MMOL/L (ref 22–28)
HCO3 BLDA-SCNC: 22.3 MMOL/L (ref 22–28)
HCO3 BLDA-SCNC: 22.7 MMOL/L (ref 22–28)
HCO3 BLDV-SCNC: 12.3 MMOL/L (ref 24–30)
HCO3 BLDV-SCNC: 18.2 MMOL/L (ref 24–30)
HCO3 BLDV-SCNC: 24.4 MMOL/L (ref 24–30)
HCT VFR BLD AUTO: 23.4 % (ref 34.8–46.1)
HCT VFR BLD AUTO: 24.8 % (ref 34.8–46.1)
HCT VFR BLD AUTO: 26.6 % (ref 34.8–46.1)
HCT VFR BLD AUTO: 27.5 % (ref 34.8–46.1)
HCT VFR BLD AUTO: 30.3 % (ref 34.8–46.1)
HCT VFR BLD AUTO: 31.4 % (ref 34.8–46.1)
HCT VFR BLD AUTO: 31.6 % (ref 34.8–46.1)
HCT VFR BLD AUTO: 37.1 % (ref 34.8–46.1)
HGB BLD-MCNC: 12.1 G/DL (ref 11.5–15.4)
HGB BLD-MCNC: 7 G/DL (ref 11.5–15.4)
HGB BLD-MCNC: 7.5 G/DL (ref 11.5–15.4)
HGB BLD-MCNC: 8.1 G/DL (ref 11.5–15.4)
HGB BLD-MCNC: 8.3 G/DL (ref 11.5–15.4)
HGB BLD-MCNC: 9.2 G/DL (ref 11.5–15.4)
HGB BLD-MCNC: 9.5 G/DL (ref 11.5–15.4)
HGB BLD-MCNC: 9.5 G/DL (ref 11.5–15.4)
HGB UR QL STRIP.AUTO: ABNORMAL
HGB UR QL STRIP.AUTO: NEGATIVE
HOROWITZ INDEX BLDA+IHG-RTO: 40 MM[HG]
HOROWITZ INDEX BLDA+IHG-RTO: 50 MM[HG]
HOROWITZ INDEX BLDA+IHG-RTO: 60 MM[HG]
HOROWITZ INDEX BLDA+IHG-RTO: 60 MM[HG]
HYALINE CASTS #/AREA URNS LPF: ABNORMAL /LPF
HYALINE CASTS #/AREA URNS LPF: ABNORMAL /LPF
I-TIME: 0.85
I-TIME: 0.85
IMM GRANULOCYTES # BLD AUTO: 0.05 THOUSAND/UL (ref 0–0.2)
IMM GRANULOCYTES # BLD AUTO: 0.05 THOUSAND/UL (ref 0–0.2)
IMM GRANULOCYTES NFR BLD AUTO: 0 % (ref 0–2)
IMM GRANULOCYTES NFR BLD AUTO: 0 % (ref 0–2)
INR PPP: 1.24 (ref 0.85–1.19)
INR PPP: 1.63 (ref 0.85–1.19)
INTERVENTRICULAR SEPTUM IN DIASTOLE (PARASTERNAL SHORT AXIS VIEW): 1.3 CM
INTERVENTRICULAR SEPTUM: 1.3 CM (ref 0.6–1.1)
IRON SATN MFR SERPL: 20 % (ref 15–50)
IRON SERPL-MCNC: 44 UG/DL (ref 50–212)
KETONES UR STRIP-MCNC: ABNORMAL MG/DL
KETONES UR STRIP-MCNC: NEGATIVE MG/DL
LAAS-AP2: 16.7 CM2
LAAS-AP4: 16.4 CM2
LACTATE SERPL-SCNC: 0.6 MMOL/L (ref 0.5–2)
LACTATE SERPL-SCNC: 1.9 MMOL/L (ref 0.5–2)
LACTATE SERPL-SCNC: 3.6 MMOL/L (ref 0.5–2)
LACTATE SERPL-SCNC: 4.1 MMOL/L (ref 0.5–2)
LACTATE SERPL-SCNC: 4.1 MMOL/L (ref 0.5–2)
LACTATE SERPL-SCNC: 5.2 MMOL/L (ref 0.5–2)
LACTATE SERPL-SCNC: 5.7 MMOL/L (ref 0.5–2)
LACTATE SERPL-SCNC: 5.7 MMOL/L (ref 0.5–2)
LACTATE SERPL-SCNC: 5.9 MMOL/L (ref 0.5–2)
LACTATE SERPL-SCNC: 6.1 MMOL/L (ref 0.5–2)
LACTATE SERPL-SCNC: 9.4 MMOL/L (ref 0.5–2)
LACTATE SERPL-SCNC: 9.8 MMOL/L (ref 0.5–2)
LEFT ATRIUM SIZE: 3.1 CM
LEFT ATRIUM VOLUME (MOD BIPLANE): 39 ML
LEFT ATRIUM VOLUME INDEX (MOD BIPLANE): 22.8 ML/M2
LEFT INTERNAL DIMENSION IN SYSTOLE: 2.7 CM (ref 2.1–4)
LEFT VENTRICULAR INTERNAL DIMENSION IN DIASTOLE: 3.9 CM (ref 3.5–6)
LEFT VENTRICULAR POSTERIOR WALL IN END DIASTOLE: 1.2 CM
LEFT VENTRICULAR STROKE VOLUME: 41 ML
LEUKOCYTE ESTERASE UR QL STRIP: ABNORMAL
LEUKOCYTE ESTERASE UR QL STRIP: ABNORMAL
LG PLATELETS BLD QL SMEAR: PRESENT
LVSV (TEICH): 41 ML
LYMPHOCYTES # BLD AUTO: 0.17 THOUSAND/UL (ref 0.6–4.47)
LYMPHOCYTES # BLD AUTO: 1.23 THOUSANDS/ÂΜL (ref 0.6–4.47)
LYMPHOCYTES # BLD AUTO: 1.4 THOUSANDS/ÂΜL (ref 0.6–4.47)
LYMPHOCYTES # BLD AUTO: 6 % (ref 14–44)
LYMPHOCYTES NFR BLD AUTO: 11 % (ref 14–44)
LYMPHOCYTES NFR BLD AUTO: 9 % (ref 14–44)
MAGNESIUM SERPL-MCNC: 1.5 MG/DL (ref 1.9–2.7)
MAGNESIUM SERPL-MCNC: 2.2 MG/DL (ref 1.9–2.7)
MAGNESIUM SERPL-MCNC: 2.2 MG/DL (ref 1.9–2.7)
MAGNESIUM SERPL-MCNC: 2.3 MG/DL (ref 1.9–2.7)
MAGNESIUM SERPL-MCNC: 2.6 MG/DL (ref 1.9–2.7)
MAGNESIUM SERPL-MCNC: 2.9 MG/DL (ref 1.9–2.7)
MAGNESIUM SERPL-MCNC: 2.9 MG/DL (ref 1.9–2.7)
MCH RBC QN AUTO: 25.7 PG (ref 26.8–34.3)
MCH RBC QN AUTO: 26 PG (ref 26.8–34.3)
MCH RBC QN AUTO: 26.3 PG (ref 26.8–34.3)
MCH RBC QN AUTO: 26.5 PG (ref 26.8–34.3)
MCH RBC QN AUTO: 26.6 PG (ref 26.8–34.3)
MCH RBC QN AUTO: 26.8 PG (ref 26.8–34.3)
MCH RBC QN AUTO: 26.9 PG (ref 26.8–34.3)
MCH RBC QN AUTO: 27.3 PG (ref 26.8–34.3)
MCHC RBC AUTO-ENTMCNC: 29.1 G/DL (ref 31.4–37.4)
MCHC RBC AUTO-ENTMCNC: 29.9 G/DL (ref 31.4–37.4)
MCHC RBC AUTO-ENTMCNC: 30.2 G/DL (ref 31.4–37.4)
MCHC RBC AUTO-ENTMCNC: 30.2 G/DL (ref 31.4–37.4)
MCHC RBC AUTO-ENTMCNC: 30.3 G/DL (ref 31.4–37.4)
MCHC RBC AUTO-ENTMCNC: 30.5 G/DL (ref 31.4–37.4)
MCHC RBC AUTO-ENTMCNC: 31.4 G/DL (ref 31.4–37.4)
MCHC RBC AUTO-ENTMCNC: 32.6 G/DL (ref 31.4–37.4)
MCV RBC AUTO: 84 FL (ref 82–98)
MCV RBC AUTO: 85 FL (ref 82–98)
MCV RBC AUTO: 86 FL (ref 82–98)
MCV RBC AUTO: 88 FL (ref 82–98)
MCV RBC AUTO: 89 FL (ref 82–98)
MCV RBC AUTO: 92 FL (ref 82–98)
METAMYELOCYTE ABSOLUTE CT: 0.12 THOUSAND/UL (ref 0–0.1)
METAMYELOCYTES NFR BLD MANUAL: 4 % (ref 0–1)
MICROALBUMIN UR-MCNC: 39.3 MG/L
MICROALBUMIN/CREAT 24H UR: 46 MG/G CREATININE (ref 0–30)
MONOCYTES # BLD AUTO: 0.03 THOUSAND/UL (ref 0–1.22)
MONOCYTES # BLD AUTO: 0.57 THOUSAND/ÂΜL (ref 0.17–1.22)
MONOCYTES # BLD AUTO: 0.69 THOUSAND/ÂΜL (ref 0.17–1.22)
MONOCYTES NFR BLD AUTO: 5 % (ref 4–12)
MONOCYTES NFR BLD AUTO: 5 % (ref 4–12)
MONOCYTES NFR BLD: 1 % (ref 4–12)
MRSA NOSE QL CULT: ABNORMAL
MRSA NOSE QL CULT: ABNORMAL
MV E'TISSUE VEL-LAT: 11 CM/S
MV E'TISSUE VEL-SEP: 9 CM/S
MV PEAK A VEL: 0.94 M/S
MV PEAK E VEL: 51 CM/S
MV STENOSIS PRESSURE HALF TIME: 79 MS
MV VALVE AREA P 1/2 METHOD: 2.78
MYELOCYTE ABSOLUTE CT: 0.03 THOUSAND/UL (ref 0–0.1)
MYELOCYTES NFR BLD MANUAL: 1 % (ref 0–1)
NEUTROPHILS # BLD AUTO: 12.95 THOUSANDS/ÂΜL (ref 1.85–7.62)
NEUTROPHILS # BLD AUTO: 9.69 THOUSANDS/ÂΜL (ref 1.85–7.62)
NEUTROPHILS # BLD MANUAL: 2.54 THOUSAND/UL (ref 1.85–7.62)
NEUTS BAND NFR BLD MANUAL: 24 % (ref 0–8)
NEUTS SEG NFR BLD AUTO: 64 % (ref 43–75)
NEUTS SEG NFR BLD AUTO: 84 % (ref 43–75)
NEUTS SEG NFR BLD AUTO: 86 % (ref 43–75)
NITRITE UR QL STRIP: NEGATIVE
NITRITE UR QL STRIP: NEGATIVE
NON VENT ROOM AIR: 21 %
NON-SQ EPI CELLS URNS QL MICRO: ABNORMAL /HPF
NON-SQ EPI CELLS URNS QL MICRO: ABNORMAL /HPF
NRBC BLD AUTO-RTO: 0 /100 WBCS
O2 CT BLDA-SCNC: 10 ML/DL (ref 16–23)
O2 CT BLDA-SCNC: 10 ML/DL (ref 16–23)
O2 CT BLDA-SCNC: 10.1 ML/DL (ref 16–23)
O2 CT BLDA-SCNC: 10.4 ML/DL (ref 16–23)
O2 CT BLDA-SCNC: 11.4 ML/DL (ref 16–23)
O2 CT BLDA-SCNC: 11.8 ML/DL (ref 16–23)
O2 CT BLDA-SCNC: 13.3 ML/DL (ref 16–23)
O2 CT BLDA-SCNC: 14.7 ML/DL (ref 16–23)
O2 CT BLDA-SCNC: 15.2 ML/DL (ref 16–23)
O2 CT BLDA-SCNC: 15.2 ML/DL (ref 16–23)
O2 CT BLDA-SCNC: 9.9 ML/DL (ref 16–23)
O2 CT BLDV-SCNC: 10.5 ML/DL
O2 CT BLDV-SCNC: 10.9 ML/DL
O2 CT BLDV-SCNC: 9.3 ML/DL
OSMOLALITY UR: 395 MMOL/KG (ref 250–900)
OXYHGB MFR BLDA: 87.1 % (ref 94–97)
OXYHGB MFR BLDA: 89.1 % (ref 94–97)
OXYHGB MFR BLDA: 92 % (ref 94–97)
OXYHGB MFR BLDA: 92.3 % (ref 94–97)
OXYHGB MFR BLDA: 92.3 % (ref 94–97)
OXYHGB MFR BLDA: 93.8 % (ref 94–97)
OXYHGB MFR BLDA: 95 % (ref 94–97)
OXYHGB MFR BLDA: 95.6 % (ref 94–97)
OXYHGB MFR BLDA: 96 % (ref 94–97)
OXYHGB MFR BLDA: 96.2 % (ref 94–97)
OXYHGB MFR BLDA: 97.3 % (ref 94–97)
P AXIS: 90 DEGREES
PCO2 BLDA: 22.5 MM HG (ref 36–44)
PCO2 BLDA: 23.3 MM HG (ref 36–44)
PCO2 BLDA: 29.5 MM HG (ref 36–44)
PCO2 BLDA: 30.4 MM HG (ref 36–44)
PCO2 BLDA: 30.7 MM HG (ref 36–44)
PCO2 BLDA: 30.8 MM HG (ref 36–44)
PCO2 BLDA: 31.9 MM HG (ref 36–44)
PCO2 BLDA: 32.4 MM HG (ref 36–44)
PCO2 BLDA: 32.7 MM HG (ref 36–44)
PCO2 BLDA: 34.4 MM HG (ref 36–44)
PCO2 BLDA: 36.1 MM HG (ref 36–44)
PCO2 BLDV: 28.5 MM HG (ref 42–50)
PCO2 BLDV: 37 MM HG (ref 42–50)
PCO2 BLDV: 40.8 MM HG (ref 42–50)
PCO2 TEMP ADJ BLDA: 30 MM HG (ref 36–44)
PEEP RESPIRATORY: 6 CM[H2O]
PEEP RESPIRATORY: 8 CM[H2O]
PH BLD: 7.38 [PH] (ref 7.35–7.45)
PH BLDA: 7.31 [PH] (ref 7.35–7.45)
PH BLDA: 7.33 [PH] (ref 7.35–7.45)
PH BLDA: 7.37 [PH] (ref 7.35–7.45)
PH BLDA: 7.38 [PH] (ref 7.35–7.45)
PH BLDA: 7.38 [PH] (ref 7.35–7.45)
PH BLDA: 7.39 [PH] (ref 7.35–7.45)
PH BLDA: 7.4 [PH] (ref 7.35–7.45)
PH BLDA: 7.44 [PH] (ref 7.35–7.45)
PH BLDA: 7.45 [PH] (ref 7.35–7.45)
PH BLDA: 7.45 [PH] (ref 7.35–7.45)
PH BLDA: 7.57 [PH] (ref 7.35–7.45)
PH BLDV: 7.25 [PH] (ref 7.3–7.4)
PH BLDV: 7.27 [PH] (ref 7.3–7.4)
PH BLDV: 7.44 [PH] (ref 7.3–7.4)
PH UR STRIP.AUTO: 5 [PH]
PH UR STRIP.AUTO: 5 [PH]
PHOSPHATE SERPL-MCNC: 12.6 MG/DL (ref 2.3–4.1)
PHOSPHATE SERPL-MCNC: 5.6 MG/DL (ref 2.3–4.1)
PHOSPHATE SERPL-MCNC: 5.8 MG/DL (ref 2.3–4.1)
PHOSPHATE SERPL-MCNC: 6.8 MG/DL (ref 2.3–4.1)
PHOSPHATE SERPL-MCNC: 7.6 MG/DL (ref 2.3–4.1)
PHOSPHATE SERPL-MCNC: 8.8 MG/DL (ref 2.3–4.1)
PLATELET # BLD AUTO: 132 THOUSANDS/UL (ref 149–390)
PLATELET # BLD AUTO: 170 THOUSANDS/UL (ref 149–390)
PLATELET # BLD AUTO: 260 THOUSANDS/UL (ref 149–390)
PLATELET # BLD AUTO: 348 THOUSANDS/UL (ref 149–390)
PLATELET # BLD AUTO: 357 THOUSANDS/UL (ref 149–390)
PLATELET # BLD AUTO: 398 THOUSANDS/UL (ref 149–390)
PLATELET # BLD AUTO: 604 THOUSANDS/UL (ref 149–390)
PLATELET # BLD AUTO: 63 THOUSANDS/UL (ref 149–390)
PLATELET # BLD AUTO: 75 THOUSANDS/UL (ref 149–390)
PLATELET BLD QL SMEAR: ADEQUATE
PMV BLD AUTO: 10 FL (ref 8.9–12.7)
PMV BLD AUTO: 10.2 FL (ref 8.9–12.7)
PMV BLD AUTO: 9.2 FL (ref 8.9–12.7)
PMV BLD AUTO: 9.4 FL (ref 8.9–12.7)
PMV BLD AUTO: 9.4 FL (ref 8.9–12.7)
PMV BLD AUTO: 9.6 FL (ref 8.9–12.7)
PMV BLD AUTO: 9.8 FL (ref 8.9–12.7)
PMV BLD AUTO: 9.8 FL (ref 8.9–12.7)
PMV BLD AUTO: 9.9 FL (ref 8.9–12.7)
PO2 BLD: 85.7 MM HG (ref 75–129)
PO2 BLDA: 111 MM HG (ref 75–129)
PO2 BLDA: 114.7 MM HG (ref 75–129)
PO2 BLDA: 141.5 MM HG (ref 75–129)
PO2 BLDA: 148.6 MM HG (ref 75–129)
PO2 BLDA: 66 MM HG (ref 75–129)
PO2 BLDA: 67.5 MM HG (ref 75–129)
PO2 BLDA: 82.7 MM HG (ref 75–129)
PO2 BLDA: 85.3 MM HG (ref 75–129)
PO2 BLDA: 88.5 MM HG (ref 75–129)
PO2 BLDA: 89.3 MM HG (ref 75–129)
PO2 BLDA: 92.3 MM HG (ref 75–129)
PO2 BLDV: 35.5 MM HG (ref 35–45)
PO2 BLDV: 48 MM HG (ref 35–45)
PO2 BLDV: 48.7 MM HG (ref 35–45)
POTASSIUM SERPL-SCNC: 3.5 MMOL/L (ref 3.5–5.3)
POTASSIUM SERPL-SCNC: 3.8 MMOL/L (ref 3.5–5.3)
POTASSIUM SERPL-SCNC: 3.9 MMOL/L (ref 3.5–5.3)
POTASSIUM SERPL-SCNC: 4 MMOL/L (ref 3.5–5.3)
POTASSIUM SERPL-SCNC: 4.1 MMOL/L (ref 3.5–5.3)
POTASSIUM SERPL-SCNC: 4.4 MMOL/L (ref 3.5–5.3)
POTASSIUM SERPL-SCNC: 4.5 MMOL/L (ref 3.5–5.3)
POTASSIUM SERPL-SCNC: 4.6 MMOL/L (ref 3.5–5.3)
POTASSIUM SERPL-SCNC: 4.7 MMOL/L (ref 3.5–5.3)
POTASSIUM SERPL-SCNC: 5 MMOL/L (ref 3.5–5.3)
POTASSIUM SERPL-SCNC: 5.1 MMOL/L (ref 3.5–5.3)
POTASSIUM SERPL-SCNC: 5.5 MMOL/L (ref 3.5–5.3)
PR INTERVAL: 136 MS
PROCALCITONIN SERPL-MCNC: 23.11 NG/ML
PROT SERPL-MCNC: 4.4 G/DL (ref 6.4–8.4)
PROT SERPL-MCNC: 4.6 G/DL (ref 6.4–8.4)
PROT SERPL-MCNC: 7.4 G/DL (ref 6.4–8.4)
PROT SERPL-MCNC: <3 G/DL (ref 6.4–8.4)
PROT UR STRIP-MCNC: ABNORMAL MG/DL
PROT UR STRIP-MCNC: ABNORMAL MG/DL
PROTHROMBIN TIME: 16.3 SECONDS (ref 12.3–15)
PROTHROMBIN TIME: 20 SECONDS (ref 12.3–15)
PTH-INTACT SERPL-MCNC: 363.2 PG/ML (ref 12–88)
QRS AXIS: 66 DEGREES
QRSD INTERVAL: 78 MS
QT INTERVAL: 336 MS
QTC INTERVAL: 433 MS
RBC # BLD AUTO: 2.63 MILLION/UL (ref 3.81–5.12)
RBC # BLD AUTO: 2.83 MILLION/UL (ref 3.81–5.12)
RBC # BLD AUTO: 3.08 MILLION/UL (ref 3.81–5.12)
RBC # BLD AUTO: 3.19 MILLION/UL (ref 3.81–5.12)
RBC # BLD AUTO: 3.43 MILLION/UL (ref 3.81–5.12)
RBC # BLD AUTO: 3.53 MILLION/UL (ref 3.81–5.12)
RBC # BLD AUTO: 3.69 MILLION/UL (ref 3.81–5.12)
RBC # BLD AUTO: 4.43 MILLION/UL (ref 3.81–5.12)
RBC #/AREA URNS AUTO: ABNORMAL /HPF
RBC #/AREA URNS AUTO: ABNORMAL /HPF
RBC MORPH BLD: PRESENT
RH BLD: POSITIVE
RH BLD: POSITIVE
RIGHT ATRIUM AREA SYSTOLE A4C: 11.9 CM2
RIGHT VENTRICLE ID DIMENSION: 2.7 CM
SL CV LEFT ATRIUM LENGTH A2C: 5.5 CM
SL CV LV EF: 55
SL CV PED ECHO LEFT VENTRICLE DIASTOLIC VOLUME (MOD BIPLANE) 2D: 66 ML
SL CV PED ECHO LEFT VENTRICLE SYSTOLIC VOLUME (MOD BIPLANE) 2D: 26 ML
SODIUM 24H UR-SCNC: 23 MOL/L
SODIUM SERPL-SCNC: 124 MMOL/L (ref 135–147)
SODIUM SERPL-SCNC: 128 MMOL/L (ref 135–147)
SODIUM SERPL-SCNC: 129 MMOL/L (ref 135–147)
SODIUM SERPL-SCNC: 130 MMOL/L (ref 135–147)
SODIUM SERPL-SCNC: 133 MMOL/L (ref 135–147)
SODIUM SERPL-SCNC: 135 MMOL/L (ref 135–147)
SODIUM SERPL-SCNC: 137 MMOL/L (ref 135–147)
SODIUM SERPL-SCNC: 139 MMOL/L (ref 135–147)
SODIUM SERPL-SCNC: 140 MMOL/L (ref 135–147)
SODIUM SERPL-SCNC: 141 MMOL/L (ref 135–147)
SODIUM SERPL-SCNC: 142 MMOL/L (ref 135–147)
SODIUM SERPL-SCNC: 143 MMOL/L (ref 135–147)
SP GR UR STRIP.AUTO: 1.02 (ref 1–1.03)
SP GR UR STRIP.AUTO: 1.02 (ref 1–1.03)
SPECIMEN EXPIRATION DATE: NORMAL
SPECIMEN SOURCE: ABNORMAL
T WAVE AXIS: 80 DEGREES
TIBC SERPL-MCNC: 222.6 UG/DL (ref 250–450)
TRANSFERRIN SERPL-MCNC: 159 MG/DL (ref 203–362)
TRICUSPID ANNULAR PLANE SYSTOLIC EXCURSION: 2 CM
UIBC SERPL-MCNC: 179 UG/DL (ref 155–355)
URATE SERPL-MCNC: 13.9 MG/DL (ref 2–7.5)
UROBILINOGEN UR STRIP-ACNC: <2 MG/DL
UROBILINOGEN UR STRIP-ACNC: <2 MG/DL
VANCOMYCIN SERPL-MCNC: 12.1 UG/ML (ref 10–20)
VANCOMYCIN SERPL-MCNC: 13.5 UG/ML (ref 10–20)
VENT AC: 12
VENT AC: 16
VENT AC: 18
VENT AC: 18
VENT AC: 24
VENT- AC: AC
VENTRICULAR RATE: 100 BPM
VT SETTING VENT: 34 ML
VT SETTING VENT: 340 ML
WBC # BLD AUTO: 10.32 THOUSAND/UL (ref 4.31–10.16)
WBC # BLD AUTO: 11.56 THOUSAND/UL (ref 4.31–10.16)
WBC # BLD AUTO: 15.1 THOUSAND/UL (ref 4.31–10.16)
WBC # BLD AUTO: 15.23 THOUSAND/UL (ref 4.31–10.16)
WBC # BLD AUTO: 17.81 THOUSAND/UL (ref 4.31–10.16)
WBC # BLD AUTO: 2.27 THOUSAND/UL (ref 4.31–10.16)
WBC # BLD AUTO: 2.89 THOUSAND/UL (ref 4.31–10.16)
WBC # BLD AUTO: 20.68 THOUSAND/UL (ref 4.31–10.16)
WBC #/AREA URNS AUTO: ABNORMAL /HPF
WBC #/AREA URNS AUTO: ABNORMAL /HPF

## 2024-01-01 PROCEDURE — 83735 ASSAY OF MAGNESIUM: CPT | Performed by: NURSE PRACTITIONER

## 2024-01-01 PROCEDURE — 97605 NEG PRS WND THER DME<=50SQCM: CPT | Performed by: STUDENT IN AN ORGANIZED HEALTH CARE EDUCATION/TRAINING PROGRAM

## 2024-01-01 PROCEDURE — 83605 ASSAY OF LACTIC ACID: CPT | Performed by: NURSE PRACTITIONER

## 2024-01-01 PROCEDURE — 80048 BASIC METABOLIC PNL TOTAL CA: CPT

## 2024-01-01 PROCEDURE — 99291 CRITICAL CARE FIRST HOUR: CPT | Performed by: ANESTHESIOLOGY

## 2024-01-01 PROCEDURE — 80048 BASIC METABOLIC PNL TOTAL CA: CPT | Performed by: NURSE PRACTITIONER

## 2024-01-01 PROCEDURE — 86850 RBC ANTIBODY SCREEN: CPT | Performed by: NURSE PRACTITIONER

## 2024-01-01 PROCEDURE — 88305 TISSUE EXAM BY PATHOLOGIST: CPT | Performed by: PATHOLOGY

## 2024-01-01 PROCEDURE — NC001 PR NO CHARGE: Performed by: NURSE PRACTITIONER

## 2024-01-01 PROCEDURE — 84300 ASSAY OF URINE SODIUM: CPT

## 2024-01-01 PROCEDURE — 97605 NEG PRS WND THER DME<=50SQCM: CPT | Performed by: PHYSICIAN ASSISTANT

## 2024-01-01 PROCEDURE — 83735 ASSAY OF MAGNESIUM: CPT | Performed by: EMERGENCY MEDICINE

## 2024-01-01 PROCEDURE — 90945 DIALYSIS ONE EVALUATION: CPT

## 2024-01-01 PROCEDURE — 71045 X-RAY EXAM CHEST 1 VIEW: CPT

## 2024-01-01 PROCEDURE — 88307 TISSUE EXAM BY PATHOLOGIST: CPT | Performed by: PATHOLOGY

## 2024-01-01 PROCEDURE — 82570 ASSAY OF URINE CREATININE: CPT

## 2024-01-01 PROCEDURE — 84550 ASSAY OF BLOOD/URIC ACID: CPT

## 2024-01-01 PROCEDURE — 0DB80ZZ EXCISION OF SMALL INTESTINE, OPEN APPROACH: ICD-10-PCS | Performed by: SPECIALIST

## 2024-01-01 PROCEDURE — 85025 COMPLETE CBC W/AUTO DIFF WBC: CPT | Performed by: EMERGENCY MEDICINE

## 2024-01-01 PROCEDURE — 36600 WITHDRAWAL OF ARTERIAL BLOOD: CPT

## 2024-01-01 PROCEDURE — 49002 REOPENING OF ABDOMEN: CPT | Performed by: CLINICAL NURSE SPECIALIST

## 2024-01-01 PROCEDURE — 80076 HEPATIC FUNCTION PANEL: CPT | Performed by: INTERNAL MEDICINE

## 2024-01-01 PROCEDURE — 93306 TTE W/DOPPLER COMPLETE: CPT

## 2024-01-01 PROCEDURE — 84145 PROCALCITONIN (PCT): CPT | Performed by: NURSE PRACTITIONER

## 2024-01-01 PROCEDURE — 96374 THER/PROPH/DIAG INJ IV PUSH: CPT

## 2024-01-01 PROCEDURE — 0WJF0ZZ INSPECTION OF ABDOMINAL WALL, OPEN APPROACH: ICD-10-PCS | Performed by: STUDENT IN AN ORGANIZED HEALTH CARE EDUCATION/TRAINING PROGRAM

## 2024-01-01 PROCEDURE — 82948 REAGENT STRIP/BLOOD GLUCOSE: CPT

## 2024-01-01 PROCEDURE — 85027 COMPLETE CBC AUTOMATED: CPT | Performed by: NURSE PRACTITIONER

## 2024-01-01 PROCEDURE — 80048 BASIC METABOLIC PNL TOTAL CA: CPT | Performed by: EMERGENCY MEDICINE

## 2024-01-01 PROCEDURE — 84100 ASSAY OF PHOSPHORUS: CPT | Performed by: NURSE PRACTITIONER

## 2024-01-01 PROCEDURE — 87147 CULTURE TYPE IMMUNOLOGIC: CPT | Performed by: NURSE PRACTITIONER

## 2024-01-01 PROCEDURE — 80053 COMPREHEN METABOLIC PANEL: CPT | Performed by: NURSE PRACTITIONER

## 2024-01-01 PROCEDURE — 31500 INSERT EMERGENCY AIRWAY: CPT | Performed by: NURSE PRACTITIONER

## 2024-01-01 PROCEDURE — 99233 SBSQ HOSP IP/OBS HIGH 50: CPT | Performed by: INTERNAL MEDICINE

## 2024-01-01 PROCEDURE — 82805 BLOOD GASES W/O2 SATURATION: CPT

## 2024-01-01 PROCEDURE — 86900 BLOOD TYPING SEROLOGIC ABO: CPT | Performed by: NURSE PRACTITIONER

## 2024-01-01 PROCEDURE — 99024 POSTOP FOLLOW-UP VISIT: CPT | Performed by: SURGERY

## 2024-01-01 PROCEDURE — 36556 INSERT NON-TUNNEL CV CATH: CPT | Performed by: NURSE PRACTITIONER

## 2024-01-01 PROCEDURE — 44120 REMOVAL OF SMALL INTESTINE: CPT

## 2024-01-01 PROCEDURE — 85049 AUTOMATED PLATELET COUNT: CPT

## 2024-01-01 PROCEDURE — 99233 SBSQ HOSP IP/OBS HIGH 50: CPT | Performed by: SPECIALIST

## 2024-01-01 PROCEDURE — 74176 CT ABD & PELVIS W/O CONTRAST: CPT

## 2024-01-01 PROCEDURE — 87040 BLOOD CULTURE FOR BACTERIA: CPT | Performed by: NURSE PRACTITIONER

## 2024-01-01 PROCEDURE — 99291 CRITICAL CARE FIRST HOUR: CPT | Performed by: NURSE PRACTITIONER

## 2024-01-01 PROCEDURE — 82330 ASSAY OF CALCIUM: CPT | Performed by: NURSE PRACTITIONER

## 2024-01-01 PROCEDURE — 82805 BLOOD GASES W/O2 SATURATION: CPT | Performed by: NURSE PRACTITIONER

## 2024-01-01 PROCEDURE — 0BH17EZ INSERTION OF ENDOTRACHEAL AIRWAY INTO TRACHEA, VIA NATURAL OR ARTIFICIAL OPENING: ICD-10-PCS | Performed by: ANESTHESIOLOGY

## 2024-01-01 PROCEDURE — 94003 VENT MGMT INPAT SUBQ DAY: CPT

## 2024-01-01 PROCEDURE — 85610 PROTHROMBIN TIME: CPT | Performed by: NURSE PRACTITIONER

## 2024-01-01 PROCEDURE — 74022 RADEX COMPL AQT ABD SERIES: CPT

## 2024-01-01 PROCEDURE — 5A1945Z RESPIRATORY VENTILATION, 24-96 CONSECUTIVE HOURS: ICD-10-PCS | Performed by: ANESTHESIOLOGY

## 2024-01-01 PROCEDURE — 76705 ECHO EXAM OF ABDOMEN: CPT

## 2024-01-01 PROCEDURE — 80076 HEPATIC FUNCTION PANEL: CPT | Performed by: NURSE PRACTITIONER

## 2024-01-01 PROCEDURE — 30233R1 TRANSFUSION OF NONAUTOLOGOUS PLATELETS INTO PERIPHERAL VEIN, PERCUTANEOUS APPROACH: ICD-10-PCS | Performed by: ANESTHESIOLOGY

## 2024-01-01 PROCEDURE — 82436 ASSAY OF URINE CHLORIDE: CPT

## 2024-01-01 PROCEDURE — 93010 ELECTROCARDIOGRAM REPORT: CPT | Performed by: INTERNAL MEDICINE

## 2024-01-01 PROCEDURE — 87075 CULTR BACTERIA EXCEPT BLOOD: CPT | Performed by: SPECIALIST

## 2024-01-01 PROCEDURE — 94002 VENT MGMT INPAT INIT DAY: CPT

## 2024-01-01 PROCEDURE — 86923 COMPATIBILITY TEST ELECTRIC: CPT

## 2024-01-01 PROCEDURE — 87185 SC STD ENZYME DETCJ PER NZM: CPT | Performed by: SPECIALIST

## 2024-01-01 PROCEDURE — 80202 ASSAY OF VANCOMYCIN: CPT | Performed by: NURSE PRACTITIONER

## 2024-01-01 PROCEDURE — NC001 PR NO CHARGE: Performed by: PHYSICIAN ASSISTANT

## 2024-01-01 PROCEDURE — 0HB7XZZ EXCISION OF ABDOMEN SKIN, EXTERNAL APPROACH: ICD-10-PCS | Performed by: STUDENT IN AN ORGANIZED HEALTH CARE EDUCATION/TRAINING PROGRAM

## 2024-01-01 PROCEDURE — 99223 1ST HOSP IP/OBS HIGH 75: CPT | Performed by: SURGERY

## 2024-01-01 PROCEDURE — 36415 COLL VENOUS BLD VENIPUNCTURE: CPT | Performed by: EMERGENCY MEDICINE

## 2024-01-01 PROCEDURE — 99291 CRITICAL CARE FIRST HOUR: CPT | Performed by: EMERGENCY MEDICINE

## 2024-01-01 PROCEDURE — 05HY33Z INSERTION OF INFUSION DEVICE INTO UPPER VEIN, PERCUTANEOUS APPROACH: ICD-10-PCS | Performed by: ANESTHESIOLOGY

## 2024-01-01 PROCEDURE — 93005 ELECTROCARDIOGRAM TRACING: CPT

## 2024-01-01 PROCEDURE — 83935 ASSAY OF URINE OSMOLALITY: CPT

## 2024-01-01 PROCEDURE — 31500 INSERT EMERGENCY AIRWAY: CPT

## 2024-01-01 PROCEDURE — 87205 SMEAR GRAM STAIN: CPT | Performed by: SPECIALIST

## 2024-01-01 PROCEDURE — 82043 UR ALBUMIN QUANTITATIVE: CPT

## 2024-01-01 PROCEDURE — 82306 VITAMIN D 25 HYDROXY: CPT

## 2024-01-01 PROCEDURE — 97605 NEG PRS WND THER DME<=50SQCM: CPT | Performed by: CLINICAL NURSE SPECIALIST

## 2024-01-01 PROCEDURE — 30233N1 TRANSFUSION OF NONAUTOLOGOUS RED BLOOD CELLS INTO PERIPHERAL VEIN, PERCUTANEOUS APPROACH: ICD-10-PCS | Performed by: ANESTHESIOLOGY

## 2024-01-01 PROCEDURE — 96361 HYDRATE IV INFUSION ADD-ON: CPT

## 2024-01-01 PROCEDURE — 0DBU0ZZ EXCISION OF OMENTUM, OPEN APPROACH: ICD-10-PCS | Performed by: STUDENT IN AN ORGANIZED HEALTH CARE EDUCATION/TRAINING PROGRAM

## 2024-01-01 PROCEDURE — 49255 REMOVAL OF OMENTUM: CPT | Performed by: STUDENT IN AN ORGANIZED HEALTH CARE EDUCATION/TRAINING PROGRAM

## 2024-01-01 PROCEDURE — 83540 ASSAY OF IRON: CPT

## 2024-01-01 PROCEDURE — 97605 NEG PRS WND THER DME<=50SQCM: CPT

## 2024-01-01 PROCEDURE — 2W13X6Z COMPRESSION OF ABDOMINAL WALL USING PRESSURE DRESSING: ICD-10-PCS | Performed by: STUDENT IN AN ORGANIZED HEALTH CARE EDUCATION/TRAINING PROGRAM

## 2024-01-01 PROCEDURE — 82010 KETONE BODYS QUAN: CPT | Performed by: EMERGENCY MEDICINE

## 2024-01-01 PROCEDURE — 49002 REOPENING OF ABDOMEN: CPT | Performed by: STUDENT IN AN ORGANIZED HEALTH CARE EDUCATION/TRAINING PROGRAM

## 2024-01-01 PROCEDURE — 87070 CULTURE OTHR SPECIMN AEROBIC: CPT | Performed by: SPECIALIST

## 2024-01-01 PROCEDURE — 3E1M39Z IRRIGATION OF PERITONEAL CAVITY USING DIALYSATE, PERCUTANEOUS APPROACH: ICD-10-PCS | Performed by: ANESTHESIOLOGY

## 2024-01-01 PROCEDURE — 44120 REMOVAL OF SMALL INTESTINE: CPT | Performed by: SPECIALIST

## 2024-01-01 PROCEDURE — 87205 SMEAR GRAM STAIN: CPT

## 2024-01-01 PROCEDURE — 99223 1ST HOSP IP/OBS HIGH 75: CPT

## 2024-01-01 PROCEDURE — 99232 SBSQ HOSP IP/OBS MODERATE 35: CPT | Performed by: INTERNAL MEDICINE

## 2024-01-01 PROCEDURE — 82728 ASSAY OF FERRITIN: CPT

## 2024-01-01 PROCEDURE — 85025 COMPLETE CBC W/AUTO DIFF WBC: CPT

## 2024-01-01 PROCEDURE — 83970 ASSAY OF PARATHORMONE: CPT

## 2024-01-01 PROCEDURE — 97605 NEG PRS WND THER DME<=50SQCM: CPT | Performed by: SPECIALIST

## 2024-01-01 PROCEDURE — 85007 BL SMEAR W/DIFF WBC COUNT: CPT | Performed by: NURSE PRACTITIONER

## 2024-01-01 PROCEDURE — 87077 CULTURE AEROBIC IDENTIFY: CPT | Performed by: SPECIALIST

## 2024-01-01 PROCEDURE — 74177 CT ABD & PELVIS W/CONTRAST: CPT

## 2024-01-01 PROCEDURE — 94760 N-INVAS EAR/PLS OXIMETRY 1: CPT

## 2024-01-01 PROCEDURE — P9016 RBC LEUKOCYTES REDUCED: HCPCS

## 2024-01-01 PROCEDURE — 99223 1ST HOSP IP/OBS HIGH 75: CPT | Performed by: INTERNAL MEDICINE

## 2024-01-01 PROCEDURE — 87081 CULTURE SCREEN ONLY: CPT | Performed by: NURSE PRACTITIONER

## 2024-01-01 PROCEDURE — 82805 BLOOD GASES W/O2 SATURATION: CPT | Performed by: EMERGENCY MEDICINE

## 2024-01-01 PROCEDURE — 93306 TTE W/DOPPLER COMPLETE: CPT | Performed by: INTERNAL MEDICINE

## 2024-01-01 PROCEDURE — 86901 BLOOD TYPING SEROLOGIC RH(D): CPT | Performed by: NURSE PRACTITIONER

## 2024-01-01 PROCEDURE — 81001 URINALYSIS AUTO W/SCOPE: CPT

## 2024-01-01 PROCEDURE — 87086 URINE CULTURE/COLONY COUNT: CPT

## 2024-01-01 PROCEDURE — 84100 ASSAY OF PHOSPHORUS: CPT

## 2024-01-01 PROCEDURE — 99285 EMERGENCY DEPT VISIT HI MDM: CPT

## 2024-01-01 PROCEDURE — 94664 DEMO&/EVAL PT USE INHALER: CPT

## 2024-01-01 PROCEDURE — 87186 SC STD MICRODIL/AGAR DIL: CPT | Performed by: SPECIALIST

## 2024-01-01 PROCEDURE — 49255 REMOVAL OF OMENTUM: CPT | Performed by: PHYSICIAN ASSISTANT

## 2024-01-01 PROCEDURE — 83550 IRON BINDING TEST: CPT

## 2024-01-01 PROCEDURE — 83605 ASSAY OF LACTIC ACID: CPT

## 2024-01-01 PROCEDURE — 36620 INSERTION CATHETER ARTERY: CPT | Performed by: NURSE PRACTITIONER

## 2024-01-01 PROCEDURE — 99233 SBSQ HOSP IP/OBS HIGH 50: CPT

## 2024-01-01 RX ORDER — ETOMIDATE 2 MG/ML
20 INJECTION INTRAVENOUS ONCE
Status: COMPLETED | OUTPATIENT
Start: 2024-01-01 | End: 2024-01-01

## 2024-01-01 RX ORDER — ROCURONIUM BROMIDE 10 MG/ML
INJECTION, SOLUTION INTRAVENOUS AS NEEDED
Status: DISCONTINUED | OUTPATIENT
Start: 2024-01-01 | End: 2024-01-01

## 2024-01-01 RX ORDER — FENTANYL CITRATE 50 UG/ML
25 INJECTION, SOLUTION INTRAMUSCULAR; INTRAVENOUS EVERY 2 HOUR PRN
Refills: 0 | Status: DISCONTINUED | OUTPATIENT
Start: 2024-01-01 | End: 2024-01-01 | Stop reason: HOSPADM

## 2024-01-01 RX ORDER — MAGNESIUM SULFATE HEPTAHYDRATE 40 MG/ML
4 INJECTION, SOLUTION INTRAVENOUS ONCE
Status: COMPLETED | OUTPATIENT
Start: 2024-01-01 | End: 2024-01-01

## 2024-01-01 RX ORDER — ACETAMINOPHEN 325 MG/1
650 TABLET ORAL EVERY 6 HOURS PRN
Status: DISCONTINUED | OUTPATIENT
Start: 2024-01-01 | End: 2024-01-01

## 2024-01-01 RX ORDER — CALCIUM CHLORIDE 100 MG/ML
INJECTION INTRAVENOUS; INTRAVENTRICULAR AS NEEDED
Status: DISCONTINUED | OUTPATIENT
Start: 2024-01-01 | End: 2024-01-01

## 2024-01-01 RX ORDER — CALCIUM GLUCONATE 20 MG/ML
1 INJECTION, SOLUTION INTRAVENOUS ONCE
Status: COMPLETED | OUTPATIENT
Start: 2024-01-01 | End: 2024-01-01

## 2024-01-01 RX ORDER — ALBUMIN HUMAN 50 G/1000ML
SOLUTION INTRAVENOUS CONTINUOUS PRN
Status: DISCONTINUED | OUTPATIENT
Start: 2024-01-01 | End: 2024-01-01

## 2024-01-01 RX ORDER — VANCOMYCIN HYDROCHLORIDE 750 MG/150ML
10 INJECTION, SOLUTION INTRAVENOUS EVERY 12 HOURS
Status: DISCONTINUED | OUTPATIENT
Start: 2024-01-01 | End: 2024-01-01

## 2024-01-01 RX ORDER — DEXTROSE MONOHYDRATE 100 MG/ML
50 INJECTION, SOLUTION INTRAVENOUS CONTINUOUS
Status: DISCONTINUED | OUTPATIENT
Start: 2024-01-01 | End: 2024-01-01

## 2024-01-01 RX ORDER — SODIUM CHLORIDE, SODIUM GLUCONATE, SODIUM ACETATE, POTASSIUM CHLORIDE, MAGNESIUM CHLORIDE, SODIUM PHOSPHATE, DIBASIC, AND POTASSIUM PHOSPHATE .53; .5; .37; .037; .03; .012; .00082 G/100ML; G/100ML; G/100ML; G/100ML; G/100ML; G/100ML; G/100ML
1000 INJECTION, SOLUTION INTRAVENOUS ONCE
Status: COMPLETED | OUTPATIENT
Start: 2024-01-01 | End: 2024-01-01

## 2024-01-01 RX ORDER — ASPIRIN 81 MG/1
81 TABLET, CHEWABLE ORAL DAILY
Status: DISCONTINUED | OUTPATIENT
Start: 2024-01-01 | End: 2024-01-01

## 2024-01-01 RX ORDER — DEXTROSE MONOHYDRATE 25 G/50ML
INJECTION, SOLUTION INTRAVENOUS
Status: COMPLETED
Start: 2024-01-01 | End: 2024-01-01

## 2024-01-01 RX ORDER — FENTANYL CITRATE-0.9 % NACL/PF 10 MCG/ML
100 PLASTIC BAG, INJECTION (ML) INTRAVENOUS CONTINUOUS
Status: DISCONTINUED | OUTPATIENT
Start: 2024-01-01 | End: 2024-01-01 | Stop reason: HOSPADM

## 2024-01-01 RX ORDER — METRONIDAZOLE 500 MG/100ML
500 INJECTION, SOLUTION INTRAVENOUS EVERY 8 HOURS
Status: DISCONTINUED | OUTPATIENT
Start: 2024-01-01 | End: 2024-01-01

## 2024-01-01 RX ORDER — CALCIUM GLUCONATE 20 MG/ML
2 INJECTION, SOLUTION INTRAVENOUS ONCE
Status: COMPLETED | OUTPATIENT
Start: 2024-01-01 | End: 2024-01-01

## 2024-01-01 RX ORDER — INSULIN LISPRO 100 [IU]/ML
1-5 INJECTION, SOLUTION INTRAVENOUS; SUBCUTANEOUS
Status: DISCONTINUED | OUTPATIENT
Start: 2024-01-01 | End: 2024-01-01

## 2024-01-01 RX ORDER — ALBUMIN (HUMAN) 12.5 G/50ML
25 SOLUTION INTRAVENOUS EVERY 6 HOURS
Status: COMPLETED | OUTPATIENT
Start: 2024-01-01 | End: 2024-01-01

## 2024-01-01 RX ORDER — CEFAZOLIN SODIUM 1 G/3ML
INJECTION, POWDER, FOR SOLUTION INTRAMUSCULAR; INTRAVENOUS AS NEEDED
Status: DISCONTINUED | OUTPATIENT
Start: 2024-01-01 | End: 2024-01-01

## 2024-01-01 RX ORDER — ONDANSETRON 2 MG/ML
4 INJECTION INTRAMUSCULAR; INTRAVENOUS EVERY 6 HOURS PRN
Status: DISCONTINUED | OUTPATIENT
Start: 2024-01-01 | End: 2024-01-01

## 2024-01-01 RX ORDER — DEXTROSE MONOHYDRATE 25 G/50ML
50 INJECTION, SOLUTION INTRAVENOUS ONCE
Status: COMPLETED | OUTPATIENT
Start: 2024-01-01 | End: 2024-01-01

## 2024-01-01 RX ORDER — LORAZEPAM 2 MG/ML
1 INJECTION INTRAMUSCULAR
Status: DISCONTINUED | OUTPATIENT
Start: 2024-01-01 | End: 2024-01-01 | Stop reason: HOSPADM

## 2024-01-01 RX ORDER — LISINOPRIL 20 MG/1
20 TABLET ORAL DAILY
Status: DISCONTINUED | OUTPATIENT
Start: 2024-01-01 | End: 2024-01-01

## 2024-01-01 RX ORDER — HALOPERIDOL 5 MG/ML
0.5 INJECTION INTRAMUSCULAR EVERY 2 HOUR PRN
Status: DISCONTINUED | OUTPATIENT
Start: 2024-01-01 | End: 2024-01-01 | Stop reason: HOSPADM

## 2024-01-01 RX ORDER — VANCOMYCIN HYDROCHLORIDE 1 G/200ML
15 INJECTION, SOLUTION INTRAVENOUS
Status: DISCONTINUED | OUTPATIENT
Start: 2024-01-01 | End: 2024-01-01

## 2024-01-01 RX ORDER — VANCOMYCIN HYDROCHLORIDE 1 G/200ML
1000 INJECTION, SOLUTION INTRAVENOUS ONCE
Status: COMPLETED | OUTPATIENT
Start: 2024-01-01 | End: 2024-01-01

## 2024-01-01 RX ORDER — MIDAZOLAM HYDROCHLORIDE 2 MG/2ML
INJECTION, SOLUTION INTRAMUSCULAR; INTRAVENOUS
Status: COMPLETED
Start: 2024-01-01 | End: 2024-01-01

## 2024-01-01 RX ORDER — ACETAMINOPHEN 10 MG/ML
1000 INJECTION, SOLUTION INTRAVENOUS EVERY 8 HOURS PRN
Status: DISCONTINUED | OUTPATIENT
Start: 2024-01-01 | End: 2024-01-01

## 2024-01-01 RX ORDER — MAGNESIUM HYDROXIDE 1200 MG/15ML
LIQUID ORAL AS NEEDED
Status: DISCONTINUED | OUTPATIENT
Start: 2024-01-01 | End: 2024-01-01 | Stop reason: HOSPADM

## 2024-01-01 RX ORDER — MIDAZOLAM HYDROCHLORIDE 2 MG/2ML
2 INJECTION, SOLUTION INTRAMUSCULAR; INTRAVENOUS ONCE
Status: COMPLETED | OUTPATIENT
Start: 2024-01-01 | End: 2024-01-01

## 2024-01-01 RX ORDER — POTASSIUM CHLORIDE 29.8 MG/ML
40 INJECTION INTRAVENOUS ONCE
Status: COMPLETED | OUTPATIENT
Start: 2024-01-01 | End: 2024-01-01

## 2024-01-01 RX ORDER — FENTANYL CITRATE 50 UG/ML
INJECTION, SOLUTION INTRAMUSCULAR; INTRAVENOUS
Status: COMPLETED
Start: 2024-01-01 | End: 2024-01-01

## 2024-01-01 RX ORDER — FENTANYL CITRATE 50 UG/ML
INJECTION, SOLUTION INTRAMUSCULAR; INTRAVENOUS CONTINUOUS PRN
Status: DISCONTINUED | OUTPATIENT
Start: 2024-01-01 | End: 2024-01-01

## 2024-01-01 RX ORDER — CHLORHEXIDINE GLUCONATE ORAL RINSE 1.2 MG/ML
15 SOLUTION DENTAL EVERY 12 HOURS SCHEDULED
Status: DISCONTINUED | OUTPATIENT
Start: 2024-01-01 | End: 2024-01-01

## 2024-01-01 RX ORDER — SODIUM CHLORIDE 9 MG/ML
INJECTION, SOLUTION INTRAVENOUS CONTINUOUS PRN
Status: DISCONTINUED | OUTPATIENT
Start: 2024-01-01 | End: 2024-01-01

## 2024-01-01 RX ORDER — ALBUMIN (HUMAN) 12.5 G/50ML
50 SOLUTION INTRAVENOUS ONCE
Status: COMPLETED | OUTPATIENT
Start: 2024-01-01 | End: 2024-01-01

## 2024-01-01 RX ORDER — PROPOFOL 10 MG/ML
INJECTION, EMULSION INTRAVENOUS
Status: COMPLETED
Start: 2024-01-01 | End: 2024-01-01

## 2024-01-01 RX ORDER — SODIUM CHLORIDE, SODIUM LACTATE, POTASSIUM CHLORIDE, CALCIUM CHLORIDE 600; 310; 30; 20 MG/100ML; MG/100ML; MG/100ML; MG/100ML
INJECTION, SOLUTION INTRAVENOUS CONTINUOUS PRN
Status: DISCONTINUED | OUTPATIENT
Start: 2024-01-01 | End: 2024-01-01

## 2024-01-01 RX ORDER — PANTOPRAZOLE SODIUM 40 MG/10ML
40 INJECTION, POWDER, LYOPHILIZED, FOR SOLUTION INTRAVENOUS
Status: DISCONTINUED | OUTPATIENT
Start: 2024-01-01 | End: 2024-01-01

## 2024-01-01 RX ORDER — HYDROMORPHONE HCL IN WATER/PF 6 MG/30 ML
0.2 PATIENT CONTROLLED ANALGESIA SYRINGE INTRAVENOUS EVERY 4 HOURS PRN
Status: DISCONTINUED | OUTPATIENT
Start: 2024-01-01 | End: 2024-01-01

## 2024-01-01 RX ORDER — POTASSIUM CHLORIDE 14.9 MG/ML
INJECTION INTRAVENOUS CONTINUOUS PRN
Status: DISCONTINUED | OUTPATIENT
Start: 2024-01-01 | End: 2024-01-01

## 2024-01-01 RX ORDER — FENTANYL CITRATE 50 UG/ML
50 INJECTION, SOLUTION INTRAMUSCULAR; INTRAVENOUS
Refills: 0 | Status: DISCONTINUED | OUTPATIENT
Start: 2024-01-01 | End: 2024-01-01

## 2024-01-01 RX ORDER — ACETAMINOPHEN 10 MG/ML
1000 INJECTION, SOLUTION INTRAVENOUS EVERY 8 HOURS
Status: DISCONTINUED | OUTPATIENT
Start: 2024-01-01 | End: 2024-01-01

## 2024-01-01 RX ORDER — MIDAZOLAM HYDROCHLORIDE 2 MG/2ML
2 INJECTION, SOLUTION INTRAMUSCULAR; INTRAVENOUS EVERY 2 HOUR PRN
Status: DISCONTINUED | OUTPATIENT
Start: 2024-01-01 | End: 2024-01-01

## 2024-01-01 RX ORDER — SUCCINYLCHOLINE/SOD CL,ISO/PF 100 MG/5ML
100 SYRINGE (ML) INTRAVENOUS ONCE
Status: COMPLETED | OUTPATIENT
Start: 2024-01-01 | End: 2024-01-01

## 2024-01-01 RX ORDER — SODIUM CHLORIDE, SODIUM GLUCONATE, SODIUM ACETATE, POTASSIUM CHLORIDE, MAGNESIUM CHLORIDE, SODIUM PHOSPHATE, DIBASIC, AND POTASSIUM PHOSPHATE .53; .5; .37; .037; .03; .012; .00082 G/100ML; G/100ML; G/100ML; G/100ML; G/100ML; G/100ML; G/100ML
100 INJECTION, SOLUTION INTRAVENOUS CONTINUOUS
Status: DISCONTINUED | OUTPATIENT
Start: 2024-01-01 | End: 2024-01-01

## 2024-01-01 RX ORDER — LIDOCAINE HYDROCHLORIDE 20 MG/ML
1 JELLY TOPICAL ONCE
Status: DISCONTINUED | OUTPATIENT
Start: 2024-01-01 | End: 2024-01-01

## 2024-01-01 RX ORDER — VANCOMYCIN HYDROCHLORIDE 1 G/200ML
15 INJECTION, SOLUTION INTRAVENOUS DAILY PRN
Status: DISCONTINUED | OUTPATIENT
Start: 2024-01-01 | End: 2024-01-01

## 2024-01-01 RX ORDER — DIPHENHYDRAMINE HYDROCHLORIDE 50 MG/ML
25 INJECTION INTRAMUSCULAR; INTRAVENOUS ONCE
Status: DISCONTINUED | OUTPATIENT
Start: 2024-01-01 | End: 2024-01-01

## 2024-01-01 RX ORDER — HEPARIN SODIUM 5000 [USP'U]/ML
5000 INJECTION, SOLUTION INTRAVENOUS; SUBCUTANEOUS EVERY 8 HOURS SCHEDULED
Status: DISCONTINUED | OUTPATIENT
Start: 2024-01-01 | End: 2024-01-01

## 2024-01-01 RX ORDER — ACETAMINOPHEN 10 MG/ML
1000 INJECTION, SOLUTION INTRAVENOUS ONCE
Status: COMPLETED | OUTPATIENT
Start: 2024-01-01 | End: 2024-01-01

## 2024-01-01 RX ORDER — INSULIN LISPRO 100 [IU]/ML
1-5 INJECTION, SOLUTION INTRAVENOUS; SUBCUTANEOUS EVERY 6 HOURS SCHEDULED
Status: DISCONTINUED | OUTPATIENT
Start: 2024-01-01 | End: 2024-01-01

## 2024-01-01 RX ORDER — ESMOLOL HYDROCHLORIDE 10 MG/ML
INJECTION INTRAVENOUS AS NEEDED
Status: DISCONTINUED | OUTPATIENT
Start: 2024-01-01 | End: 2024-01-01

## 2024-01-01 RX ORDER — SODIUM CHLORIDE 9 MG/ML
150 INJECTION, SOLUTION INTRAVENOUS CONTINUOUS
Status: DISCONTINUED | OUTPATIENT
Start: 2024-01-01 | End: 2024-01-01

## 2024-01-01 RX ORDER — ACETAMINOPHEN 10 MG/ML
1000 INJECTION, SOLUTION INTRAVENOUS EVERY 6 HOURS PRN
Status: DISCONTINUED | OUTPATIENT
Start: 2024-01-01 | End: 2024-01-01

## 2024-01-01 RX ORDER — PROPOFOL 10 MG/ML
5-50 INJECTION, EMULSION INTRAVENOUS
Status: DISCONTINUED | OUTPATIENT
Start: 2024-01-01 | End: 2024-01-01

## 2024-01-01 RX ORDER — FENTANYL CITRATE 50 UG/ML
50 INJECTION, SOLUTION INTRAMUSCULAR; INTRAVENOUS ONCE
Refills: 0 | Status: COMPLETED | OUTPATIENT
Start: 2024-01-01 | End: 2024-01-01

## 2024-01-01 RX ADMIN — Medication 100 MG: at 03:16

## 2024-01-01 RX ADMIN — HEPARIN SODIUM 5000 UNITS: 5000 INJECTION, SOLUTION INTRAVENOUS; SUBCUTANEOUS at 21:11

## 2024-01-01 RX ADMIN — Medication 75 MCG/HR: at 12:47

## 2024-01-01 RX ADMIN — SODIUM BICARBONATE 50 MEQ: 84 INJECTION, SOLUTION INTRAVENOUS at 03:49

## 2024-01-01 RX ADMIN — SUGAMMADEX 150 MG: 100 INJECTION, SOLUTION INTRAVENOUS at 08:28

## 2024-01-01 RX ADMIN — CEFAZOLIN 2000 MG: 1 INJECTION, POWDER, FOR SOLUTION INTRAMUSCULAR; INTRAVENOUS at 09:47

## 2024-01-01 RX ADMIN — HEPARIN SODIUM 5000 UNITS: 5000 INJECTION, SOLUTION INTRAVENOUS; SUBCUTANEOUS at 05:55

## 2024-01-01 RX ADMIN — HEPARIN SODIUM 5000 UNITS: 5000 INJECTION, SOLUTION INTRAVENOUS; SUBCUTANEOUS at 22:59

## 2024-01-01 RX ADMIN — METRONIDAZOLE 500 MG: 500 INJECTION, SOLUTION INTRAVENOUS at 04:33

## 2024-01-01 RX ADMIN — PIPERACILLIN AND TAZOBACTAM 4.5 G: 36; 4.5 INJECTION, POWDER, FOR SOLUTION INTRAVENOUS at 02:35

## 2024-01-01 RX ADMIN — CALCIUM GLUCONATE 1 G: 20 INJECTION, SOLUTION INTRAVENOUS at 06:14

## 2024-01-01 RX ADMIN — ROCURONIUM BROMIDE 50 MG: 10 INJECTION, SOLUTION INTRAVENOUS at 03:40

## 2024-01-01 RX ADMIN — SODIUM CHLORIDE 125 ML/HR: 0.9 INJECTION, SOLUTION INTRAVENOUS at 17:11

## 2024-01-01 RX ADMIN — CEFAZOLIN SODIUM 2000 MG: 1 INJECTION, POWDER, FOR SOLUTION INTRAMUSCULAR; INTRAVENOUS at 08:15

## 2024-01-01 RX ADMIN — Medication 20000 ML: at 22:01

## 2024-01-01 RX ADMIN — METRONIDAZOLE 500 MG: 500 INJECTION, SOLUTION INTRAVENOUS at 05:45

## 2024-01-01 RX ADMIN — HEPARIN SODIUM 5000 UNITS: 5000 INJECTION, SOLUTION INTRAVENOUS; SUBCUTANEOUS at 05:38

## 2024-01-01 RX ADMIN — CHLORHEXIDINE GLUCONATE 0.12% ORAL RINSE 15 ML: 1.2 LIQUID ORAL at 21:36

## 2024-01-01 RX ADMIN — CALCIUM CHLORIDE 1 G: 100 INJECTION INTRAVENOUS; INTRAVENTRICULAR at 04:04

## 2024-01-01 RX ADMIN — CEFEPIME 1000 MG: 2 INJECTION, POWDER, FOR SOLUTION INTRAVENOUS at 05:39

## 2024-01-01 RX ADMIN — ACETAMINOPHEN 1000 MG: 10 INJECTION INTRAVENOUS at 21:10

## 2024-01-01 RX ADMIN — CHLORHEXIDINE GLUCONATE 0.12% ORAL RINSE 15 ML: 1.2 LIQUID ORAL at 09:38

## 2024-01-01 RX ADMIN — SODIUM CHLORIDE, SODIUM GLUCONATE, SODIUM ACETATE, POTASSIUM CHLORIDE, MAGNESIUM CHLORIDE, SODIUM PHOSPHATE, DIBASIC, AND POTASSIUM PHOSPHATE 1000 ML: .53; .5; .37; .037; .03; .012; .00082 INJECTION, SOLUTION INTRAVENOUS at 05:30

## 2024-01-01 RX ADMIN — VASOPRESSIN 0.04 UNITS/MIN: 20 INJECTION INTRAVENOUS at 03:30

## 2024-01-01 RX ADMIN — ETOMIDATE 20 MG: 2 INJECTION INTRAVENOUS at 03:15

## 2024-01-01 RX ADMIN — CHLORHEXIDINE GLUCONATE 0.12% ORAL RINSE 15 ML: 1.2 LIQUID ORAL at 21:00

## 2024-01-01 RX ADMIN — Medication 50 MCG/HR: at 02:48

## 2024-01-01 RX ADMIN — FENTANYL CITRATE 50 MCG: 50 INJECTION INTRAMUSCULAR; INTRAVENOUS at 12:52

## 2024-01-01 RX ADMIN — PROPOFOL 20 MCG/KG/MIN: 10 INJECTION, EMULSION INTRAVENOUS at 05:01

## 2024-01-01 RX ADMIN — INSULIN HUMAN 6 UNITS: 100 INJECTION, SOLUTION PARENTERAL at 13:41

## 2024-01-01 RX ADMIN — CHLORHEXIDINE GLUCONATE 0.12% ORAL RINSE 15 ML: 1.2 LIQUID ORAL at 08:03

## 2024-01-01 RX ADMIN — MIDAZOLAM 1 MG/HR: 5 INJECTION INTRAMUSCULAR; INTRAVENOUS at 02:54

## 2024-01-01 RX ADMIN — SODIUM CHLORIDE, SODIUM GLUCONATE, SODIUM ACETATE, POTASSIUM CHLORIDE, MAGNESIUM CHLORIDE, SODIUM PHOSPHATE, DIBASIC, AND POTASSIUM PHOSPHATE 100 ML/HR: .53; .5; .37; .037; .03; .012; .00082 INJECTION, SOLUTION INTRAVENOUS at 09:46

## 2024-01-01 RX ADMIN — FENTANYL CITRATE 100 MCG: 50 INJECTION, SOLUTION INTRAMUSCULAR; INTRAVENOUS at 03:10

## 2024-01-01 RX ADMIN — MIDAZOLAM 2 MG: 1 INJECTION INTRAMUSCULAR; INTRAVENOUS at 01:30

## 2024-01-01 RX ADMIN — SUGAMMADEX 150 MG: 100 INJECTION, SOLUTION INTRAVENOUS at 10:30

## 2024-01-01 RX ADMIN — CHLORHEXIDINE GLUCONATE 0.12% ORAL RINSE 15 ML: 1.2 LIQUID ORAL at 09:37

## 2024-01-01 RX ADMIN — NOREPINEPHRINE BITARTRATE 4 MCG/MIN: 1 SOLUTION INTRAVENOUS at 14:30

## 2024-01-01 RX ADMIN — MIDAZOLAM HYDROCHLORIDE 2 MG: 2 INJECTION, SOLUTION INTRAMUSCULAR; INTRAVENOUS at 02:13

## 2024-01-01 RX ADMIN — SODIUM BICARBONATE 100 MEQ: 84 INJECTION INTRAVENOUS at 01:43

## 2024-01-01 RX ADMIN — SODIUM CHLORIDE: 0.9 INJECTION, SOLUTION INTRAVENOUS at 03:44

## 2024-01-01 RX ADMIN — ALBUMIN (HUMAN) 25 G: 0.25 INJECTION, SOLUTION INTRAVENOUS at 12:43

## 2024-01-01 RX ADMIN — Medication 5 MCG/MIN: at 05:51

## 2024-01-01 RX ADMIN — ONDANSETRON 4 MG: 2 INJECTION INTRAMUSCULAR; INTRAVENOUS at 01:08

## 2024-01-01 RX ADMIN — SODIUM BICARBONATE 50 MEQ: 84 INJECTION INTRAVENOUS at 00:55

## 2024-01-01 RX ADMIN — SODIUM CHLORIDE, SODIUM LACTATE, POTASSIUM CHLORIDE, AND CALCIUM CHLORIDE 1000 ML: .6; .31; .03; .02 INJECTION, SOLUTION INTRAVENOUS at 04:59

## 2024-01-01 RX ADMIN — VASOPRESSIN 0.04 UNITS/MIN: 20 INJECTION INTRAVENOUS at 19:47

## 2024-01-01 RX ADMIN — DEXTROSE MONOHYDRATE 50 ML: 25 INJECTION, SOLUTION INTRAVENOUS at 18:33

## 2024-01-01 RX ADMIN — SODIUM CHLORIDE, SODIUM GLUCONATE, SODIUM ACETATE, POTASSIUM CHLORIDE, MAGNESIUM CHLORIDE, SODIUM PHOSPHATE, DIBASIC, AND POTASSIUM PHOSPHATE 1000 ML: .53; .5; .37; .037; .03; .012; .00082 INJECTION, SOLUTION INTRAVENOUS at 02:16

## 2024-01-01 RX ADMIN — SODIUM CHLORIDE, SODIUM LACTATE, POTASSIUM CHLORIDE, AND CALCIUM CHLORIDE: .6; .31; .03; .02 INJECTION, SOLUTION INTRAVENOUS at 09:29

## 2024-01-01 RX ADMIN — INSULIN LISPRO 2 UNITS: 100 INJECTION, SOLUTION INTRAVENOUS; SUBCUTANEOUS at 11:32

## 2024-01-01 RX ADMIN — METRONIDAZOLE 500 MG: 500 INJECTION, SOLUTION INTRAVENOUS at 21:00

## 2024-01-01 RX ADMIN — CALCIUM GLUCONATE 2 G: 20 INJECTION, SOLUTION INTRAVENOUS at 05:40

## 2024-01-01 RX ADMIN — HEPARIN SODIUM 5000 UNITS: 5000 INJECTION, SOLUTION INTRAVENOUS; SUBCUTANEOUS at 22:04

## 2024-01-01 RX ADMIN — Medication 20000 ML: at 14:35

## 2024-01-01 RX ADMIN — METRONIDAZOLE 500 MG: 500 INJECTION, SOLUTION INTRAVENOUS at 13:13

## 2024-01-01 RX ADMIN — IOHEXOL 100 ML: 350 INJECTION, SOLUTION INTRAVENOUS at 00:37

## 2024-01-01 RX ADMIN — CEFEPIME 1000 MG: 2 INJECTION, POWDER, FOR SOLUTION INTRAVENOUS at 05:07

## 2024-01-01 RX ADMIN — ALBUMIN (HUMAN): 12.5 INJECTION, SOLUTION INTRAVENOUS at 10:27

## 2024-01-01 RX ADMIN — VASOPRESSIN 0.04 UNITS/MIN: 20 INJECTION INTRAVENOUS at 03:04

## 2024-01-01 RX ADMIN — MIDAZOLAM 2 MG: 1 INJECTION INTRAMUSCULAR; INTRAVENOUS at 02:13

## 2024-01-01 RX ADMIN — HEPARIN SODIUM 5000 UNITS: 5000 INJECTION, SOLUTION INTRAVENOUS; SUBCUTANEOUS at 21:00

## 2024-01-01 RX ADMIN — POTASSIUM CHLORIDE 40 MEQ: 29.8 INJECTION, SOLUTION INTRAVENOUS at 09:38

## 2024-01-01 RX ADMIN — HEPARIN SODIUM 5000 UNITS: 5000 INJECTION, SOLUTION INTRAVENOUS; SUBCUTANEOUS at 05:24

## 2024-01-01 RX ADMIN — SODIUM CHLORIDE 125 ML/HR: 0.9 INJECTION, SOLUTION INTRAVENOUS at 04:03

## 2024-01-01 RX ADMIN — ALBUMIN (HUMAN): 12.5 INJECTION, SOLUTION INTRAVENOUS at 08:10

## 2024-01-01 RX ADMIN — CALCIUM GLUCONATE 1 G: 20 INJECTION, SOLUTION INTRAVENOUS at 08:11

## 2024-01-01 RX ADMIN — PANTOPRAZOLE SODIUM 40 MG: 40 INJECTION, POWDER, FOR SOLUTION INTRAVENOUS at 08:14

## 2024-01-01 RX ADMIN — ACETAMINOPHEN 1000 MG: 1000 INJECTION, SOLUTION INTRAVENOUS at 16:18

## 2024-01-01 RX ADMIN — VASOPRESSIN 0.04 UNITS/MIN: 20 INJECTION INTRAVENOUS at 12:30

## 2024-01-01 RX ADMIN — CALCIUM GLUCONATE 2 G: 20 INJECTION, SOLUTION INTRAVENOUS at 02:54

## 2024-01-01 RX ADMIN — DEXTROSE MONOHYDRATE 50 ML: 25 INJECTION, SOLUTION INTRAVENOUS at 06:53

## 2024-01-01 RX ADMIN — NOREPINEPHRINE BITARTRATE 2 MCG/MIN: 1 SOLUTION INTRAVENOUS at 16:18

## 2024-01-01 RX ADMIN — INSULIN LISPRO 1 UNITS: 100 INJECTION, SOLUTION INTRAVENOUS; SUBCUTANEOUS at 17:10

## 2024-01-01 RX ADMIN — ALBUMIN (HUMAN) 25 G: 0.25 INJECTION, SOLUTION INTRAVENOUS at 00:08

## 2024-01-01 RX ADMIN — CALCIUM GLUCONATE 2 G: 20 INJECTION, SOLUTION INTRAVENOUS at 17:10

## 2024-01-01 RX ADMIN — SODIUM CHLORIDE 2000 ML: 0.9 INJECTION, SOLUTION INTRAVENOUS at 14:54

## 2024-01-01 RX ADMIN — METRONIDAZOLE 500 MG: 500 INJECTION, SOLUTION INTRAVENOUS at 21:09

## 2024-01-01 RX ADMIN — HEPARIN SODIUM 5000 UNITS: 5000 INJECTION, SOLUTION INTRAVENOUS; SUBCUTANEOUS at 14:03

## 2024-01-01 RX ADMIN — SODIUM BICARBONATE 100 ML/HR: 84 INJECTION, SOLUTION INTRAVENOUS at 03:09

## 2024-01-01 RX ADMIN — SODIUM CHLORIDE 1000 ML: 0.9 INJECTION, SOLUTION INTRAVENOUS at 13:34

## 2024-01-01 RX ADMIN — DEXTROSE MONOHYDRATE 50 ML: 25 INJECTION, SOLUTION INTRAVENOUS at 01:05

## 2024-01-01 RX ADMIN — Medication 25 MCG/HR: at 04:44

## 2024-01-01 RX ADMIN — FENTANYL CITRATE 100 MCG: 50 INJECTION INTRAMUSCULAR; INTRAVENOUS at 03:10

## 2024-01-01 RX ADMIN — Medication 2.5 MG: at 17:09

## 2024-01-01 RX ADMIN — DEXTROSE 20 ML/HR: 10 SOLUTION INTRAVENOUS at 18:24

## 2024-01-01 RX ADMIN — SODIUM CHLORIDE, SODIUM GLUCONATE, SODIUM ACETATE, POTASSIUM CHLORIDE, MAGNESIUM CHLORIDE, SODIUM PHOSPHATE, DIBASIC, AND POTASSIUM PHOSPHATE 100 ML/HR: .53; .5; .37; .037; .03; .012; .00082 INJECTION, SOLUTION INTRAVENOUS at 06:23

## 2024-01-01 RX ADMIN — ROCURONIUM BROMIDE 50 MG: 10 INJECTION, SOLUTION INTRAVENOUS at 08:05

## 2024-01-01 RX ADMIN — Medication 32 MCG: at 03:43

## 2024-01-01 RX ADMIN — VASOPRESSIN 0.04 UNITS/MIN: 20 INJECTION INTRAVENOUS at 16:22

## 2024-01-01 RX ADMIN — VANCOMYCIN HYDROCHLORIDE 1000 MG: 1 INJECTION, SOLUTION INTRAVENOUS at 06:58

## 2024-01-01 RX ADMIN — HEPARIN SODIUM 5000 UNITS: 5000 INJECTION, SOLUTION INTRAVENOUS; SUBCUTANEOUS at 13:14

## 2024-01-01 RX ADMIN — ALBUMIN (HUMAN) 50 G: 0.25 INJECTION, SOLUTION INTRAVENOUS at 03:23

## 2024-01-01 RX ADMIN — MAGNESIUM SULFATE HEPTAHYDRATE 4 G: 40 INJECTION, SOLUTION INTRAVENOUS at 07:31

## 2024-01-01 RX ADMIN — HEPARIN SODIUM 5000 UNITS: 5000 INJECTION, SOLUTION INTRAVENOUS; SUBCUTANEOUS at 05:16

## 2024-01-01 RX ADMIN — Medication 16 MCG: at 03:37

## 2024-01-01 RX ADMIN — HYDROMORPHONE HYDROCHLORIDE 0.2 MG: 0.2 INJECTION, SOLUTION INTRAMUSCULAR; INTRAVENOUS; SUBCUTANEOUS at 21:18

## 2024-01-01 RX ADMIN — CALCIUM CHLORIDE 1 G: 100 INJECTION INTRAVENOUS; INTRAVENTRICULAR at 09:42

## 2024-01-01 RX ADMIN — ALBUMIN (HUMAN) 25 G: 0.25 INJECTION, SOLUTION INTRAVENOUS at 07:31

## 2024-01-01 RX ADMIN — VANCOMYCIN HYDROCHLORIDE 750 MG: 750 INJECTION, SOLUTION INTRAVENOUS at 11:09

## 2024-01-01 RX ADMIN — Medication 2.5 MG: at 06:34

## 2024-01-01 RX ADMIN — SODIUM CHLORIDE, SODIUM GLUCONATE, SODIUM ACETATE, POTASSIUM CHLORIDE, MAGNESIUM CHLORIDE, SODIUM PHOSPHATE, DIBASIC, AND POTASSIUM PHOSPHATE 1000 ML: .53; .5; .37; .037; .03; .012; .00082 INJECTION, SOLUTION INTRAVENOUS at 19:43

## 2024-01-01 RX ADMIN — ESMOLOL HYDROCHLORIDE 10 MG: 10 INJECTION, SOLUTION INTRAVENOUS at 08:21

## 2024-01-01 RX ADMIN — VANCOMYCIN HYDROCHLORIDE 1500 MG: 5 INJECTION, POWDER, LYOPHILIZED, FOR SOLUTION INTRAVENOUS at 06:12

## 2024-01-01 RX ADMIN — ROCURONIUM BROMIDE 50 MG: 10 INJECTION, SOLUTION INTRAVENOUS at 09:33

## 2024-01-01 RX ADMIN — ALBUMIN (HUMAN) 25 G: 0.25 INJECTION, SOLUTION INTRAVENOUS at 17:47

## 2024-01-01 RX ADMIN — ACETAMINOPHEN 1000 MG: 1000 INJECTION, SOLUTION INTRAVENOUS at 23:31

## 2024-01-01 RX ADMIN — VASOPRESSIN 0.04 UNITS/MIN: 20 INJECTION INTRAVENOUS at 11:23

## 2024-01-01 RX ADMIN — SODIUM CHLORIDE, SODIUM GLUCONATE, SODIUM ACETATE, POTASSIUM CHLORIDE, MAGNESIUM CHLORIDE, SODIUM PHOSPHATE, DIBASIC, AND POTASSIUM PHOSPHATE 100 ML/HR: .53; .5; .37; .037; .03; .012; .00082 INJECTION, SOLUTION INTRAVENOUS at 02:00

## 2024-01-01 RX ADMIN — Medication 2 MCG/MIN: at 01:14

## 2024-01-01 RX ADMIN — Medication 20000 ML: at 05:11

## 2024-01-01 RX ADMIN — SODIUM BICARBONATE 50 MEQ: 84 INJECTION INTRAVENOUS at 05:13

## 2024-01-01 RX ADMIN — SUGAMMADEX 200 MG: 100 INJECTION, SOLUTION INTRAVENOUS at 04:48

## 2024-01-01 RX ADMIN — ACETAMINOPHEN 1000 MG: 1000 INJECTION, SOLUTION INTRAVENOUS at 22:04

## 2024-01-01 RX ADMIN — SODIUM BICARBONATE 100 ML/HR: 84 INJECTION, SOLUTION INTRAVENOUS at 05:19

## 2024-01-01 RX ADMIN — HEPARIN SODIUM 5000 UNITS: 5000 INJECTION, SOLUTION INTRAVENOUS; SUBCUTANEOUS at 15:22

## 2024-01-01 RX ADMIN — POTASSIUM CHLORIDE: 14.9 INJECTION, SOLUTION INTRAVENOUS at 04:04

## 2024-01-01 RX ADMIN — PANTOPRAZOLE SODIUM 40 MG: 40 INJECTION, POWDER, FOR SOLUTION INTRAVENOUS at 09:37

## 2024-01-01 RX ADMIN — METRONIDAZOLE 500 MG: 500 INJECTION, SOLUTION INTRAVENOUS at 12:43

## 2024-01-01 RX ADMIN — METRONIDAZOLE 500 MG: 500 INJECTION, SOLUTION INTRAVENOUS at 05:13

## 2024-01-01 RX ADMIN — CEFEPIME 1000 MG: 2 INJECTION, POWDER, FOR SOLUTION INTRAVENOUS at 05:22

## 2024-01-01 RX ADMIN — PROPOFOL 20 MCG/KG/MIN: 10 INJECTION, EMULSION INTRAVENOUS at 12:47

## 2024-01-01 RX ADMIN — PANTOPRAZOLE SODIUM 40 MG: 40 INJECTION, POWDER, FOR SOLUTION INTRAVENOUS at 09:38

## 2024-01-01 RX ADMIN — HEPARIN SODIUM 5000 UNITS: 5000 INJECTION, SOLUTION INTRAVENOUS; SUBCUTANEOUS at 17:09

## 2024-01-01 RX ADMIN — SODIUM CHLORIDE, SODIUM GLUCONATE, SODIUM ACETATE, POTASSIUM CHLORIDE, MAGNESIUM CHLORIDE, SODIUM PHOSPHATE, DIBASIC, AND POTASSIUM PHOSPHATE 1000 ML: .53; .5; .37; .037; .03; .012; .00082 INJECTION, SOLUTION INTRAVENOUS at 00:48

## 2024-01-01 RX ADMIN — POTASSIUM CHLORIDE 40 MEQ: 29.8 INJECTION, SOLUTION INTRAVENOUS at 17:08

## 2024-02-07 DIAGNOSIS — M1A.0390 CHRONIC GOUT OF WRIST, UNSPECIFIED CAUSE, UNSPECIFIED LATERALITY: ICD-10-CM

## 2024-02-07 RX ORDER — ALLOPURINOL 100 MG/1
100 TABLET ORAL DAILY
Qty: 30 TABLET | Refills: 1 | Status: SHIPPED | OUTPATIENT
Start: 2024-02-07

## 2024-02-12 DIAGNOSIS — E11.29 TYPE 2 DIABETES MELLITUS WITH MICROALBUMINURIA, WITHOUT LONG-TERM CURRENT USE OF INSULIN: ICD-10-CM

## 2024-02-12 DIAGNOSIS — R80.9 TYPE 2 DIABETES MELLITUS WITH MICROALBUMINURIA, WITHOUT LONG-TERM CURRENT USE OF INSULIN: ICD-10-CM

## 2024-02-12 RX ORDER — NATEGLINIDE 60 MG/1
60 TABLET ORAL 2 TIMES DAILY WITH MEALS
Qty: 180 TABLET | Refills: 1 | Status: SHIPPED | OUTPATIENT
Start: 2024-02-12

## 2024-02-19 DIAGNOSIS — M1A.4710 OTHER SECONDARY CHRONIC GOUT OF RIGHT ANKLE WITHOUT TOPHUS: ICD-10-CM

## 2024-02-19 RX ORDER — COLCHICINE 0.6 MG/1
TABLET ORAL
Qty: 15 TABLET | Refills: 5 | Status: SHIPPED | OUTPATIENT
Start: 2024-02-19

## 2024-03-22 NOTE — PROGRESS NOTES
Tanisha Morrow 1940 female MRN: [id number]      ASSESSMENT/PLAN  Problem List Items Addressed This Visit        Cardiovascular and Mediastinum    Hypertension    Relevant Orders    Comprehensive metabolic panel    Lipid Panel with Direct LDL reflex       Endocrine    Type 2 diabetes mellitus with microalbuminuria, without long-term current use of insulin  - Primary    Relevant Orders    POCT hemoglobin A1c (Completed)    Hemoglobin A1C    Comprehensive metabolic panel    CBC and differential    Albumin / creatinine urine ratio    Lipid Panel with Direct LDL reflex       Orthopedic/Musculoskeletal    Gout    Relevant Medications    naproxen (Naprosyn) 500 mg tablet    Other Relevant Orders    Uric acid       Other    Iron deficiency    Relevant Orders    CBC and differential    Iron Panel (Includes Ferritin, Iron Sat%, Iron, and TIBC)    Hyperlipidemia, mixed    Relevant Orders    Comprehensive metabolic panel    Lipid Panel with Direct LDL reflex    Hypercalcemia    Relevant Orders    Comprehensive metabolic panel    PTH, intact    Phosphorus    Vitamin D 25 hydroxy     DM: A1c 7.5% (from 7.8)   HTN: Within goal   HLD: Update lipids   Gout: Acute flare in L wrist -- pt defers steroid pack, but agreeable to course of Naproxen. Encouraged to take with food and drink plenty of water. Also discussed monitoring flare up frequency, as we can consider increase in Allopurinol dosing if needed.   Hypercalcemia: Update labs as above       Future Appointments   Date Time Provider Department Center   6/27/2024  1:40 PM DO [facility]            SUBJECTIVE  CC: Diabetes (3 month follow up ) and Hand Pain (Left hand pain- patient believes its gout- very swollen and tender to the touch )      HPI:  Tanisha Morrow is a 83 y.o. female who presents with her niece for chronic follow up.     DM: Home sugars usually in 100s; denies hypoglycemic symptoms    HTN: Home BPs none   HLD: Not on statin   Gout:  Having a flare up in L hand for 2-3 weeks (started in elbow and then went into hand) -- her niece thinks the swelling is somewhat better, and redness is decreased, but the pain is still present   Hypercalcemia: Following with Endo       Review of Systems   Constitutional:  Negative for diaphoresis.   Eyes:  Negative for visual disturbance.   Respiratory:  Negative for cough and shortness of breath.    Cardiovascular:  Negative for chest pain, palpitations and leg swelling.   Gastrointestinal:  Negative for abdominal pain, blood in stool, constipation and diarrhea.   Endocrine: Negative for polyuria.   Genitourinary:  Negative for dysuria, hematuria and vaginal bleeding.   Musculoskeletal:  Positive for arthralgias and joint swelling.   Neurological:  Negative for dizziness, tremors and headaches.       Historical Information   The patient history was reviewed and updated as follows:    Past Medical History:   Diagnosis Date   • Diabetes mellitus (HCC)    • Hypertension    • Osteoporosis     pt denies on PT eval 20     Past Surgical History:   Procedure Laterality Date   •  SECTION       Family History   Problem Relation Age of Onset   • Diabetes Mother    • COPD Mother    • Substance Abuse Mother       Social History   Social History     Substance and Sexual Activity   Alcohol Use Never     Social History     Substance and Sexual Activity   Drug Use Never     Social History     Tobacco Use   Smoking Status Never   Smokeless Tobacco Never       Medications:     Current Outpatient Medications:   •  Alcohol Swabs (Pharmacist Choice Alcohol) PADS, Use as directed, Disp: , Rfl:   •  allopurinol (ZYLOPRIM) 100 mg tablet, TAKE 1 TABLET (100 MG TOTAL) BY MOUTH DAILY, Disp: 30 tablet, Rfl: 1  •  aspirin 81 mg chewable tablet, Chew 81 mg daily, Disp: , Rfl:   •  B Complex Vitamins (VITAMIN B COMPLEX PO), Take by mouth, Disp: , Rfl:   •  colchicine (COLCRYS) 0.6 mg tablet, TAKE 2 TABLETS BY MOUTH, THEN 1 HOUR  LATER TAKE 1 TABLET BY MOUTH AS NEEDED FOR ACUTE GOUT FLARE, Disp: 15 tablet, Rfl: 5  •  ferrous sulfate 325 (65 FE) MG EC tablet, TAKE 1 TABLET (324 MG TOTAL) BY MOUTH DAILY BEFORE BREAKFAST, Disp: 90 tablet, Rfl: 1  •  fluticasone (FLONASE) 50 mcg/act nasal spray, 1 spray into each nostril daily, Disp: 16 g, Rfl: 1  •  glucose blood test strip, MEDICARE B, Disp: , Rfl:   •  Lancet Devices (Adjustable Lancing Device) MISC, MEDICARE B, Disp: 1 each, Rfl: 1  •  LIFESCAN FINEPOINT LANCETS MISC, by Does not apply route daily, Disp: 100 each, Rfl: 3  •  lisinopril (ZESTRIL) 20 mg tablet, TAKE 1 TABLET (20 MG TOTAL) BY MOUTH DAILY, Disp: 90 tablet, Rfl: 1  •  metFORMIN (GLUCOPHAGE) 1000 MG tablet, TAKE 1 TABLET (1,000 MG TOTAL) BY MOUTH 2 (TWO) TIMES A DAY WITH MEALS, Disp: 180 tablet, Rfl: 1  •  [facility] Natural Products (Osteo Bi-Flex/5-Loxin Advanced) TABS, Take by mouth, Disp: , Rfl:   •  naproxen (Naprosyn) 500 mg tablet, Take 1 tablet (500 mg total) by mouth 2 (two) times a day with meals for 5 days, Disp: 10 tablet, Rfl: 0  •  nateglinide (STARLIX) 60 mg tablet, TAKE 1 TABLET (60 MG TOTAL) BY MOUTH 2 (TWO) TIMES A DAY WITH MEALS, Disp: 180 tablet, Rfl: 1  •  OneTouch Verio test strip, USE TO TEST BLOOD SUGAR DAILY AS DIRECTED, Disp: 100 each, Rfl: 3  No Known Allergies    OBJECTIVE    Vitals:   Vitals:    03/25/24 1453   BP: 136/82   Pulse: 95   Temp: 97.9 °F (36.6 °C)   SpO2: 98%   Weight: 63 kg (138 lb 14.4 oz)           Physical Exam  Vitals and nursing note reviewed.   Constitutional:       General: She is not in acute distress.     Appearance: Normal appearance.   HENT:      Head: Normocephalic and atraumatic.      Right Ear: Tympanic membrane, ear canal and external ear normal.      Left Ear: Tympanic membrane, ear canal and external ear normal.      Nose: Nose normal.      Mouth/Throat:      Mouth: Mucous membranes are moist.      Pharynx: No oropharyngeal exudate or posterior oropharyngeal erythema.   Eyes:       Conjunctiva/sclera: Conjunctivae normal.   Cardiovascular:      Rate and Rhythm: Normal rate and regular rhythm.   Pulmonary:      Effort: Pulmonary effort is normal. No respiratory distress.      Breath sounds: Normal breath sounds.   Abdominal:      General: Bowel sounds are normal. There is no distension.      Palpations: Abdomen is soft.      Tenderness: There is no abdominal tenderness.   Musculoskeletal:      Right lower leg: No edema.      Left lower leg: No edema.      Comments: Warmth, swelling and tenderness of L wrist   Lymphadenopathy:      Cervical: No cervical adenopathy.   Skin:     General: Skin is warm and dry.   Neurological:      Mental Status: She is alert.      Comments: Grossly intact   Psychiatric:         Mood and Affect: Mood normal.                    Raquel Rocha DO  [facility]   3/25/2024  3:29 PM

## 2024-03-25 ENCOUNTER — OFFICE VISIT (OUTPATIENT)
Dept: FAMILY MEDICINE CLINIC | Facility: CLINIC | Age: 84
End: 2024-03-25
Payer: COMMERCIAL

## 2024-03-25 VITALS
TEMPERATURE: 97.9 F | BODY MASS INDEX: 24.61 KG/M2 | WEIGHT: 138.9 LBS | OXYGEN SATURATION: 98 % | DIASTOLIC BLOOD PRESSURE: 82 MMHG | HEART RATE: 95 BPM | SYSTOLIC BLOOD PRESSURE: 136 MMHG

## 2024-03-25 DIAGNOSIS — I10 PRIMARY HYPERTENSION: ICD-10-CM

## 2024-03-25 DIAGNOSIS — E61.1 IRON DEFICIENCY: ICD-10-CM

## 2024-03-25 DIAGNOSIS — E83.52 HYPERCALCEMIA: ICD-10-CM

## 2024-03-25 DIAGNOSIS — R80.9 TYPE 2 DIABETES MELLITUS WITH MICROALBUMINURIA, WITHOUT LONG-TERM CURRENT USE OF INSULIN (HCC): Primary | ICD-10-CM

## 2024-03-25 DIAGNOSIS — M1A.0390 CHRONIC GOUT OF WRIST, UNSPECIFIED CAUSE, UNSPECIFIED LATERALITY: ICD-10-CM

## 2024-03-25 DIAGNOSIS — E11.29 TYPE 2 DIABETES MELLITUS WITH MICROALBUMINURIA, WITHOUT LONG-TERM CURRENT USE OF INSULIN (HCC): Primary | ICD-10-CM

## 2024-03-25 DIAGNOSIS — E78.2 HYPERLIPIDEMIA, MIXED: ICD-10-CM

## 2024-03-25 LAB — SL AMB POCT HEMOGLOBIN AIC: 7.5 (ref ?–6.5)

## 2024-03-25 PROCEDURE — 99214 OFFICE O/P EST MOD 30 MIN: CPT | Performed by: FAMILY MEDICINE

## 2024-03-25 PROCEDURE — 83036 HEMOGLOBIN GLYCOSYLATED A1C: CPT | Performed by: FAMILY MEDICINE

## 2024-03-25 RX ORDER — NAPROXEN 500 MG/1
500 TABLET ORAL 2 TIMES DAILY WITH MEALS
Qty: 10 TABLET | Refills: 0 | Status: SHIPPED | OUTPATIENT
Start: 2024-03-25 | End: 2024-03-30

## 2024-04-01 DIAGNOSIS — R80.9 TYPE 2 DIABETES MELLITUS WITH MICROALBUMINURIA, WITHOUT LONG-TERM CURRENT USE OF INSULIN (HCC): ICD-10-CM

## 2024-04-01 DIAGNOSIS — E11.29 TYPE 2 DIABETES MELLITUS WITH MICROALBUMINURIA, WITHOUT LONG-TERM CURRENT USE OF INSULIN (HCC): ICD-10-CM

## 2024-04-09 ENCOUNTER — APPOINTMENT (OUTPATIENT)
Dept: RADIOLOGY | Facility: CLINIC | Age: 84
End: 2024-04-09
Payer: COMMERCIAL

## 2024-04-09 ENCOUNTER — TELEPHONE (OUTPATIENT)
Dept: FAMILY MEDICINE CLINIC | Facility: CLINIC | Age: 84
End: 2024-04-09

## 2024-04-09 ENCOUNTER — OFFICE VISIT (OUTPATIENT)
Dept: FAMILY MEDICINE CLINIC | Facility: CLINIC | Age: 84
End: 2024-04-09
Payer: COMMERCIAL

## 2024-04-09 VITALS
HEIGHT: 63 IN | DIASTOLIC BLOOD PRESSURE: 98 MMHG | HEART RATE: 101 BPM | TEMPERATURE: 97.1 F | WEIGHT: 144 LBS | OXYGEN SATURATION: 99 % | BODY MASS INDEX: 25.52 KG/M2 | SYSTOLIC BLOOD PRESSURE: 153 MMHG

## 2024-04-09 DIAGNOSIS — M1A.0390 CHRONIC GOUT OF WRIST, UNSPECIFIED CAUSE, UNSPECIFIED LATERALITY: ICD-10-CM

## 2024-04-09 DIAGNOSIS — M25.432 SWELLING OF LEFT WRIST: ICD-10-CM

## 2024-04-09 DIAGNOSIS — M25.532 LEFT WRIST PAIN: Primary | ICD-10-CM

## 2024-04-09 DIAGNOSIS — M25.532 LEFT WRIST PAIN: ICD-10-CM

## 2024-04-09 PROCEDURE — G2211 COMPLEX E/M VISIT ADD ON: HCPCS | Performed by: FAMILY MEDICINE

## 2024-04-09 PROCEDURE — 73110 X-RAY EXAM OF WRIST: CPT

## 2024-04-09 PROCEDURE — 99214 OFFICE O/P EST MOD 30 MIN: CPT | Performed by: FAMILY MEDICINE

## 2024-04-09 RX ORDER — ALLOPURINOL 100 MG/1
100 TABLET ORAL DAILY
Qty: 30 TABLET | Refills: 1 | Status: SHIPPED | OUTPATIENT
Start: 2024-04-09 | End: 2024-04-09

## 2024-04-09 RX ORDER — ALLOPURINOL 200 MG/1
200 TABLET ORAL DAILY
Qty: 90 TABLET | Refills: 1 | Status: SHIPPED | OUTPATIENT
Start: 2024-04-09

## 2024-04-09 RX ORDER — NAPROXEN 500 MG/1
500 TABLET ORAL 2 TIMES DAILY WITH MEALS
Qty: 10 TABLET | Refills: 0 | Status: SHIPPED | OUTPATIENT
Start: 2024-04-09 | End: 2024-04-14

## 2024-04-09 NOTE — TELEPHONE ENCOUNTER
Spoke with Sujatha about this -- she has 100 mg at home, so sent in additional 100 mg to add to what she has at home. I can changed to one 200 mg tab instead if they prefer

## 2024-04-09 NOTE — TELEPHONE ENCOUNTER
"An associate from [facility] left a message about the patient's allopurinol. He states, \"We bubble pack this person's medications. The patient came in saying that they're now on allopurinol, 200 milligrams milligrams a day. What we've had and what we received today is 100 milligrams today. So could someone either call us or send us a new prescription, for 100 milligrams twice a day or 200 milligrams once a day.\"  "

## 2024-04-09 NOTE — PROGRESS NOTES
Tanisha Morrow 1940 female MRN: [id number]      ASSESSMENT/PLAN  Problem List Items Addressed This Visit        Orthopedic/Musculoskeletal    Gout    Relevant Medications    naproxen (Naprosyn) 500 mg tablet    allopurinol (ZYLOPRIM) 100 mg tablet   Other Visit Diagnoses     Left wrist pain    -  Primary    Relevant Orders    XR wrist 3+ vw left    Swelling of left wrist        Relevant Orders    XR wrist 3+ vw left        Given pt has persistent swelling/malformation of L wrist and now reports fall, will XR to r/o bony changes/fracture. If benign, start another course of Naproxen to decrease acute symptoms, then increase Allopurinol to 200 mg daily to decrease likelihood of recurrence. Naproxen should help with elbow pain as well. If XR shows concerns for poorly healed fracture, would benefit from Ortho Hand evaluation.       Future Appointments   Date Time Provider Department Center   6/27/2024  1:40 PM DO [facility]          SUBJECTIVE  CC: Wrist Pain (Left wrist, swollen ) and Elbow Pain (Has had for about a week)      HPI:  Tanisha Morrow is a 83 y.o. female who presents with her niece due to left wrist and elbow pain.     Pt had gout flare in L wrist at visit on 3/25 -- pt deferred Medrol, but was given Naproxen which she finished about 1.5 weeks ago. Initially had improvement in symptoms, but about 2 days ago started developing swelling/pain/warmth again   Pt notes she fell about 2 months -- did not previously tell provider this; however, she notes the lump on her wrist, which was initially thought to be swelling in setting of gout, did not change with initial improvement in symptoms, and now there is concern for a possible fracture from that fall that healed inappropriately.      Also has B/L elbow pain for the past few weeks     Review of Systems   Musculoskeletal:  Positive for arthralgias and joint swelling.       Historical Information   The patient history was reviewed and  updated as follows:    Past Medical History:   Diagnosis Date   • Diabetes mellitus (HCC)    • Hypertension    • Osteoporosis     pt denies on PT eval 20     Past Surgical History:   Procedure Laterality Date   •  SECTION       Family History   Problem Relation Age of Onset   • Diabetes Mother    • COPD Mother    • Substance Abuse Mother       Social History   Social History     Substance and Sexual Activity   Alcohol Use Never     Social History     Substance and Sexual Activity   Drug Use Never     Social History     Tobacco Use   Smoking Status Never   Smokeless Tobacco Never       Medications:     Current Outpatient Medications:   •  allopurinol (ZYLOPRIM) 100 mg tablet, Take 1 tablet (100 mg total) by mouth daily, Disp: 30 tablet, Rfl: 1  •  naproxen (Naprosyn) 500 mg tablet, Take 1 tablet (500 mg total) by mouth 2 (two) times a day with meals for 5 days, Disp: 10 tablet, Rfl: 0  •  Alcohol Swabs (Pharmacist Choice Alcohol) PADS, Use as directed, Disp: , Rfl:   •  allopurinol (ZYLOPRIM) 100 mg tablet, TAKE 1 TABLET (100 MG TOTAL) BY MOUTH DAILY, Disp: 30 tablet, Rfl: 1  •  aspirin 81 mg chewable tablet, Chew 81 mg daily, Disp: , Rfl:   •  B Complex Vitamins (VITAMIN B COMPLEX PO), Take by mouth, Disp: , Rfl:   •  colchicine (COLCRYS) 0.6 mg tablet, TAKE 2 TABLETS BY MOUTH, THEN 1 HOUR LATER TAKE 1 TABLET BY MOUTH AS NEEDED FOR ACUTE GOUT FLARE, Disp: 15 tablet, Rfl: 5  •  ferrous sulfate 325 (65 FE) MG EC tablet, TAKE 1 TABLET (324 MG TOTAL) BY MOUTH DAILY BEFORE BREAKFAST, Disp: 90 tablet, Rfl: 1  •  fluticasone (FLONASE) 50 mcg/act nasal spray, 1 spray into each nostril daily, Disp: 16 g, Rfl: 1  •  glucose blood test strip, MEDICARE B, Disp: , Rfl:   •  Lancet Devices (Adjustable Lancing Device) MISC, MEDICARE B, Disp: 1 each, Rfl: 1  •  LIFESCAN FINEPOINT LANCETS MISC, by Does not apply route daily, Disp: 100 each, Rfl: 3  •  lisinopril (ZESTRIL) 20 mg tablet, TAKE 1 TABLET (20 MG TOTAL) BY  "MOUTH DAILY, Disp: 90 tablet, Rfl: 1  •  metFORMIN (GLUCOPHAGE) 1000 MG tablet, TAKE 1 TABLET (1,000 MG TOTAL) BY MOUTH 2 (TWO) TIMES A DAY WITH MEALS, Disp: 180 tablet, Rfl: 1  •  Misc Natural Products (Osteo Bi-Flex/5-Loxin Advanced) TABS, Take by mouth, Disp: , Rfl:   •  nateglinide (STARLIX) 60 mg tablet, TAKE 1 TABLET (60 MG TOTAL) BY MOUTH 2 (TWO) TIMES A DAY WITH MEALS, Disp: 180 tablet, Rfl: 1  •  OneTouch Verio test strip, USE TO TEST BLOOD SUGAR DAILY AS DIRECTED, Disp: 100 each, Rfl: 3  No Known Allergies    OBJECTIVE    Vitals:   Vitals:    04/09/24 0842   BP: 153/98   Pulse: 101   Temp: (!) 97.1 °F (36.2 °C)   SpO2: 99%   Weight: 65.3 kg (144 lb)   Height: 5' 3\" (1.6 m)           Physical Exam  Vitals and nursing note reviewed.   Constitutional:       General: She is not in acute distress.     Appearance: Normal appearance.   HENT:      Head: Normocephalic and atraumatic.   Pulmonary:      Effort: Pulmonary effort is normal. No respiratory distress.   Musculoskeletal:      Comments: L wrist  Decreased ROM due to pain   Subcutaneous lump on dorsum of wrist, tender to palpation   Wrist warm to touch, though minimal erythema   Some swelling into dorsum of hand     B/L elbow ROM intact without pain   Tender to palpation over medial epicondyles    Neurological:      Mental Status: She is alert.      Comments: Grossly intact    Psychiatric:         Mood and Affect: Mood normal.                    Raquel Rocha DO  [facility]   4/9/2024  9:19 AM    "

## 2024-04-17 DIAGNOSIS — M25.432 PAIN AND SWELLING OF LEFT WRIST: ICD-10-CM

## 2024-04-17 DIAGNOSIS — M25.532 PAIN AND SWELLING OF LEFT WRIST: ICD-10-CM

## 2024-04-17 DIAGNOSIS — Z87.81 HISTORY OF WRIST FRACTURE: ICD-10-CM

## 2024-04-17 DIAGNOSIS — M25.532 LEFT WRIST PAIN: Primary | ICD-10-CM

## 2024-05-16 ENCOUNTER — OFFICE VISIT (OUTPATIENT)
Dept: OBGYN CLINIC | Facility: MEDICAL CENTER | Age: 84
End: 2024-05-16
Payer: COMMERCIAL

## 2024-05-16 VITALS
HEIGHT: 63 IN | BODY MASS INDEX: 25.52 KG/M2 | SYSTOLIC BLOOD PRESSURE: 153 MMHG | WEIGHT: 144 LBS | DIASTOLIC BLOOD PRESSURE: 75 MMHG | HEART RATE: 87 BPM

## 2024-05-16 DIAGNOSIS — M25.432 PAIN AND SWELLING OF LEFT WRIST: Primary | ICD-10-CM

## 2024-05-16 DIAGNOSIS — M25.532 PAIN AND SWELLING OF LEFT WRIST: Primary | ICD-10-CM

## 2024-05-16 DIAGNOSIS — Z87.81 HISTORY OF WRIST FRACTURE: ICD-10-CM

## 2024-05-16 LAB
APPEARANCE FLD: ABNORMAL
COLOR FLD: ABNORMAL
CRYSTALS SNV QL MICRO: NORMAL
EOSINOPHIL NFR FLD MANUAL: 1 %
LYMPHOCYTES NFR BLD AUTO: 1 %
MONOCYTES NFR BLD AUTO: 8 %
NEUTS BAND NFR FLD MANUAL: 5 %
NEUTS SEG NFR BLD AUTO: 85 %
SITE: ABNORMAL
TOTAL CELLS COUNTED SPEC: 100
WBC # FLD MANUAL: 7564 /UL

## 2024-05-16 PROCEDURE — 20605 DRAIN/INJ JOINT/BURSA W/O US: CPT | Performed by: ORTHOPAEDIC SURGERY

## 2024-05-16 PROCEDURE — 89060 EXAM SYNOVIAL FLUID CRYSTALS: CPT

## 2024-05-16 PROCEDURE — 87205 SMEAR GRAM STAIN: CPT

## 2024-05-16 PROCEDURE — 99204 OFFICE O/P NEW MOD 45 MIN: CPT | Performed by: ORTHOPAEDIC SURGERY

## 2024-05-16 PROCEDURE — 87070 CULTURE OTHR SPECIMN AEROBIC: CPT

## 2024-05-16 PROCEDURE — 89051 BODY FLUID CELL COUNT: CPT

## 2024-05-16 RX ORDER — BETAMETHASONE SODIUM PHOSPHATE AND BETAMETHASONE ACETATE 3; 3 MG/ML; MG/ML
6 INJECTION, SUSPENSION INTRA-ARTICULAR; INTRALESIONAL; INTRAMUSCULAR; SOFT TISSUE
Status: COMPLETED | OUTPATIENT
Start: 2024-05-16 | End: 2024-05-16

## 2024-05-16 RX ORDER — BUPIVACAINE HYDROCHLORIDE 2.5 MG/ML
2 INJECTION, SOLUTION INFILTRATION; PERINEURAL
Status: COMPLETED | OUTPATIENT
Start: 2024-05-16 | End: 2024-05-16

## 2024-05-16 RX ORDER — LIDOCAINE HYDROCHLORIDE 10 MG/ML
2 INJECTION, SOLUTION INFILTRATION; PERINEURAL
Status: COMPLETED | OUTPATIENT
Start: 2024-05-16 | End: 2024-05-16

## 2024-05-16 RX ADMIN — LIDOCAINE HYDROCHLORIDE 2 ML: 10 INJECTION, SOLUTION INFILTRATION; PERINEURAL at 10:30

## 2024-05-16 RX ADMIN — BUPIVACAINE HYDROCHLORIDE 2 ML: 2.5 INJECTION, SOLUTION INFILTRATION; PERINEURAL at 10:30

## 2024-05-16 RX ADMIN — BETAMETHASONE SODIUM PHOSPHATE AND BETAMETHASONE ACETATE 6 MG: 3; 3 INJECTION, SUSPENSION INTRA-ARTICULAR; INTRALESIONAL; INTRAMUSCULAR; SOFT TISSUE at 10:30

## 2024-05-16 NOTE — PROGRESS NOTES
"The HAND & UPPER EXTREMITY OFFICE VISIT   Referred By:  Raquel Aj, Do  [id number]  [address]  [place] [address]      Chief Complaint:     Left wrist pain    History of Present Illness:   83 y.o., right hand dominant female presents with 2 months of significant left wrist pain and swelling.  Presents with her family member today who help provide the history.  She states that she initially broke her wrist as a young woman and that it was \"not set right\".  She has had deformity of the wrist but minimal discomfort over the years.  2 months ago she had an atraumatic significant flare in left wrist pain and swelling.  She does have a history of gout so they presumed that this was it was.  She saw her PCP for evaluation and her last 2 notes were reviewed.  She has had continued symptoms despite treatment with colchicine and naproxen.  She also takes allopurinol at baseline.  Radiographs were taken in the office.  Patient was referred to me for subspecialty evaluation.    Today she complains of severe left-sided wrist pain.  She states the pain waxes and wanes.  The swelling also waxes and wanes but never decreases less than it is currently.  She has a well-circumscribed area of swelling on the back of the wrist.  Is very tender to the touch.  Denies any recent injuries.  She has not trialed immobilization or bracing.  Has not had an aspiration or injection.          Past Medical History:  Past Medical History:   Diagnosis Date    Diabetes mellitus (HCC)     Hypertension     Osteoporosis     pt denies on PT eval 20     Past Surgical History:   Procedure Laterality Date     SECTION       Family History   Problem Relation Age of Onset    Diabetes Mother     COPD Mother     Substance Abuse Mother      Social History     Socioeconomic History    Marital status: Unknown     Spouse name: Not on file    Number of children: Not on file    Years of education: Not on file    Highest education level: Not on " "file   Occupational History    Occupation: retired from [employer]   Tobacco Use    Smoking status: Never    Smokeless tobacco: Never   Vaping Use    Vaping status: Never Used   Substance and Sexual Activity    Alcohol use: Never    Drug use: Never    Sexual activity: Not Currently   Other Topics Concern    Not on file   Social History Narrative    Not on file     Social Determinants of Health     Financial Resource Strain: Low Risk  (8/29/2023)    Overall Financial Resource Strain (CARDIA)     Difficulty of Paying Living Expenses: Not very hard   Food Insecurity: Not on file   Transportation Needs: No Transportation Needs (8/29/2023)    PRAPARE - Transportation     Lack of Transportation (Medical): No     Lack of Transportation (Non-Medical): No   Physical Activity: Not on file   Stress: Not on file   Social Connections: Not on file   Intimate Partner Violence: Not on file   Housing Stability: Not on file     Scheduled Meds:  Continuous Infusions:No current facility-administered medications for this visit.    PRN Meds:.  No Known Allergies        Physical Examination:    /75   Pulse 87   Ht 5' 3\" (1.6 m)   Wt 65.3 kg (144 lb)   BMI 25.51 kg/m²     Gen: A&Ox3, NAD  Cardiac: regular rate  Chest: non labored breathing  Abdomen: Non-distended    Cervcial Spine: Negative Spurling's    Right Upper Extremity:  Skin CDI  No obvious deformity of the shoulder, arm, elbow, forearm, wrist, hand  Nontender  Full wrist and finger range of motion with a composite fist  Sensation intact to light touch in the axillary median, ulnar, and radial nerve distributions  5/5 motor wrist flexion extension  2+RP      Left Upper Extremity:  Skin CDI  Significant swelling over the dorsal aspect of the wrist joint, no significant diffuse soft tissue swelling distally or proximally  She has a extension deformity of distal radius and prominent distal ulna related to her prior injury.  She is able to make a loose fist but limited by " swelling and discomfort.  Her wrist range of motion is approximately 45 degree arc before she has significant pain and cannot tolerate it.  Sensation intact to light touch in the axillary median, ulnar, and radial nerve distributions  Strength testing deferred  2+RP      Studies:  Radiographs: I personally reviewed and independently interpreted the available radiographs.  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  There are arthritic changes along the radiocarpal joint primarily but also within the midcarpal joint and base of the thumb CMC joint.  There is mild arthritic changes of the DRUJ.       Assessment and Plan:  1. Pain and swelling of left wrist  Ambulatory Referral to Orthopedic Surgery    Body fluid culture and Gram stain    Body fluid white cell count with differential    Synovial fluid, crystal    Anaerobic culture and Gram stain    Body fluid culture and Gram stain    Body fluid white cell count with differential    Synovial fluid, crystal      2. History of wrist fracture  Ambulatory Referral to Orthopedic Surgery          83 y.o. female presents with signs and symptoms consistent with the above diagnosis.  We discussed the natural history of this condition and its pathogenesis.  We discussed operative and nonoperative treatment options.    On exam she has swelling that is fairly well-circumscribed and centered at the dorsal radiocarpal joint.  I think the differential includes posttraumatic arthritis flare, gout/pseudogout or very less likely septic arthritis.  She does not have fevers or chills or other systemic signs of infection and I think her clinical exam is not consistent with septic arthritis but to be certain I recommended obtaining an aspirate of the wrist.  Will also be able to evaluate for  gout on this aspiration.      Based on the appearance of the aspirate we can consider doing a corticosteroid injection to the radiocarpal joint as well today.  She is agree with this plan.    The wrist aspirations were performed in the office today with a gelatinous synovial appearing fluid most consistent with arthritis or ganglion cyst.  Sample was sent for a cell count/crystals as well as a culture.  Given the benign appearance and low concern for infection a corticosteroid injection was then administered into the radiocarpal joint.  Please see procedure note for further details.    She was given order for a custom volar wrist brace as I think the off-the-shelf ones will be too uncomfortable her given her swelling and not fit properly.    I will call her once we get culture results to discuss further treatment options.    she expressed understanding of the plan and agreed. We encouraged them to contact our office with any questions or concerns.         Rojelio Mcdonald MD  Hand and Upper Extremity Surgery        *This note was dictated using Dragon voice recognition software. Please excuse any word substitutions or errors.*

## 2024-05-16 NOTE — PROGRESS NOTES
Medium joint arthrocentesis: L radiocarpal  Universal Protocol:  Consent: Verbal consent obtained.  Risks and benefits: risks, benefits and alternatives were discussed  Consent given by: patient  Patient identity confirmed: verbally with patient  Supporting Documentation  Indications: pain, joint swelling and diagnostic evaluation   Procedure Details  Location: wrist - L radiocarpal  Preparation: Patient was prepped and draped in the usual sterile fashion  Needle size: 18 G  Ultrasound guidance: no  Approach: dorsal  Medications administered: 2 mL lidocaine 1 %; 6 mg betamethasone acetate-betamethasone sodium phosphate 6 (3-3) mg/mL; 2 mL bupivacaine 0.25 %    Aspirate amount: 2 mL  Aspirate: yellow  Analysis: fluid sample sent for laboratory analysis  Patient tolerance: patient tolerated the procedure well with no immediate complications  Dressing:  Sterile dressing applied    Procedure: Aspiration and corticosteroid Injection  Wrist Joint     The procedure was performed under sterile conditions after verbal informed consent was obtained from the patient and the correct site was confirmed with the patient.      The dorsal wrist skin was prepped with ChloraPrep and 2 cc of lidocaine was administered subcutaneously for anesthetic.  Once properly anesthetized an 18-gauge needle was inserted into the primary dorsal swelling and then into the dorsal radiocarpal joint.  A small amount of gelatinous yellow fluid was aspirated, approximately 2 cc.  I was unable to aspirate any further fluid from the joint or the dorsal wrist mass make me assume that it was very thickened and not flowing well.  The aspirate was sent for evaluation for crystals, cell count and culture.  Utilizing the same needle a corticosteroid injection was administered into the radiocarpal joint with 2 cc of 0.25% bupivacaine and 6 mg of betamethasone.      The patient tolerated the injection well and was discharged without complication.  Post-injection  protocol for activity modification, anti-inflammatory measures, and follow-up was reviewed with the patient.

## 2024-05-19 LAB
BACTERIA SPEC BFLD CULT: NO GROWTH
GRAM STN SPEC: NORMAL

## 2024-05-21 DIAGNOSIS — E61.1 IRON DEFICIENCY: ICD-10-CM

## 2024-05-21 RX ORDER — UREA 10 %
1 LOTION (ML) TOPICAL
Qty: 90 TABLET | Refills: 1 | Status: SHIPPED | OUTPATIENT
Start: 2024-05-21

## 2024-05-29 DIAGNOSIS — M1A.4710 OTHER SECONDARY CHRONIC GOUT OF RIGHT ANKLE WITHOUT TOPHUS: ICD-10-CM

## 2024-05-29 RX ORDER — COLCHICINE 0.6 MG/1
TABLET ORAL
Qty: 15 TABLET | Refills: 5 | Status: SHIPPED | OUTPATIENT
Start: 2024-05-29

## 2024-05-30 ENCOUNTER — OFFICE VISIT (OUTPATIENT)
Dept: OCCUPATIONAL THERAPY | Facility: CLINIC | Age: 84
End: 2024-05-30
Payer: COMMERCIAL

## 2024-05-30 DIAGNOSIS — M25.432 PAIN AND SWELLING OF LEFT WRIST: ICD-10-CM

## 2024-05-30 DIAGNOSIS — M25.532 PAIN AND SWELLING OF LEFT WRIST: ICD-10-CM

## 2024-05-30 PROCEDURE — L3906 WHO W/O JOINTS CF: HCPCS

## 2024-05-30 NOTE — PROGRESS NOTES
Orthosis    Diagnosis:   1. Pain and swelling of left wrist  Ambulatory Referral to PT/OT Hand Therapy        Indication:  wrist pain, hx of gout and wrist fracture    Location: Left  wrist  Supplies: Custom Fit Orthotic  Orthosis type: Wrist splint  Wearing Schedule: As Needed, during sleep hours  Describe Position: Wrist immobilized in slight extension, thumb and digits free    Precautions: Universal (skin contact/breakdown)    Patient or Caregiver expresses understanding of wearing Schedule and Precautions? Yes  Patient or Caregiver able to don/doff orthotic independently?Yes    Written orders provided to patient? Yes  Orders Obtained: Written  Orders Obtained from: Dr. Mcdonald    Return for evaluation and treatment No

## 2024-06-03 DIAGNOSIS — I10 ESSENTIAL HYPERTENSION: ICD-10-CM

## 2024-06-03 RX ORDER — LISINOPRIL 20 MG/1
20 TABLET ORAL DAILY
Qty: 90 TABLET | Refills: 1 | Status: SHIPPED | OUTPATIENT
Start: 2024-06-03

## 2024-06-24 DIAGNOSIS — M1A.0390 CHRONIC GOUT OF WRIST, UNSPECIFIED CAUSE, UNSPECIFIED LATERALITY: ICD-10-CM

## 2024-06-24 RX ORDER — ALLOPURINOL 100 MG/1
200 TABLET ORAL DAILY
Qty: 90 TABLET | Refills: 3 | Status: SHIPPED | OUTPATIENT
Start: 2024-06-24

## 2024-06-28 DIAGNOSIS — R80.9 TYPE 2 DIABETES MELLITUS WITH MICROALBUMINURIA, WITHOUT LONG-TERM CURRENT USE OF INSULIN (HCC): ICD-10-CM

## 2024-06-28 DIAGNOSIS — E11.29 TYPE 2 DIABETES MELLITUS WITH MICROALBUMINURIA, WITHOUT LONG-TERM CURRENT USE OF INSULIN (HCC): ICD-10-CM

## 2024-06-28 RX ORDER — NATEGLINIDE 60 MG/1
60 TABLET ORAL 2 TIMES DAILY WITH MEALS
Qty: 180 TABLET | Refills: 1 | Status: SHIPPED | OUTPATIENT
Start: 2024-06-28

## 2024-07-13 ENCOUNTER — APPOINTMENT (OUTPATIENT)
Dept: LAB | Facility: CLINIC | Age: 84
End: 2024-07-13
Payer: COMMERCIAL

## 2024-07-13 DIAGNOSIS — R80.9 TYPE 2 DIABETES MELLITUS WITH MICROALBUMINURIA, WITHOUT LONG-TERM CURRENT USE OF INSULIN (HCC): ICD-10-CM

## 2024-07-13 DIAGNOSIS — E83.52 HYPERCALCEMIA: ICD-10-CM

## 2024-07-13 DIAGNOSIS — M1A.0390 CHRONIC GOUT OF WRIST, UNSPECIFIED CAUSE, UNSPECIFIED LATERALITY: ICD-10-CM

## 2024-07-13 DIAGNOSIS — E61.1 IRON DEFICIENCY: ICD-10-CM

## 2024-07-13 DIAGNOSIS — I10 PRIMARY HYPERTENSION: ICD-10-CM

## 2024-07-13 DIAGNOSIS — E11.29 TYPE 2 DIABETES MELLITUS WITH MICROALBUMINURIA, WITHOUT LONG-TERM CURRENT USE OF INSULIN (HCC): ICD-10-CM

## 2024-07-13 DIAGNOSIS — E78.2 HYPERLIPIDEMIA, MIXED: ICD-10-CM

## 2024-07-13 LAB
25(OH)D3 SERPL-MCNC: 64.6 NG/ML (ref 30–100)
ALBUMIN SERPL BCG-MCNC: 3.6 G/DL (ref 3.5–5)
ALP SERPL-CCNC: 72 U/L (ref 34–104)
ALT SERPL W P-5'-P-CCNC: 13 U/L (ref 7–52)
ANION GAP SERPL CALCULATED.3IONS-SCNC: 13 MMOL/L (ref 4–13)
AST SERPL W P-5'-P-CCNC: 17 U/L (ref 13–39)
BASOPHILS # BLD AUTO: 0.06 THOUSANDS/ÂΜL (ref 0–0.1)
BASOPHILS NFR BLD AUTO: 1 % (ref 0–1)
BILIRUB SERPL-MCNC: 0.23 MG/DL (ref 0.2–1)
BUN SERPL-MCNC: 25 MG/DL (ref 5–25)
CALCIUM SERPL-MCNC: 10.2 MG/DL (ref 8.4–10.2)
CHLORIDE SERPL-SCNC: 102 MMOL/L (ref 96–108)
CHOLEST SERPL-MCNC: 121 MG/DL
CO2 SERPL-SCNC: 25 MMOL/L (ref 21–32)
CREAT SERPL-MCNC: 0.99 MG/DL (ref 0.6–1.3)
CREAT UR-MCNC: 281.5 MG/DL
EOSINOPHIL # BLD AUTO: 0.37 THOUSAND/ÂΜL (ref 0–0.61)
EOSINOPHIL NFR BLD AUTO: 5 % (ref 0–6)
ERYTHROCYTE [DISTWIDTH] IN BLOOD BY AUTOMATED COUNT: 16.8 % (ref 11.6–15.1)
EST. AVERAGE GLUCOSE BLD GHB EST-MCNC: 189 MG/DL
FERRITIN SERPL-MCNC: 48 NG/ML (ref 11–307)
GFR SERPL CREATININE-BSD FRML MDRD: 53 ML/MIN/1.73SQ M
GLUCOSE P FAST SERPL-MCNC: 95 MG/DL (ref 65–99)
HBA1C MFR BLD: 8.2 %
HCT VFR BLD AUTO: 36 % (ref 34.8–46.1)
HDLC SERPL-MCNC: 45 MG/DL
HGB BLD-MCNC: 10.5 G/DL (ref 11.5–15.4)
IMM GRANULOCYTES # BLD AUTO: 0.02 THOUSAND/UL (ref 0–0.2)
IMM GRANULOCYTES NFR BLD AUTO: 0 % (ref 0–2)
IRON SATN MFR SERPL: 12 % (ref 15–50)
IRON SERPL-MCNC: 33 UG/DL (ref 50–212)
LDLC SERPL CALC-MCNC: 55 MG/DL (ref 0–100)
LYMPHOCYTES # BLD AUTO: 2.64 THOUSANDS/ÂΜL (ref 0.6–4.47)
LYMPHOCYTES NFR BLD AUTO: 33 % (ref 14–44)
MCH RBC QN AUTO: 26.4 PG (ref 26.8–34.3)
MCHC RBC AUTO-ENTMCNC: 29.2 G/DL (ref 31.4–37.4)
MCV RBC AUTO: 91 FL (ref 82–98)
MICROALBUMIN UR-MCNC: 127.7 MG/L
MICROALBUMIN/CREAT 24H UR: 45 MG/G CREATININE (ref 0–30)
MONOCYTES # BLD AUTO: 0.49 THOUSAND/ÂΜL (ref 0.17–1.22)
MONOCYTES NFR BLD AUTO: 6 % (ref 4–12)
NEUTROPHILS # BLD AUTO: 4.51 THOUSANDS/ÂΜL (ref 1.85–7.62)
NEUTS SEG NFR BLD AUTO: 55 % (ref 43–75)
NRBC BLD AUTO-RTO: 0 /100 WBCS
PHOSPHATE SERPL-MCNC: 3 MG/DL (ref 2.3–4.1)
PLATELET # BLD AUTO: 470 THOUSANDS/UL (ref 149–390)
PMV BLD AUTO: 9.3 FL (ref 8.9–12.7)
POTASSIUM SERPL-SCNC: 4.1 MMOL/L (ref 3.5–5.3)
PROT SERPL-MCNC: 7.5 G/DL (ref 6.4–8.4)
PTH-INTACT SERPL-MCNC: 25 PG/ML (ref 12–88)
RBC # BLD AUTO: 3.97 MILLION/UL (ref 3.81–5.12)
SODIUM SERPL-SCNC: 140 MMOL/L (ref 135–147)
TIBC SERPL-MCNC: 271 UG/DL (ref 250–450)
TRIGL SERPL-MCNC: 106 MG/DL
UIBC SERPL-MCNC: 238 UG/DL (ref 155–355)
URATE SERPL-MCNC: 3.1 MG/DL (ref 2–7.5)
WBC # BLD AUTO: 8.09 THOUSAND/UL (ref 4.31–10.16)

## 2024-07-13 PROCEDURE — 82728 ASSAY OF FERRITIN: CPT

## 2024-07-13 PROCEDURE — 83550 IRON BINDING TEST: CPT

## 2024-07-13 PROCEDURE — 84100 ASSAY OF PHOSPHORUS: CPT

## 2024-07-13 PROCEDURE — 83036 HEMOGLOBIN GLYCOSYLATED A1C: CPT

## 2024-07-13 PROCEDURE — 80053 COMPREHEN METABOLIC PANEL: CPT

## 2024-07-13 PROCEDURE — 36415 COLL VENOUS BLD VENIPUNCTURE: CPT

## 2024-07-13 PROCEDURE — 82570 ASSAY OF URINE CREATININE: CPT

## 2024-07-13 PROCEDURE — 82043 UR ALBUMIN QUANTITATIVE: CPT

## 2024-07-13 PROCEDURE — 80061 LIPID PANEL: CPT

## 2024-07-13 PROCEDURE — 85025 COMPLETE CBC W/AUTO DIFF WBC: CPT

## 2024-07-13 PROCEDURE — 84550 ASSAY OF BLOOD/URIC ACID: CPT

## 2024-07-13 PROCEDURE — 82306 VITAMIN D 25 HYDROXY: CPT

## 2024-07-13 PROCEDURE — 83970 ASSAY OF PARATHORMONE: CPT

## 2024-07-13 PROCEDURE — 83540 ASSAY OF IRON: CPT

## 2024-07-17 PROBLEM — E78.5 HYPERLIPIDEMIA ASSOCIATED WITH TYPE 2 DIABETES MELLITUS  (HCC): Status: ACTIVE | Noted: 2020-06-08

## 2024-07-17 PROBLEM — E11.59 HYPERTENSION ASSOCIATED WITH DIABETES  (HCC): Status: ACTIVE | Noted: 2020-06-04

## 2024-07-17 PROBLEM — I15.2 HYPERTENSION ASSOCIATED WITH DIABETES  (HCC): Status: ACTIVE | Noted: 2020-06-04

## 2024-07-17 PROBLEM — E11.69 HYPERLIPIDEMIA ASSOCIATED WITH TYPE 2 DIABETES MELLITUS  (HCC): Status: ACTIVE | Noted: 2020-06-08

## 2024-07-17 NOTE — PROGRESS NOTES
"Ambulatory Visit  Name: Tanisha Morrow      : 1940      MRN: [id number]  Encounter Provider: Raquel Rocha DO  Encounter Date: 2024   Encounter department: [facility]    Assessment & Plan   1. Type 2 diabetes mellitus with microalbuminuria, without long-term current use of insulin (HCC)  Assessment & Plan:  Above goal, likely due to diet non-compliance. Encouraged to cut out sugary beverages and cut back on carb intake. Will repeat A1c in 3 months to monitor. Will also recheck kidney function at that time, given it was a bit decreased from previous.   Orders:  -     Hemoglobin A1C; Future; Expected date: 10/01/2024  -     Comprehensive metabolic panel; Future; Expected date: 10/01/2024  2. Hypertension associated with diabetes  (HCC)  Assessment & Plan:  Borderline in office on recheck -- reviewed diet modifications and will continue to monitor   3. Hyperlipidemia associated with type 2 diabetes mellitus  (HCC)  Assessment & Plan:  Within goal without medication  4. Chronic gout of wrist, unspecified cause, unspecified laterality  Assessment & Plan:  Wrist symptoms much improved, otherwise no acute flares   5. Hypercalcemia  Assessment & Plan:  Improved, will continue to monitor     6. Iron deficiency  Assessment & Plan:  A bit decreased on most recent labs, pt denies bleeding. Will update labs in 3 months to monitor.   Orders:  -     CBC and differential; Future; Expected date: 10/01/2024  -     Iron Panel (Includes Ferritin, Iron Sat%, Iron, and TIBC); Future; Expected date: 10/01/2024         History of Present Illness     HPI    Pt presents with her niece for chronic follow up, lab review.     DM: A1c 8.2% (from 7.5); (+) microalbuminuria; home sugars none, \"I don't really pay attention to what I eat\" -- drinks soda and sugary tea, eats a lot of crackers   HTN: Home BPs none   HLD: Lipids: Total 121, LDL 55, HDL 45, ; LFTs WNL  Gout: Uric acid 3.1; saw ortho for her " "wrist, had a injection and given brace which helps   Hypercalcemia: PTH 25, Ca 10.2 (improved), Phos 3, D 64  CATY: Hb 10.5/Platelets 470; Iron 33, Ferritin 48     Cr 0.99/GFR 53 (decreased)       Review of Systems   Eyes:  Negative for visual disturbance.   Respiratory:  Negative for cough and shortness of breath.    Cardiovascular:  Positive for chest pain (had an episode that lasted for an entire day -- central, like someone hit her; pt defers Cardio referral). Negative for palpitations and leg swelling.   Gastrointestinal:  Negative for abdominal pain, blood in stool, constipation and diarrhea.   Endocrine: Negative for polyuria.   Genitourinary:  Negative for dysuria, hematuria and vaginal bleeding.   Musculoskeletal:  Positive for arthralgias.   Neurological:  Negative for dizziness and headaches.     Medical History Reviewed by provider this encounter:  Tobacco  Allergies  Meds  Problems  Med Hx  Surg Hx  Fam Hx       Objective     /78   Pulse 95   Temp 98 °F (36.7 °C)   Ht 5' 3\" (1.6 m)   Wt 62.1 kg (137 lb)   SpO2 99%   BMI 24.27 kg/m²     Physical Exam  Vitals and nursing note reviewed.   Constitutional:       General: She is not in acute distress.     Appearance: She is well-developed.   HENT:      Head: Normocephalic and atraumatic.      Right Ear: Tympanic membrane, ear canal and external ear normal.      Left Ear: Tympanic membrane, ear canal and external ear normal.      Nose: Nose normal. No rhinorrhea.      Mouth/Throat:      Mouth: Mucous membranes are moist.      Pharynx: No oropharyngeal exudate or posterior oropharyngeal erythema.   Eyes:      Conjunctiva/sclera: Conjunctivae normal.   Neck:      Thyroid: No thyromegaly.   Cardiovascular:      Rate and Rhythm: Normal rate and regular rhythm.      Heart sounds: Murmur (slight) heard.   Pulmonary:      Effort: Pulmonary effort is normal. No respiratory distress.      Breath sounds: Normal breath sounds.   Abdominal:      General: " Bowel sounds are normal. There is no distension.      Palpations: Abdomen is soft.      Tenderness: There is no abdominal tenderness.   Musculoskeletal:      Right lower leg: No edema.      Left lower leg: No edema.   Lymphadenopathy:      Cervical: No cervical adenopathy.   Skin:     General: Skin is warm and dry.   Neurological:      Mental Status: She is alert.      Comments: Grossly intact   Psychiatric:         Mood and Affect: Mood normal.       Administrative Statements

## 2024-07-18 ENCOUNTER — OFFICE VISIT (OUTPATIENT)
Dept: FAMILY MEDICINE CLINIC | Facility: CLINIC | Age: 84
End: 2024-07-18
Payer: COMMERCIAL

## 2024-07-18 VITALS
WEIGHT: 137 LBS | TEMPERATURE: 98 F | HEART RATE: 95 BPM | SYSTOLIC BLOOD PRESSURE: 142 MMHG | OXYGEN SATURATION: 99 % | BODY MASS INDEX: 24.27 KG/M2 | HEIGHT: 63 IN | DIASTOLIC BLOOD PRESSURE: 78 MMHG

## 2024-07-18 DIAGNOSIS — E11.69 HYPERLIPIDEMIA ASSOCIATED WITH TYPE 2 DIABETES MELLITUS  (HCC): ICD-10-CM

## 2024-07-18 DIAGNOSIS — E61.1 IRON DEFICIENCY: ICD-10-CM

## 2024-07-18 DIAGNOSIS — E83.52 HYPERCALCEMIA: ICD-10-CM

## 2024-07-18 DIAGNOSIS — I15.2 HYPERTENSION ASSOCIATED WITH DIABETES  (HCC): ICD-10-CM

## 2024-07-18 DIAGNOSIS — E11.59 HYPERTENSION ASSOCIATED WITH DIABETES  (HCC): ICD-10-CM

## 2024-07-18 DIAGNOSIS — R80.9 TYPE 2 DIABETES MELLITUS WITH MICROALBUMINURIA, WITHOUT LONG-TERM CURRENT USE OF INSULIN (HCC): Primary | ICD-10-CM

## 2024-07-18 DIAGNOSIS — E11.29 TYPE 2 DIABETES MELLITUS WITH MICROALBUMINURIA, WITHOUT LONG-TERM CURRENT USE OF INSULIN (HCC): Primary | ICD-10-CM

## 2024-07-18 DIAGNOSIS — M1A.0390 CHRONIC GOUT OF WRIST, UNSPECIFIED CAUSE, UNSPECIFIED LATERALITY: ICD-10-CM

## 2024-07-18 DIAGNOSIS — E78.5 HYPERLIPIDEMIA ASSOCIATED WITH TYPE 2 DIABETES MELLITUS  (HCC): ICD-10-CM

## 2024-07-18 PROCEDURE — G2211 COMPLEX E/M VISIT ADD ON: HCPCS | Performed by: FAMILY MEDICINE

## 2024-07-18 PROCEDURE — 99214 OFFICE O/P EST MOD 30 MIN: CPT | Performed by: FAMILY MEDICINE

## 2024-07-18 NOTE — ASSESSMENT & PLAN NOTE
Above goal, likely due to diet non-compliance. Encouraged to cut out sugary beverages and cut back on carb intake. Will repeat A1c in 3 months to monitor. Will also recheck kidney function at that time, given it was a bit decreased from previous.

## 2024-07-18 NOTE — ASSESSMENT & PLAN NOTE
A bit decreased on most recent labs, pt denies bleeding. Will update labs in 3 months to monitor.

## 2024-07-24 ENCOUNTER — OFFICE VISIT (OUTPATIENT)
Dept: URGENT CARE | Facility: CLINIC | Age: 84
End: 2024-07-24
Payer: COMMERCIAL

## 2024-07-24 ENCOUNTER — APPOINTMENT (OUTPATIENT)
Dept: RADIOLOGY | Facility: CLINIC | Age: 84
End: 2024-07-24
Payer: COMMERCIAL

## 2024-07-24 VITALS
OXYGEN SATURATION: 98 % | RESPIRATION RATE: 18 BRPM | SYSTOLIC BLOOD PRESSURE: 157 MMHG | TEMPERATURE: 98 F | HEART RATE: 91 BPM | DIASTOLIC BLOOD PRESSURE: 72 MMHG

## 2024-07-24 DIAGNOSIS — M75.02 ADHESIVE CAPSULITIS OF LEFT SHOULDER: Primary | ICD-10-CM

## 2024-07-24 DIAGNOSIS — M75.02 ADHESIVE CAPSULITIS OF LEFT SHOULDER: ICD-10-CM

## 2024-07-24 PROCEDURE — 73030 X-RAY EXAM OF SHOULDER: CPT

## 2024-07-24 PROCEDURE — 99214 OFFICE O/P EST MOD 30 MIN: CPT | Performed by: STUDENT IN AN ORGANIZED HEALTH CARE EDUCATION/TRAINING PROGRAM

## 2024-07-24 RX ORDER — MELOXICAM 7.5 MG/1
7.5 TABLET ORAL DAILY
Qty: 10 TABLET | Refills: 0 | Status: SHIPPED | OUTPATIENT
Start: 2024-07-24 | End: 2024-08-03

## 2024-07-24 NOTE — PROGRESS NOTES
[facility]        NAME: Tanisha Morrow is a 83 y.o. female  : 1940    MRN: [id number]  DATE: 2024  TIME: 6:26 PM    Assessment and Orders   Adhesive capsulitis of left shoulder [M75.02]  1. Adhesive capsulitis of left shoulder  XR shoulder 2+ vw left    meloxicam (Mobic) 7.5 mg tablet    Ambulatory Referral to Orthopedic Surgery            Plan and Discussion      Very limited ROM in the left arm. Xray shows arthritis. Patient with history of T2DM, so there may be a component of adhesive capsulitis. Will treat with Mobic and referred to ortho. Patient also given sling for comfort, although I encouraged her to take arm out of sling multiples times a day to prevent worsening stiffening.       Risks and benefits discussed. Patient understands and agrees with the plan.     PATIENT INSTRUCTIONS      If tests have been performed at [facility], our office will contact you with results if changes need to be made to the care plan discussed with you at the visit.  [facility]    Follow up with PCP.     If any of the following occur, please report to your nearest ED for evaluation or call 911.   Difficultly breathing or shortness of breath  Chest pain  Acutely worsening symptoms.         Chief Complaint     Chief Complaint   Patient presents with    Shoulder Pain     Chronic in nature, aggravated by a fall 6 months ago.  Bengay didn't like the smell. Unable to lift arm. Per daughter arm has had limited motion to arm          History of Present Illness       Shoulder Pain   The pain is present in the left shoulder, left arm, left elbow and left wrist. This is a new problem. The current episode started more than 1 year ago. There has been a history of trauma (fell last month, no breaks). The problem occurs constantly. The problem has been gradually worsening. The quality of the pain is described as aching. Associated symptoms include joint swelling, a limited  range of motion and stiffness. Pertinent negatives include no fever, inability to bear weight, numbness or tingling. Her past medical history is significant for diabetes.       Review of Systems   Review of Systems   Constitutional:  Negative for fever.   Musculoskeletal:  Positive for stiffness.   Neurological:  Negative for tingling and numbness.         Current Medications       Current Outpatient Medications:     allopurinol (ZYLOPRIM) 100 mg tablet, TAKE 2 TABLETS BY MOUTH DAILY, Disp: 90 tablet, Rfl: 3    aspirin 81 mg chewable tablet, Chew 81 mg daily, Disp: , Rfl:     B Complex Vitamins (VITAMIN B COMPLEX PO), Take by mouth, Disp: , Rfl:     colchicine (COLCRYS) 0.6 mg tablet, TAKE 2 TABLETS BY MOUTH, THEN 1 HOUR LATER TAKE 1 TABLET BY MOUTH AS NEEDED FOR ACUTE GOUT FLARE, Disp: 15 tablet, Rfl: 5    ferrous sulfate 325 (65 FE) MG EC tablet, TAKE 1 TABLET (324 MG TOTAL) BY MOUTH DAILY BEFORE BREAKFAST, Disp: 90 tablet, Rfl: 1    fluticasone (FLONASE) 50 mcg/act nasal spray, 1 spray into each nostril daily, Disp: 16 g, Rfl: 1    glucose blood test strip, MEDICARE B, Disp: , Rfl:     lisinopril (ZESTRIL) 20 mg tablet, TAKE 1 TABLET (20 MG TOTAL) BY MOUTH DAILY, Disp: 90 tablet, Rfl: 1    meloxicam (Mobic) 7.5 mg tablet, Take 1 tablet (7.5 mg total) by mouth daily for 10 days, Disp: 10 tablet, Rfl: 0    metFORMIN (GLUCOPHAGE) 1000 MG tablet, TAKE 1 TABLET (1,000 MG TOTAL) BY MOUTH 2 (TWO) TIMES A DAY WITH MEALS, Disp: 180 tablet, Rfl: 1    Misc Natural Products (Osteo Bi-Flex/5-Loxin Advanced) TABS, Take by mouth, Disp: , Rfl:     nateglinide (STARLIX) 60 mg tablet, TAKE 1 TABLET (60 MG TOTAL) BY MOUTH 2 (TWO) TIMES A DAY WITH MEALS, Disp: 180 tablet, Rfl: 1    OneTouch Verio test strip, USE TO TEST BLOOD SUGAR DAILY AS DIRECTED, Disp: 100 each, Rfl: 3    Alcohol Swabs (Pharmacist Choice Alcohol) PADS, Use as directed, Disp: , Rfl:     Lancet Devices (Adjustable Lancing Device) MISC, MEDICARE B, Disp: 1 each,  Rfl: 1    LIFESCAN FINEPOINT LANCETS MISC, by Does not apply route daily, Disp: 100 each, Rfl: 3    naproxen (Naprosyn) 500 mg tablet, Take 1 tablet (500 mg total) by mouth 2 (two) times a day with meals for 5 days, Disp: 10 tablet, Rfl: 0    Current Allergies     Allergies as of 2024    (No Known Allergies)            The following portions of the patient's history were reviewed and updated as appropriate: allergies, current medications, past family history, past medical history, past social history, past surgical history and problem list.     Past Medical History:   Diagnosis Date    Diabetes mellitus (HCC)     Hypertension     Osteoporosis     pt denies on PT eval 20       Past Surgical History:   Procedure Laterality Date     SECTION         Family History   Problem Relation Age of Onset    Diabetes Mother     COPD Mother     Substance Abuse Mother          Medications have been verified.        Objective   /72   Pulse 91   Temp 98 °F (36.7 °C)   Resp 18   SpO2 98%   No LMP recorded. Patient is postmenopausal.       Physical Exam     Physical Exam  Constitutional:       General: She is not in acute distress.     Appearance: She is not ill-appearing.   Cardiovascular:      Rate and Rhythm: Normal rate.   Pulmonary:      Effort: No respiratory distress.   Musculoskeletal:         General: Tenderness present.      Left shoulder: Tenderness (AC joint) and bony tenderness present. No swelling, deformity, effusion or laceration. Decreased range of motion (cannot lift arm more than 10 degrees.). Decreased strength.      Comments: Positive stephenson test   Neurological:      General: No focal deficit present.      Mental Status: She is alert and oriented to person, place, and time.   Psychiatric:         Mood and Affect: Mood normal.         Behavior: Behavior normal.               Roseanna Palma DO

## 2024-07-31 DIAGNOSIS — E11.29 TYPE 2 DIABETES MELLITUS WITH MICROALBUMINURIA, WITHOUT LONG-TERM CURRENT USE OF INSULIN (HCC): ICD-10-CM

## 2024-07-31 DIAGNOSIS — R80.9 TYPE 2 DIABETES MELLITUS WITH MICROALBUMINURIA, WITHOUT LONG-TERM CURRENT USE OF INSULIN (HCC): ICD-10-CM

## 2024-08-14 ENCOUNTER — OFFICE VISIT (OUTPATIENT)
Dept: OBGYN CLINIC | Facility: CLINIC | Age: 84
End: 2024-08-14

## 2024-08-14 VITALS
SYSTOLIC BLOOD PRESSURE: 134 MMHG | HEART RATE: 98 BPM | DIASTOLIC BLOOD PRESSURE: 80 MMHG | WEIGHT: 137 LBS | BODY MASS INDEX: 24.27 KG/M2 | HEIGHT: 63 IN

## 2024-08-14 DIAGNOSIS — M62.838 TRAPEZIUS MUSCLE SPASM: ICD-10-CM

## 2024-08-14 DIAGNOSIS — M19.012 ARTHRITIS OF LEFT GLENOHUMERAL JOINT: ICD-10-CM

## 2024-08-14 DIAGNOSIS — M75.42 SUBACROMIAL IMPINGEMENT OF LEFT SHOULDER: ICD-10-CM

## 2024-08-14 DIAGNOSIS — M19.012 ARTHRITIS OF LEFT ACROMIOCLAVICULAR JOINT: ICD-10-CM

## 2024-08-14 DIAGNOSIS — S46.012A TRAUMATIC COMPLETE TEAR OF LEFT ROTATOR CUFF, INITIAL ENCOUNTER: ICD-10-CM

## 2024-08-14 DIAGNOSIS — M75.02 ADHESIVE CAPSULITIS OF LEFT SHOULDER: Primary | ICD-10-CM

## 2024-08-14 RX ORDER — BUPIVACAINE HYDROCHLORIDE 2.5 MG/ML
1 INJECTION, SOLUTION INFILTRATION; PERINEURAL
Status: COMPLETED | OUTPATIENT
Start: 2024-08-14 | End: 2024-08-14

## 2024-08-14 RX ORDER — TRIAMCINOLONE ACETONIDE 40 MG/ML
40 INJECTION, SUSPENSION INTRA-ARTICULAR; INTRAMUSCULAR
Status: COMPLETED | OUTPATIENT
Start: 2024-08-14 | End: 2024-08-14

## 2024-08-14 RX ORDER — BUPIVACAINE HYDROCHLORIDE 2.5 MG/ML
4 INJECTION, SOLUTION INFILTRATION; PERINEURAL
Status: COMPLETED | OUTPATIENT
Start: 2024-08-14 | End: 2024-08-14

## 2024-08-14 RX ORDER — ACETAMINOPHEN 160 MG
TABLET,DISINTEGRATING ORAL
COMMUNITY
Start: 2024-08-02

## 2024-08-14 RX ADMIN — TRIAMCINOLONE ACETONIDE 40 MG: 40 INJECTION, SUSPENSION INTRA-ARTICULAR; INTRAMUSCULAR at 08:30

## 2024-08-14 RX ADMIN — BUPIVACAINE HYDROCHLORIDE 4 ML: 2.5 INJECTION, SOLUTION INFILTRATION; PERINEURAL at 08:30

## 2024-08-14 RX ADMIN — BUPIVACAINE HYDROCHLORIDE 1 ML: 2.5 INJECTION, SOLUTION INFILTRATION; PERINEURAL at 08:30

## 2024-08-14 NOTE — PROGRESS NOTES
Assessment/Plan:  Assessment & Plan   Diagnoses and all orders for this visit:    Adhesive capsulitis of left shoulder  -     Ambulatory Referral to Orthopedic Surgery  -     Ambulatory Referral to Physical Therapy; Future    Arthritis of left glenohumeral joint  -     Ambulatory Referral to Physical Therapy; Future    Arthritis of left acromioclavicular joint  -     Ambulatory Referral to Physical Therapy; Future    Traumatic complete tear of left rotator cuff, initial encounter  -     Ambulatory Referral to Physical Therapy; Future    Trapezius muscle spasm  -     Ambulatory Referral to Physical Therapy; Future    Subacromial impingement of left shoulder  -     Large joint arthrocentesis: R subacromial bursa  -     Ambulatory Referral to Physical Therapy; Future    Other orders  -     Cholecalciferol (Vitamin D3) 50 MCG (2000 UT) capsule; TAKE ONE (1) CAPSULE BY MOUTH DAILY      83-year-old right-hand-dominant female with left shoulder pain more than 6 months duration following injury.  Discussed with patient physical exam, radiographs, impression, and plan.  X-rays left shoulder unremarkable for acute osseous abnormality but noted for glenohumeral and AC joint degenerative changes.  Mechanism of injury, imaging studies, clinical exam, and history suggest symptoms from combination of rotator cuff tear and aggravation of degenerative changes.  I advised is uncertain as to whether wrist cuff tear occurred at time of injury or if it was previous underlying and was exacerbated by the fall.  I advised surgery not warranted at this time.  I discussed treatment regimen of steroid injections and formal therapy.  I advised steroid injection may cause elevation in blood sugar that may last 1 week.  I administered mixtures 3 cc 0.25% bupivacaine and 1 cc Kenalog to the left shoulder glenohumeral joint and subacromial space without complication.  She is to start physical therapy as soon as possible and do home exercises as  "directed.  Recommend discontinue arm sling.  She will follow-up as needed.          Subjective:   Patient ID: Tanisha Morrow is a 83 y.o. female.  Chief Complaint   Patient presents with    Left Shoulder - Pain        83-year-old right-hand-dominant female presents for evaluation of left shoulder pain more than 6 months duration.  She reports having falling forward landing onto her outstretched hands.  She had pain described as sudden in onset, generalized to the left shoulder, rating distally to the upper arm, worse at moving the arm, associated limited range of motion and weakness, and improved with resting.  Symptoms persisted she presented to urgent care.  X-ray evaluation was noted for degenerative changes.  She was provided with arm sling, prescribed anti-inflammatory, and advised to follow-up with orthopedic care.    Shoulder Pain  This is a new problem. The current episode started more than 1 month ago. The problem occurs daily. The problem has been gradually worsening. Associated symptoms include arthralgias and weakness. Pertinent negatives include no joint swelling or numbness. Exacerbated by: Arm movement. She has tried rest, position changes, NSAIDs and immobilization for the symptoms. The treatment provided mild relief.           The following portions of the patient's history were reviewed and updated as appropriate: She  has a past medical history of Diabetes mellitus (HCC), Hypertension, and Osteoporosis.  She has No Known Allergies..    Review of Systems   Musculoskeletal:  Positive for arthralgias. Negative for joint swelling.   Neurological:  Positive for weakness. Negative for numbness.       Objective:  Vitals:    08/14/24 0850   BP: 134/80   Pulse: 98   Weight: 62.1 kg (137 lb)   Height: 5' 3\" (1.6 m)      Right Hand Exam     Muscle Strength   : 5/5     Other   Sensation: normal  Pulse: present      Left Hand Exam     Muscle Strength   :  5/5     Other   Sensation: normal  Pulse: " present      Right Elbow Exam     Other   Sensation: normal      Left Elbow Exam     Range of Motion   The patient has normal left elbow ROM.    Other   Sensation: normal      Right Shoulder Exam     Other   Sensation: normal      Left Shoulder Exam     Range of Motion   Active abduction:  60   Forward flexion:  70   Left shoulder internal rotation 0 degrees: Lateral hip.     Muscle Strength   Abduction: 5/5   Internal rotation: 5/5   External rotation: 4/5   Supraspinatus: 4/5     Tests   Martino test: positive    Other   Sensation: normal     Comments:  Positive empty can            Physical Exam  Vitals and nursing note reviewed.   Constitutional:       Appearance: Normal appearance. She is well-developed. She is not ill-appearing or diaphoretic.   HENT:      Head: Normocephalic and atraumatic.      Right Ear: External ear normal.      Left Ear: External ear normal.   Eyes:      Conjunctiva/sclera: Conjunctivae normal.   Neck:      Trachea: No tracheal deviation.   Cardiovascular:      Rate and Rhythm: Normal rate.   Pulmonary:      Effort: Pulmonary effort is normal. No respiratory distress.   Abdominal:      General: There is no distension.   Musculoskeletal:         General: Tenderness present.   Skin:     General: Skin is warm and dry.      Coloration: Skin is not jaundiced or pale.   Neurological:      Mental Status: She is alert and oriented to person, place, and time.   Psychiatric:         Mood and Affect: Mood normal.         Behavior: Behavior normal.         Thought Content: Thought content normal.         Judgment: Judgment normal.         I have personally reviewed pertinent films in PACS and my interpretation is  .  Degenerative changes of the before meals and glenohumeral joints.  No acute osseous abnormality of the left shoulder.      Large joint arthrocentesis: R subacromial bursa  [place] Protocol:  procedure performed by consultantConsent: Verbal consent obtained.  Risks and benefits: risks,  "benefits and alternatives were discussed  Consent given by: patient  Time out: Immediately prior to procedure a \"time out\" was called to verify the correct patient, procedure, equipment, support staff and site/side marked as required.  Patient understanding: patient states understanding of the procedure being performed  Patient consent: the patient's understanding of the procedure matches consent given  Procedure consent: procedure consent matches procedure scheduled  Relevant documents: relevant documents present and verified  Test results: test results available and properly labeled  Site marked: the operative site was marked  Radiology Images displayed and confirmed. If images not available, report reviewed: imaging studies available  Required items: required blood products, implants, devices, and special equipment available  Patient identity confirmed: verbally with patient  Supporting Documentation  Indications: pain   Procedure Details  Location: shoulder - R subacromial bursa  Preparation: Patient was prepped and draped in the usual sterile fashion  Needle size: 22 G  Ultrasound guidance: no  Approach: posterolateral  Medications administered: 4 mL bupivacaine 0.25 %; 1 mL bupivacaine 0.25 %; 40 mg triamcinolone acetonide 40 mg/mL    Patient tolerance: patient tolerated the procedure well with no immediate complications  Dressing:  Sterile dressing applied              "

## 2024-08-15 ENCOUNTER — VBI (OUTPATIENT)
Dept: ADMINISTRATIVE | Facility: OTHER | Age: 84
End: 2024-08-15

## 2024-08-15 NOTE — TELEPHONE ENCOUNTER
08/15/24 3:12 PM     Chart reviewed for Microalbumin Creatinine Urine Ratio OR Albumin Creatinine Urine Ratio  was/were submitted to the patient's insurance.     Pamella Paez   [facility]

## 2024-08-26 ENCOUNTER — EVALUATION (OUTPATIENT)
Dept: PHYSICAL THERAPY | Facility: CLINIC | Age: 84
End: 2024-08-26
Payer: COMMERCIAL

## 2024-08-26 DIAGNOSIS — M75.02 ADHESIVE CAPSULITIS OF LEFT SHOULDER: ICD-10-CM

## 2024-08-26 DIAGNOSIS — S46.012A TRAUMATIC COMPLETE TEAR OF LEFT ROTATOR CUFF, INITIAL ENCOUNTER: ICD-10-CM

## 2024-08-26 DIAGNOSIS — M62.838 TRAPEZIUS MUSCLE SPASM: ICD-10-CM

## 2024-08-26 DIAGNOSIS — M19.012 ARTHRITIS OF LEFT ACROMIOCLAVICULAR JOINT: ICD-10-CM

## 2024-08-26 DIAGNOSIS — M19.012 ARTHRITIS OF LEFT GLENOHUMERAL JOINT: ICD-10-CM

## 2024-08-26 DIAGNOSIS — M75.42 SUBACROMIAL IMPINGEMENT OF LEFT SHOULDER: ICD-10-CM

## 2024-08-26 PROCEDURE — 97110 THERAPEUTIC EXERCISES: CPT

## 2024-08-26 PROCEDURE — 97140 MANUAL THERAPY 1/> REGIONS: CPT

## 2024-08-26 PROCEDURE — 97161 PT EVAL LOW COMPLEX 20 MIN: CPT

## 2024-08-26 NOTE — PROGRESS NOTES
PT Evaluation     Today's date: 24  Patient name: Tanisha Morrow  : 1940  MRN: [id number]  Referring provider: Vic Cervantes*  Dx:   Encounter Diagnosis     ICD-10-CM    1. Adhesive capsulitis of left shoulder  M75.02 Ambulatory Referral to Physical Therapy      2. Arthritis of left glenohumeral joint  M19.012 Ambulatory Referral to Physical Therapy      3. Arthritis of left acromioclavicular joint  M19.012 Ambulatory Referral to Physical Therapy      4. Traumatic complete tear of left rotator cuff, initial encounter  S46.012A Ambulatory Referral to Physical Therapy      5. Trapezius muscle spasm  M62.838 Ambulatory Referral to Physical Therapy      6. Subacromial impingement of left shoulder  M75.42 Ambulatory Referral to Physical Therapy          Start Time: 1445  Stop Time: 1545  Total time in clinic (min): 60 minutes     Assessment    Assessment details: Problem List:  1) L shoulder weakness  2) L Shoulder hypomobility    Tanisha Morrow is a pleasant 83 y.o. female who presents with L shoulder pain.  she has shoulder weakness, hypomobility, and pain with function resulting in limited participation in her daily activities.  No further referral appears necessary at this time based upon examination results.  The patient was educated on proper technique for HEP and demonstrated understanding. She was advised to call us if any further problems arise.        Plan    Plan of Care beginning date: 2024  Plan of Care expiration date: 2024        Subjective Evaluation    History of Present Illness  Mechanism of injury: Tanisha presents today with complaints of L shoulder pain that began over 6 months ago.    Mechanism of injury was a fall about 6 months ago.    Pt had X-ray performed on  that shows the following: No acute osseous abnormality. Degenerative changes.    The patient also reports diminished strength, decreased ROM, decreased endurance, diminished sensation, and difficulty with  function.    Tanisha has a past medical history of Diabetes mellitus (HCC), Hypertension, and Osteoporosis.     Patient Goals  Patient goals for therapy: decreased pain, return to sport/leisure activities, independence with ADLs/IADLs, increased strength and increased motion    Pain  No pain reported        Objective     Active Range of Motion   Cervical/Thoracic Spine     Normal active range of motion  Left Shoulder   Flexion: 126 degrees     Right Shoulder   Flexion: 143 degrees     Strength/Myotome Testing     Left Shoulder     Planes of Motion   Flexion: 4   Abduction: 4   External rotation at 0°: 4+   Internal rotation at 0°: 4-     Isolated Muscles   Biceps: 4   Triceps: 4     Right Shoulder     Planes of Motion   Flexion: 4   Abduction: 4   External rotation at 0°: 4+   Internal rotation at 0°: 4-     Isolated Muscles   Biceps: 4   Triceps: 4     General Comments:      Cervical/Thoracic Comments  WNL      Access Code: [email or web address]  URL: [email or web address]  Date: 08/26/2024  Prepared by: Jw Araya    Exercises  - Circular Shoulder Pendulum with Table Support  - 3 x daily - 7 x weekly - 1 sets - 30 reps - 1 hold  - Seated Shoulder Flexion Towel Slide at Table Top  - 3 x daily - 7 x weekly - 1 sets - 5 reps - 10 hold  - Seated Shoulder External Rotation PROM on Table  - 3 x daily - 7 x weekly - 1 sets - 5 reps - 10 hold  - Seated Shoulder Abduction Towel Slide at Table Top  - 1 x daily - 7 x weekly - 3 sets - 10 reps  - Standing Shoulder Internal Rotation Stretch with Towel  - 1 x daily - 7 x weekly - 3 sets - 10 reps  - Shoulder Scaption AAROM with Dowel  - 1 x daily - 7 x weekly - 3 sets - 10 reps  - Standing Shoulder Abduction AAROM with Dowel  - 1 x daily - 7 x weekly - 3 sets - 10 reps  - Standing Shoulder Extension AAROM with Dowel  - 1 x daily - 7 x weekly - 3 sets - 10 reps  - Seated Scapular Retraction  - 1 x daily - 7 x weekly - 3 sets - 10 reps

## 2024-09-05 ENCOUNTER — OFFICE VISIT (OUTPATIENT)
Dept: URGENT CARE | Facility: CLINIC | Age: 84
End: 2024-09-05
Payer: COMMERCIAL

## 2024-09-05 VITALS
TEMPERATURE: 97.9 F | OXYGEN SATURATION: 96 % | HEART RATE: 83 BPM | RESPIRATION RATE: 20 BRPM | DIASTOLIC BLOOD PRESSURE: 78 MMHG | SYSTOLIC BLOOD PRESSURE: 177 MMHG

## 2024-09-05 DIAGNOSIS — M25.50 ARTHRALGIA, UNSPECIFIED JOINT: Primary | ICD-10-CM

## 2024-09-05 PROCEDURE — 99213 OFFICE O/P EST LOW 20 MIN: CPT

## 2024-09-05 NOTE — PROGRESS NOTES
"  [facility]        NAME: Tanisha Morrow is a 83 y.o. female  : 1940    MRN: [id number]  DATE: 2024  TIME: 8:17 PM    Assessment and Plan   Arthralgia, unspecified joint [M25.50]  1. Arthralgia, unspecified joint  Transfer to other facility            Patient Instructions     Proceed to the ER for further evaluation.     Chief Complaint     Chief Complaint   Patient presents with    Hip Pain    Knee Pain     C/o \"constant Sarah horse\" type pain to hip and knees. Rates 10/10 no interventions attempted. Getting worse over last 3 days. Today unable to walk         History of Present Illness       The patient presents today with her daughter for complaints of L lower back pain and R calf pain/\"sarah horse\" x 3 days. Denies any fall or injury. States the pain is constant and has been barely able to walk. Is normally able to ambulate without any issue. Denies fever/chills. Reports history of arthritis, but denies history of sciatica or DVT. Denies fever/chills, recent medication changes.         Review of Systems   Review of Systems   Constitutional:  Negative for chills and fever.   Musculoskeletal:  Positive for arthralgias (L lower back and R calf). Negative for myalgias.   Skin:  Negative for rash.         Current Medications       Current Outpatient Medications:     allopurinol (ZYLOPRIM) 100 mg tablet, TAKE 2 TABLETS BY MOUTH DAILY, Disp: 90 tablet, Rfl: 3    aspirin 81 mg chewable tablet, Chew 81 mg daily, Disp: , Rfl:     B Complex Vitamins (VITAMIN B COMPLEX PO), Take by mouth, Disp: , Rfl:     Cholecalciferol (Vitamin D3) 50 MCG (2000 UT) capsule, TAKE ONE (1) CAPSULE BY MOUTH DAILY, Disp: , Rfl:     colchicine (COLCRYS) 0.6 mg tablet, TAKE 2 TABLETS BY MOUTH, THEN 1 HOUR LATER TAKE 1 TABLET BY MOUTH AS NEEDED FOR ACUTE GOUT FLARE, Disp: 15 tablet, Rfl: 5    ferrous sulfate 325 (65 FE) MG EC tablet, TAKE 1 TABLET (324 MG TOTAL) BY MOUTH DAILY BEFORE BREAKFAST, Disp: 90 tablet, " Rfl: 1    lisinopril (ZESTRIL) 20 mg tablet, TAKE 1 TABLET (20 MG TOTAL) BY MOUTH DAILY, Disp: 90 tablet, Rfl: 1    metFORMIN (GLUCOPHAGE) 1000 MG tablet, TAKE 1 TABLET (1,000 MG TOTAL) BY MOUTH 2 (TWO) TIMES A DAY WITH MEALS, Disp: 180 tablet, Rfl: 1    Misc Natural Products (Osteo Bi-Flex/5-Loxin Advanced) TABS, Take by mouth, Disp: , Rfl:     Alcohol Swabs (Pharmacist Choice Alcohol) PADS, Use as directed, Disp: , Rfl:     fluticasone (FLONASE) 50 mcg/act nasal spray, 1 spray into each nostril daily (Patient not taking: Reported on 2024), Disp: 16 g, Rfl: 1    glucose blood test strip, MEDICARE B, Disp: , Rfl:     Lancet Devices (Adjustable Lancing Device) MISC, MEDICARE B, Disp: 1 each, Rfl: 1    LIFESCAN FINEPOINT LANCETS MISC, by Does not apply route daily, Disp: 100 each, Rfl: 3    meloxicam (Mobic) 7.5 mg tablet, Take 1 tablet (7.5 mg total) by mouth daily for 10 days (Patient not taking: Reported on 2024), Disp: 10 tablet, Rfl: 0    naproxen (Naprosyn) 500 mg tablet, Take 1 tablet (500 mg total) by mouth 2 (two) times a day with meals for 5 days (Patient not taking: Reported on 2024), Disp: 10 tablet, Rfl: 0    nateglinide (STARLIX) 60 mg tablet, TAKE 1 TABLET (60 MG TOTAL) BY MOUTH 2 (TWO) TIMES A DAY WITH MEALS, Disp: 180 tablet, Rfl: 1    OneTouch Verio test strip, USE TO TEST BLOOD SUGAR DAILY AS DIRECTED, Disp: 100 each, Rfl: 3    Current Allergies     Allergies as of 2024    (No Known Allergies)            The following portions of the patient's history were reviewed and updated as appropriate: allergies, current medications, past family history, past medical history, past social history, past surgical history and problem list.     Past Medical History:   Diagnosis Date    Diabetes mellitus (HCC)     Hypertension     Osteoporosis     pt denies on PT eval 20       Past Surgical History:   Procedure Laterality Date     SECTION         Family History   Problem Relation  Age of Onset    Diabetes Mother     COPD Mother     Substance Abuse Mother          Medications have been verified.        Objective   BP (!) 177/78   Pulse 83   Temp 97.9 °F (36.6 °C)   Resp 20   SpO2 96%        Physical Exam     Physical Exam  Vitals and nursing note reviewed.   Constitutional:       General: She is not in acute distress.     Appearance: Normal appearance.   HENT:      Head: Normocephalic and atraumatic.      Right Ear: External ear normal.      Left Ear: External ear normal.      Mouth/Throat:      Mouth: Mucous membranes are moist.   Eyes:      Pupils: Pupils are equal, round, and reactive to light.   Cardiovascular:      Rate and Rhythm: Normal rate.   Pulmonary:      Effort: Pulmonary effort is normal.   Musculoskeletal:      Comments: Sitting in wheelchair. Much difficulty when attempting to stand due to pain. TTP over L SI joint.   R posterior upper calf to lower thigh with pain. Also has pain to lateral and medial knee. No swelling, erythema, warmth noted.    Skin:     General: Skin is warm and dry.   Neurological:      Mental Status: She is alert and oriented to person, place, and time. Mental status is at baseline.   Psychiatric:         Mood and Affect: Mood normal.         Behavior: Behavior normal.

## 2024-09-06 DIAGNOSIS — E61.1 IRON DEFICIENCY: ICD-10-CM

## 2024-09-07 RX ORDER — FERROUS SULFATE 325(65) MG
1 TABLET, DELAYED RELEASE (ENTERIC COATED) ORAL
Qty: 90 TABLET | Refills: 1 | Status: SHIPPED | OUTPATIENT
Start: 2024-09-07

## 2024-10-04 ENCOUNTER — TELEPHONE (OUTPATIENT)
Dept: FAMILY MEDICINE CLINIC | Facility: CLINIC | Age: 84
End: 2024-10-04

## 2024-10-07 DIAGNOSIS — M1A.4710 OTHER SECONDARY CHRONIC GOUT OF RIGHT ANKLE WITHOUT TOPHUS: ICD-10-CM

## 2024-10-08 RX ORDER — COLCHICINE 0.6 MG/1
TABLET ORAL
Qty: 15 TABLET | Refills: 5 | Status: SHIPPED | OUTPATIENT
Start: 2024-10-08 | End: 2024-10-18

## 2024-10-18 DIAGNOSIS — M1A.4710 OTHER SECONDARY CHRONIC GOUT OF RIGHT ANKLE WITHOUT TOPHUS: ICD-10-CM

## 2024-10-18 RX ORDER — COLCHICINE 0.6 MG/1
TABLET ORAL
Qty: 15 TABLET | Refills: 5 | Status: SHIPPED | OUTPATIENT
Start: 2024-10-18

## 2024-10-18 RX ORDER — ACETAMINOPHEN 160 MG
TABLET,DISINTEGRATING ORAL
Qty: 30 CAPSULE | Refills: 0 | Status: SHIPPED | OUTPATIENT
Start: 2024-10-18

## 2024-11-05 DIAGNOSIS — I10 ESSENTIAL HYPERTENSION: ICD-10-CM

## 2024-11-06 DIAGNOSIS — M1A.0390 CHRONIC GOUT OF WRIST, UNSPECIFIED CAUSE, UNSPECIFIED LATERALITY: ICD-10-CM

## 2024-11-06 RX ORDER — LISINOPRIL 20 MG/1
20 TABLET ORAL DAILY
Qty: 90 TABLET | Refills: 1 | Status: SHIPPED | OUTPATIENT
Start: 2024-11-06

## 2024-11-06 RX ORDER — ALLOPURINOL 100 MG/1
200 TABLET ORAL DAILY
Qty: 90 TABLET | Refills: 3 | Status: SHIPPED | OUTPATIENT
Start: 2024-11-06

## 2024-11-15 ENCOUNTER — OFFICE VISIT (OUTPATIENT)
Dept: FAMILY MEDICINE CLINIC | Facility: CLINIC | Age: 84
End: 2024-11-15
Payer: COMMERCIAL

## 2024-11-15 ENCOUNTER — RESULTS FOLLOW-UP (OUTPATIENT)
Dept: FAMILY MEDICINE CLINIC | Facility: CLINIC | Age: 84
End: 2024-11-15

## 2024-11-15 VITALS
HEART RATE: 105 BPM | SYSTOLIC BLOOD PRESSURE: 146 MMHG | OXYGEN SATURATION: 98 % | DIASTOLIC BLOOD PRESSURE: 84 MMHG | WEIGHT: 134.6 LBS | BODY MASS INDEX: 23.85 KG/M2 | TEMPERATURE: 98.4 F | HEIGHT: 63 IN

## 2024-11-15 DIAGNOSIS — E61.1 IRON DEFICIENCY: ICD-10-CM

## 2024-11-15 DIAGNOSIS — E83.52 HYPERCALCEMIA: ICD-10-CM

## 2024-11-15 DIAGNOSIS — R80.9 TYPE 2 DIABETES MELLITUS WITH MICROALBUMINURIA, WITHOUT LONG-TERM CURRENT USE OF INSULIN (HCC): Primary | ICD-10-CM

## 2024-11-15 DIAGNOSIS — E11.59 HYPERTENSION ASSOCIATED WITH DIABETES  (HCC): ICD-10-CM

## 2024-11-15 DIAGNOSIS — Z23 ENCOUNTER FOR IMMUNIZATION: ICD-10-CM

## 2024-11-15 DIAGNOSIS — Z00.00 MEDICARE ANNUAL WELLNESS VISIT, SUBSEQUENT: ICD-10-CM

## 2024-11-15 DIAGNOSIS — M1A.0390 CHRONIC GOUT OF WRIST, UNSPECIFIED CAUSE, UNSPECIFIED LATERALITY: ICD-10-CM

## 2024-11-15 DIAGNOSIS — I15.2 HYPERTENSION ASSOCIATED WITH DIABETES  (HCC): ICD-10-CM

## 2024-11-15 DIAGNOSIS — E78.5 HYPERLIPIDEMIA ASSOCIATED WITH TYPE 2 DIABETES MELLITUS  (HCC): ICD-10-CM

## 2024-11-15 DIAGNOSIS — E11.69 HYPERLIPIDEMIA ASSOCIATED WITH TYPE 2 DIABETES MELLITUS  (HCC): ICD-10-CM

## 2024-11-15 DIAGNOSIS — E11.29 TYPE 2 DIABETES MELLITUS WITH MICROALBUMINURIA, WITHOUT LONG-TERM CURRENT USE OF INSULIN (HCC): Primary | ICD-10-CM

## 2024-11-15 PROBLEM — Z97.2 WEARS DENTURES: Status: ACTIVE | Noted: 2024-11-15

## 2024-11-15 LAB
LEFT EYE DIABETIC RETINOPATHY: ABNORMAL
LEFT EYE IMAGE QUALITY: ABNORMAL
LEFT EYE MACULAR EDEMA: ABNORMAL
LEFT EYE OTHER RETINOPATHY: ABNORMAL
RIGHT EYE DIABETIC RETINOPATHY: ABNORMAL
RIGHT EYE IMAGE QUALITY: ABNORMAL
RIGHT EYE MACULAR EDEMA: ABNORMAL
RIGHT EYE OTHER RETINOPATHY: ABNORMAL
SEVERITY (EYE EXAM): ABNORMAL
SL AMB POCT HEMOGLOBIN AIC: 7.7 (ref ?–6.5)

## 2024-11-15 PROCEDURE — G0008 ADMIN INFLUENZA VIRUS VAC: HCPCS

## 2024-11-15 PROCEDURE — 99214 OFFICE O/P EST MOD 30 MIN: CPT | Performed by: FAMILY MEDICINE

## 2024-11-15 PROCEDURE — G0439 PPPS, SUBSEQ VISIT: HCPCS | Performed by: FAMILY MEDICINE

## 2024-11-15 PROCEDURE — 90662 IIV NO PRSV INCREASED AG IM: CPT

## 2024-11-15 PROCEDURE — 83036 HEMOGLOBIN GLYCOSYLATED A1C: CPT | Performed by: FAMILY MEDICINE

## 2024-11-15 NOTE — PROGRESS NOTES
Name: Tanisha Morrow      : 1940      MRN: [id number]  Encounter Provider: Raquel Rocha DO  Encounter Date: 11/15/2024   Encounter department: [facility]    Assessment & Plan  Type 2 diabetes mellitus with microalbuminuria, without long-term current use of insulin (HCC)  A1c 7.7% (from 8.2) -- improved, though still a bit above goal for age (would like at least 7.5 or lower). Will continue current regimen and reviewed diet modifications.   Eye exam collected  Foot exam as below   Lab Results   Component Value Date    HGBA1C 7.7 (A) 11/15/2024       Orders:    POCT hemoglobin A1c    IRIS Diabetic eye exam    CBC and differential; Future    Comprehensive metabolic panel; Future    Lipid panel; Future    TSH, 3rd generation with Free T4 reflex; Future    Hemoglobin A1C; Future    Albumin / creatinine urine ratio; Future    Hypertension associated with diabetes  (HCC)  Borderline, will continue current regimen  Lab Results   Component Value Date    HGBA1C 7.7 (A) 11/15/2024       Orders:    CBC and differential; Future    Comprehensive metabolic panel; Future    Lipid panel; Future    Hyperlipidemia associated with type 2 diabetes mellitus  (HCC)  Continue lifestyle modifications, update lipids prior to next visit   Lab Results   Component Value Date    HGBA1C 7.7 (A) 11/15/2024       Orders:    CBC and differential; Future    Comprehensive metabolic panel; Future    Lipid panel; Future    Hypercalcemia  Update levels prior to next visit -- if persistently abnormal, may benefit from f/u with Endo   Orders:    Comprehensive metabolic panel; Future    Vitamin D 25 hydroxy; Future    Phosphorus; Future    PTH, intact; Future    Chronic gout of wrist, unspecified cause, unspecified laterality  A few recent flares, though uric acid has been in good range on recent checks. Continue Allopurinol for prevention.   Orders:    Uric acid; Future    Iron deficiency    Orders:    Iron Panel (Includes  Ferritin, Iron Sat%, Iron, and TIBC); Future    Medicare annual wellness visit, subsequent         Encounter for immunization    Orders:    influenza vaccine, high-dose, PF 0.5 mL (Fluzone High Dose)       Preventive health issues were discussed with patient, and age appropriate screening tests were ordered as noted in patient's After Visit Summary. Personalized health advice and appropriate referrals for health education or preventive services given if needed, as noted in patient's After Visit Summary.    History of Present Illness       Pt presents with her niece for chronic follow up, AWV     DM: Home sugars none; diet is unchanged  HTN: Home BPs none   HLD: Not on statin   Hypercalcemia:   Gout: Has had a few flares over the past few months -- improves with Colchicine     Saw Ortho -- given L shoulder injection, recommended PT -- showed her HEP      Patient Care Team:  Raquel Rocha DO as PCP - General (Family Medicine)  VERONIQUE Armas as PCP - Endocrinology (Endocrinology)    Review of Systems   Constitutional:  Negative for unexpected weight change.   HENT:  Negative for congestion, ear pain, rhinorrhea and sore throat.    Eyes:  Negative for visual disturbance.   Respiratory:  Negative for cough and shortness of breath.    Cardiovascular:  Negative for chest pain, palpitations and leg swelling.   Gastrointestinal:  Negative for abdominal pain, blood in stool, constipation and diarrhea.   Endocrine: Negative for polyuria.   Genitourinary:  Negative for dysuria and hematuria.   Musculoskeletal:  Positive for arthralgias.   Neurological:  Negative for dizziness and headaches.   Psychiatric/Behavioral:  Negative for sleep disturbance.      Medical History Reviewed by provider this encounter:       Annual Wellness Visit Questionnaire   Tanisha is here for her Subsequent Wellness visit.     Health Risk Assessment:   Patient rates overall health as fair. Patient feels that their physical health rating  is slightly worse. Patient is satisfied with their life. Eyesight was rated as same. Hearing was rated as same. Patient feels that their emotional and mental health rating is slightly worse. Patients states they are never, rarely angry. Patient states they are often unusually tired/fatigued. Pain experienced in the last 7 days has been some. Patient's pain rating has been 5/10. Patient states that she has experienced no weight loss or gain in last 6 months.     Depression Screening:   PHQ-2 Score: 1      Fall Risk Screening:   In the past year, patient has experienced: no history of falling in past year      Urinary Incontinence Screening:   Patient has not leaked urine accidently in the last six months.     Home Safety:  Patient has trouble with stairs inside or outside of their home. Patient has working smoke alarms and has working carbon monoxide detector. Home safety hazards include: none.     Nutrition:   Current diet is Diabetic.     Medications:   Patient is not currently taking any over-the-counter supplements. Patient is able to manage medications.     Activities of Daily Living (ADLs)/Instrumental Activities of Daily Living (IADLs):   Walk and transfer into and out of bed and chair?: Yes  Dress and groom yourself?: Yes    Bathe or shower yourself?: Yes    Feed yourself? Yes  Do your laundry/housekeeping?: Yes  Manage your money, pay your bills and track your expenses?: Yes  Make your own meals?: Yes    Do your own shopping?: Yes    Durable Medical Equipment Suppliers  N/A    Previous Hospitalizations:   Any hospitalizations or ED visits within the last 12 months?: No      Advance Care Planning:   Living will: No    ACP document given: Yes      Cognitive Screening:   Provider or family/friend/caregiver concerned regarding cognition?: No    PREVENTIVE SCREENINGS      Cardiovascular Screening:    General: Screening Not Indicated and History Lipid Disorder      Diabetes Screening:     General: Screening Not  "Indicated and History Diabetes      Colorectal Cancer Screening:     General: Screening Not Indicated      Breast Cancer Screening:     General: Screening Not Indicated      Cervical Cancer Screening:    General: Screening Not Indicated      Osteoporosis Screening:    General: Screening Not Indicated      Abdominal Aortic Aneurysm (AAA) Screening:        General: Screening Not Indicated      Lung Cancer Screening:     General: Screening Not Indicated      Hepatitis C Screening:    General: Screening Not Indicated    Screening, Brief Intervention, and Referral to Treatment (SBIRT)    Screening  Typical number of drinks in a day: 0  Typical number of drinks in a week: 0  Interpretation: Low risk drinking behavior.    Single Item Drug Screening:  How often have you used an illegal drug (including marijuana) or a prescription medication for non-medical reasons in the past year? never    Single Item Drug Screen Score: 0  Interpretation: Negative screen for possible drug use disorder    Social Drivers of Health     Financial Resource Strain: Low Risk  (8/29/2023)    Overall Financial Resource Strain (CARDIA)     Difficulty of Paying Living Expenses: Not very hard   Food Insecurity: No Food Insecurity (11/15/2024)    Hunger Vital Sign     Worried About Running Out of Food in the Last Year: Never true     Ran Out of Food in the Last Year: Never true   Transportation Needs: No Transportation Needs (11/15/2024)    PRAPARE - Transportation     Lack of Transportation (Medical): No     Lack of Transportation (Non-Medical): No   Housing Stability: Low Risk  (11/15/2024)    Housing Stability Vital Sign     Unable to Pay for Housing in the Last Year: No     Number of Times Moved in the Last Year: 1     Homeless in the Last Year: No   Utilities: Not At Risk (11/15/2024)    [facility] Utilities     Threatened with loss of utilities: No     No results found.    Objective   /84   Pulse 105   Temp 98.4 °F (36.9 °C)   Ht 5' 3\" (1.6 m)  "  Wt 61.1 kg (134 lb 9.6 oz)   SpO2 98%   BMI 23.84 kg/m²     Physical Exam  Vitals and nursing note reviewed.   Constitutional:       General: She is not in acute distress.     Appearance: She is well-developed.   HENT:      Head: Normocephalic and atraumatic.      Right Ear: Tympanic membrane, ear canal and external ear normal.      Left Ear: Tympanic membrane, ear canal and external ear normal.      Nose: Nose normal. No rhinorrhea.      Mouth/Throat:      Mouth: Mucous membranes are moist.      Pharynx: No oropharyngeal exudate or posterior oropharyngeal erythema.   Eyes:      Conjunctiva/sclera: Conjunctivae normal.   Cardiovascular:      Rate and Rhythm: Normal rate and regular rhythm.      Pulses: no weak pulses.           Dorsalis pedis pulses are 2+ on the right side and 2+ on the left side.   Pulmonary:      Effort: Pulmonary effort is normal. No respiratory distress.      Breath sounds: Normal breath sounds.   Abdominal:      General: Bowel sounds are normal. There is no distension.      Palpations: Abdomen is soft.      Tenderness: There is no abdominal tenderness.   Musculoskeletal:      Right lower leg: No edema.      Left lower leg: No edema.   Feet:      Right foot:      Skin integrity: Dry skin present. No callus.      Left foot:      Skin integrity: Dry skin present. No callus.   Lymphadenopathy:      Cervical: No cervical adenopathy.   Skin:     General: Skin is warm and dry.   Neurological:      Mental Status: She is alert.      Comments: Grossly intact   Psychiatric:         Mood and Affect: Mood normal.       Patient's shoes and socks removed.    Right Foot/Ankle   Right Foot Inspection  Skin Exam: dry skin. No callus and no callus.     Toe Exam: ROM and strength within normal limits.  no right toe deformity    Sensory   Vibration: intact  Monofilament testing: intact    Vascular  Capillary refills: < 3 seconds  The right DP pulse is 2+.     Left Foot/Ankle  Left Foot Inspection  Skin Exam:  dry skin. No callus.     Toe Exam: ROM and strength within normal limits. No left toe deformity.     Sensory   Vibration: intact  Monofilament testing: intact    Vascular  Capillary refills: < 3 seconds  The left DP pulse is 2+.     Assign Risk Category  No deformity present  No loss of protective sensation  No weak pulses  Risk: 0          Flu given, COVID defers, TDap defers, Shingles defers, Pneumo defers

## 2024-11-15 NOTE — ASSESSMENT & PLAN NOTE
Borderline, will continue current regimen  Lab Results   Component Value Date    HGBA1C 7.7 (A) 11/15/2024       Orders:    CBC and differential; Future    Comprehensive metabolic panel; Future    Lipid panel; Future

## 2024-11-15 NOTE — ASSESSMENT & PLAN NOTE
A few recent flares, though uric acid has been in good range on recent checks. Continue Allopurinol for prevention.   Orders:    Uric acid; Future

## 2024-11-15 NOTE — ASSESSMENT & PLAN NOTE
Continue lifestyle modifications, update lipids prior to next visit   Lab Results   Component Value Date    HGBA1C 7.7 (A) 11/15/2024       Orders:    CBC and differential; Future    Comprehensive metabolic panel; Future    Lipid panel; Future

## 2024-11-15 NOTE — ASSESSMENT & PLAN NOTE
Update levels prior to next visit -- if persistently abnormal, may benefit from f/u with Endo   Orders:    Comprehensive metabolic panel; Future    Vitamin D 25 hydroxy; Future    Phosphorus; Future    PTH, intact; Future

## 2024-11-15 NOTE — ASSESSMENT & PLAN NOTE
A1c 7.7% (from 8.2) -- improved, though still a bit above goal for age (would like at least 7.5 or lower). Will continue current regimen and reviewed diet modifications.   Eye exam collected  Foot exam as below   Lab Results   Component Value Date    HGBA1C 7.7 (A) 11/15/2024       Orders:    POCT hemoglobin A1c    IRIS Diabetic eye exam    CBC and differential; Future    Comprehensive metabolic panel; Future    Lipid panel; Future    TSH, 3rd generation with Free T4 reflex; Future    Hemoglobin A1C; Future    Albumin / creatinine urine ratio; Future

## 2024-11-18 NOTE — TELEPHONE ENCOUNTER
----- Message from Raquel Rocha, DO sent at 11/15/2024  4:45 PM EST -----  Please let pt know her eye exam was not able to be evaluated -- I would recommend she schedule a visit for full eye exam with optho. Can give [facility] info if she does not have a provider.

## 2024-12-09 DIAGNOSIS — E61.1 IRON DEFICIENCY: ICD-10-CM

## 2024-12-11 RX ORDER — FERROUS SULFATE 325(65) MG
1 TABLET, DELAYED RELEASE (ENTERIC COATED) ORAL
Qty: 90 TABLET | Refills: 1 | Status: SHIPPED | OUTPATIENT
Start: 2024-12-11

## 2024-12-14 ENCOUNTER — NURSE TRIAGE (OUTPATIENT)
Dept: OTHER | Facility: OTHER | Age: 84
End: 2024-12-14

## 2024-12-14 NOTE — TELEPHONE ENCOUNTER
"Regarding: vomiting/body aches  ----- Message from Negrita BUTT sent at 12/14/2024  7:58 AM EST -----  Pt stated, \"I am having body aches and I keep throwing up. I cannot hold down any fluids.\"    "

## 2024-12-14 NOTE — TELEPHONE ENCOUNTER
"Reason for Disposition  • [1] SEVERE vomiting (e.g., 6 or more times/day) AND [2] present > 8 hours (Exception: Patient sounds well, is drinking liquids, does not sound dehydrated, and vomiting has lasted less than 24 hours.)    Answer Assessment - Initial Assessment Questions  1. VOMITING SEVERITY: \"How many times have you vomited in the past 24 hours?\"       Thinks about 6x  2. ONSET: \"When did the vomiting begin?\"       friday  3. FLUIDS: \"What fluids or food have you vomited up today?\" \"Have you been able to keep any fluids down?\"      Not able to keep anything down  4. ABDOMEN PAIN: \"Are your having any abdomen pain?\" If Yes : \"How bad is it and what does it feel like?\" (e.g., crampy, dull, intermittent, constant)       denies  5. DIARRHEA: \"Is there any diarrhea?\" If Yes, ask: \"How many times today?\"       denies  6. CONTACTS: \"Is there anyone else in the family with the same symptoms?\"       denies  7. CAUSE: \"What do you think is causing your vomiting?\"      Stomach bug  8. HYDRATION STATUS: \"Any signs of dehydration?\" (e.g., dry mouth [not only dry lips], too weak to stand) \"When did you last urinate?\"      Neck pain when she gets up. Pt reports peeing in last 8 hours.  9. OTHER SYMPTOMS: \"Do you have any other symptoms?\" (e.g., fever, headache, vertigo, vomiting blood or coffee grounds, recent head injury)  Body aches, abdominal cramping    Pt actively vomiting while on phone.    Protocols used: Vomiting-Adult-AH    "

## 2024-12-16 NOTE — TELEPHONE ENCOUNTER
I spoke with patient, she is feeling a bit better. I offered her an appointment however she declined. She no longer has body aches, she feels that tomorrow she may be recovered from her illness. Advised her to call if she changed her mind or if her symptoms get worse.

## 2024-12-19 DIAGNOSIS — E11.29 TYPE 2 DIABETES MELLITUS WITH MICROALBUMINURIA, WITHOUT LONG-TERM CURRENT USE OF INSULIN (HCC): ICD-10-CM

## 2024-12-19 DIAGNOSIS — R80.9 TYPE 2 DIABETES MELLITUS WITH MICROALBUMINURIA, WITHOUT LONG-TERM CURRENT USE OF INSULIN (HCC): ICD-10-CM

## 2024-12-19 RX ORDER — NATEGLINIDE 60 MG/1
60 TABLET ORAL 2 TIMES DAILY WITH MEALS
Qty: 180 TABLET | Refills: 1 | Status: SHIPPED | OUTPATIENT
Start: 2024-12-19 | End: 2024-12-27

## 2024-12-23 PROBLEM — N17.9 ACUTE KIDNEY FAILURE (HCC): Status: ACTIVE | Noted: 2024-01-01

## 2024-12-23 PROBLEM — E87.1 HYPONATREMIA: Status: ACTIVE | Noted: 2024-01-01

## 2024-12-23 PROBLEM — R93.5 ABNORMAL CT OF THE ABDOMEN: Status: ACTIVE | Noted: 2024-01-01

## 2024-12-23 PROBLEM — R65.10 SIRS (SYSTEMIC INFLAMMATORY RESPONSE SYNDROME) (HCC): Status: ACTIVE | Noted: 2024-01-01

## 2024-12-23 PROBLEM — E87.3 METABOLIC ALKALOSIS: Status: ACTIVE | Noted: 2024-01-01

## 2024-12-23 NOTE — ASSESSMENT & PLAN NOTE
Meet SIRS criteria with heart rate 100 and leukocytosis 15.1, likely in setting of significant dehydration, no known acute source of infection  Obtain lactic acid  CT abdomen pelvis pending  Continue to rule out versus in sepsis  Trend WBC  Received fluid bolus therapy in ED, will defer antibiotics at this time as low suspicion for ongoing bacterial infection

## 2024-12-23 NOTE — ASSESSMENT & PLAN NOTE
Lab Results   Component Value Date    HGBA1C 7.7 (A) 11/15/2024   Reasonable control though will monitor closely    Recent Labs     12/23/24  1315   POCGLU 371*       Blood Sugar Average: Last 72 hrs:  (P) 371

## 2024-12-23 NOTE — CONSULTS
Consultation - Nephrology   Tanisha Morrow 84 y.o. female MRN: [id number]  Unit/Bed#: -01 Encounter: [id number]    Referring PHYSICIAN: Jose Enriquez    REASON FOR THE CONSULTATION: Acute kidney injury    DATE OF CONSULTATION: December 23, 2024    ADMISSION DIAGNOSIS: Acute kidney failure (HCC)     PLAN / RECOMMENDATIONS      Assessment & Plan  Acute kidney failure (HCC)  Patient does have acute kidney injury.  Clinically she does appear dehydrated and with the vomiting and not eating well for last 1 week that is likely etiology the patient does have a high anion gap.  Patient does appear alkalotic though with pH being on all current side    At this point I will continue hydration monitor blood test if kidney function does not get better she may need to go on dialysis with profound azotemia    Patient had a CT scan which rule out any hydronephrosis    Patient denied taking nonsteroidal painkiller  Type 2 diabetes mellitus with microalbuminuria, without long-term current use of insulin ([facility])  Lab Results   Component Value Date    HGBA1C 7.7 (A) 11/15/2024   Not well-controlled being monitored by primary doctor.  Patient was on metformin which can cause lactic acid and acute kidney injury    Recent Labs     12/23/24  1315   POCGLU 371*       Blood Sugar Average: Last 72 hrs:  (P) 371    Hypertension associated with diabetes  ([facility])  Lab Results   Component Value Date    HGBA1C 7.7 (A) 11/15/2024   Reasonable control though will monitor closely    Recent Labs     12/23/24  1315   POCGLU 371*       Blood Sugar Average: Last 72 hrs:  (P) 371    SIRS (systemic inflammatory response syndrome) ([facility])    Metabolic alkalosis  Patient CO2 is 24 with venous pH 7 SIDE making alkalotic.  Also has a high anion gap of 37 which is quite unusual.  Will monitor closely by repeating blood test  Hyponatremia  Likely related to volume.  Will monitor closely    Discussed everything at length with the family and slim.  Repeat blood test  if it does not get better patient will need dialysis and I got consent from the daughter    Thank you for the consultation to participate in patient's care. I have personally discussed my plan with the referring physician  CHIEF COMPLAINT     Patient came to emergency room with 1 week history of nausea vomiting and not eating well and was found for acute kidney injury    HPI     Patient is a history of diabetes and hypertension who is quite active still working    About a week ago started having abdominal pain with vomiting.  Patient was not eating or drinking well with some fluctuation.  Denies taking any new medication    Patient was on ACE inhibitor and metformin at home    Patient condition was not improving the daughter decided to bring her to the hospital    Emergency patient was found to be dehydrated.  Blood work revealed profound azotemia with BUN of 200 and a creatinine of 211 and patient being admitted    PAST MEDICAL HISTORY     Past Medical History:   Diagnosis Date    Diabetes mellitus (HCC)     Hypertension     Osteoporosis     pt denies on PT eval 20       PAST SURGICAL HISTORY     Past Surgical History:   Procedure Laterality Date     SECTION         ALLERGIES     No Known Allergies    SOCIAL HISTORY     Social History     Substance and Sexual Activity   Alcohol Use Never     Social History     Substance and Sexual Activity   Drug Use Never     Social History     Tobacco Use   Smoking Status Never   Smokeless Tobacco Never       FAMILY HISTORY     Family History   Problem Relation Age of Onset    Diabetes Mother     COPD Mother     Substance Abuse Mother        CURRENT MEDICATIONS       Current Facility-Administered Medications:     sodium chloride 0.9 % bolus 2,000 mL, 2,000 mL, Intravenous, Once, Jose Enriquez MD, Last Rate: 1,000 mL/hr at 24 1454, 2,000 mL at 24 1454    REVIEW OF SYSTEMS     Review of Systems   Constitutional:  Negative for fatigue.   HENT:  Negative for  congestion.    Eyes:  Negative for photophobia and pain.   Respiratory:  Negative for chest tightness and shortness of breath.    Cardiovascular:  Negative for chest pain and palpitations.   Gastrointestinal:  Positive for abdominal pain, nausea and vomiting. Negative for abdominal distention and blood in stool.   Endocrine: Negative for polydipsia.   Genitourinary:  Negative for difficulty urinating, dysuria, flank pain, hematuria and urgency.   Musculoskeletal:  Negative for arthralgias and back pain.   Skin:  Negative for rash.   Neurological:  Negative for dizziness, light-headedness and headaches.   Hematological:  Does not bruise/bleed easily.   Psychiatric/Behavioral:  Negative for behavioral problems. The patient is not nervous/anxious.        LAB RESULTS        Results from last 7 days   Lab Units 12/23/24  1333   WBC Thousand/uL 15.10*   HEMOGLOBIN g/dL 12.1   HEMATOCRIT % 37.1   PLATELETS Thousands/uL 604*   POTASSIUM mmol/L 5.5*   CHLORIDE mmol/L 63*   CO2 mmol/L 24   BUN mg/dL 205*   CREATININE mg/dL 8.34*   EGFR ml/min/1.73sq m 4   CALCIUM mg/dL 8.8   MAGNESIUM mg/dL 2.2       I have personally reviewed the old medical records and patient's previously known baseline creatinine level is ~0.9    RADIOLOGY RESULTS     No results found for this or any previous visit.    No results found for this or any previous visit.    No results found for this or any previous visit.    No results found for this or any previous visit.    No results found for this or any previous visit.    No results found for this or any previous visit.      OBJECTIVE     Current Weight:    Vitals:    12/23/24 1533   BP:    Pulse: 66   Resp:    Temp:    SpO2: 98%     No intake or output data in the 24 hours ending 12/23/24 1543    PHYSICAL EXAMINATION     Physical Exam  Constitutional:       General: She is not in acute distress.     Appearance: She is well-developed.   HENT:      Head: Normocephalic.      Mouth/Throat:      Mouth:  "Mucous membranes are dry.   Eyes:      General: No scleral icterus.     Conjunctiva/sclera: Conjunctivae normal.   Neck:      Vascular: No JVD.   Cardiovascular:      Rate and Rhythm: Normal rate.      Heart sounds: Normal heart sounds.   Pulmonary:      Effort: Pulmonary effort is normal.      Breath sounds: No wheezing.   Abdominal:      Palpations: Abdomen is soft.      Tenderness: There is abdominal tenderness.   Musculoskeletal:         General: Normal range of motion.      Cervical back: Neck supple.   Skin:     General: Skin is warm and dry.      Findings: No rash.   Neurological:      Mental Status: She is alert and oriented to person, place, and time.   Psychiatric:         Behavior: Behavior normal.          .     Stan Steel MD  Nephrology  12/23/2024        Portions of the record may have been created with voice recognition software. Occasional wrong word or \"sound a like\" substitutions may have occurred due to the inherent limitations of voice recognition software. Read the chart carefully and recognize, using context, where substitutions have occurred.  "

## 2024-12-23 NOTE — RESPIRATORY THERAPY NOTE
12/23/24 7169   Respiratory Assessment   Resp Comments ABG drawn and sent to lab   Respiratory Charges    $ Arterial Puncture ABG  Yes

## 2024-12-23 NOTE — ASSESSMENT & PLAN NOTE
Mild metabolic alkalosis with pH 7.437, pCO2 37, bicarb 24.4  Likely in setting of gastrointestinal hydrogen loss from significant nausea/vomiting for several days with poor p.o. intake  Obtain ABG  Obtain lactic acid  Start IVF  Anion gap 37, trend

## 2024-12-23 NOTE — ED PROVIDER NOTES
Time reflects when diagnosis was documented in both MDM as applicable and the Disposition within this note       Time User Action Codes Description Comment    12/23/2024  3:04 PM MinaJose méndez Hal [R53.1] Weakness     12/23/2024  3:04 PM Mina, Jose Hong [N17.9] Acute kidney injury (HCC)     12/23/2024  3:04 PM Mina, Jose Hong [E87.5] Hyperkalemia     12/23/2024  3:04 PM Mina Jose Hong [E11.65] Uncontrolled diabetes mellitus with hyperglycemia (HCC)     12/23/2024  3:26 PM Mina, Jose Hong [N17.9] Acute kidney failure (HCC)           ED Disposition       ED Disposition   Admit    Condition   Stable    Date/Time   Mon Dec 23, 2024  3:04 PM    Comment   Case was discussed with Hospitalist and the patient's admission status was agreed to be Admission Status: inpatient status to the service of Dr. Stockton .               Assessment & Plan       Medical Decision Making  Patient presents emergency department for weakness.  Differential diagnose includes but not limited to: Electrolyte disturbance, hypoglycemia, dehydration, LAVON/renal failure, atypical presentation of cardiac disease, thyroid disease, infection.    Patient has presented to the Emergency Department with exacerbation of chronic condition / pt with new illness-injury that may poses a threat to life or body function.  At least 3 different tests have been ordered on this patient AND reviewed the results.   Old laboratory data (of at least 2 tests) were reviewed from the medical records and compared to today's results  Discussion with patient and/or family members of results (normal and abnormal) and the implications for immediate and long term treatment/management.  Due to the high risk of morbidity further diagnostic testing and treatment is necessary.    3:26 PM  I discussed the case with the hospitalist. We reviewed the HPI, pertinent PMH, ED course and management.   Hospitalist agreed with plan and will admit the patient to the  [facility].    Medications  sodium chloride 0.9 % bolus 2,000 mL (2,000 mL Intravenous New Bag 12/23/24 1454)  sodium chloride 0.9 % bolus 1,000 mL (0 mL Intravenous Stopped 12/23/24 1454)  insulin regular (HumuLIN R,NovoLIN R) injection 6 Units (6 Units Intravenous Given 12/23/24 1341)    Consulted and discussed with nephrology    32 minutes of critical care management, excluding procedures.  Due to a high probability of clinically significant, life threatening deterioration, the patient required my highest level of preparedness to intervene emergently and I personally spent this critical care time directly and personally managing the patient. This critical care time included obtaining a history; examining the patient; pulse oximetry; ordering and review of studies; arranging urgent treatment with development of a management plan; evaluation of patient's response to treatment; frequent reassessments and re-examinations of the patient; and, discussions with other providers/consultants.   This critical care time was performed to assess and manage the high probability of imminent, life-threatening deterioration that could result in multi-organ failure. It was exclusive of separately billable procedures and treating other patients and teaching time.          Problems Addressed:  Acute kidney failure (HCC): acute illness or injury  Hyperkalemia: acute illness or injury  Uncontrolled diabetes mellitus with hyperglycemia (HCC): acute illness or injury  Weakness: acute illness or injury    Amount and/or Complexity of Data Reviewed  Labs: ordered. Decision-making details documented in ED Course.  Radiology: ordered.    Risk  OTC drugs.  Decision regarding hospitalization.        ED Course as of 12/23/24 1526   Mon Dec 23, 2024   1318 POC Glucose(!): 371   1344 WBC(!): 15.10   1344 Hemoglobin: 12.1   1344 Hematocrit: 37.1   1411 Beta- Hydroxybutyrate(!): 4.34       Medications   sodium chloride 0.9 % bolus 2,000 mL (2,000 mL  Intravenous New Bag 24 1454)   sodium chloride 0.9 % bolus 1,000 mL (0 mL Intravenous Stopped 24 1454)   insulin regular (HumuLIN R,NovoLIN R) injection 6 Units (6 Units Intravenous Given 24 1341)       ED Risk Strat Scores                          SBIRT 20yo+      Flowsheet Row Most Recent Value   Initial Alcohol Screen: US AUDIT-C     1. How often do you have a drink containing alcohol? 0 Filed at: 2024 3159   2. How many drinks containing alcohol do you have on a typical day you are drinking?  0 Filed at: 2024 1337   3a. Male UNDER 65: How often do you have five or more drinks on one occasion? 0 Filed at: 2024 1333   3b. FEMALE Any Age, or MALE 65+: How often do you have 4 or more drinks on one occassion? 0 Filed at: 2024 3522   Audit-C Score 0 Filed at: 2024 4122   RYLIE: How many times in the past year have you...    Used an illegal drug or used a prescription medication for non-medical reasons? Never Filed at: 2024 7382                            History of Present Illness       Chief Complaint   Patient presents with    Weakness - Generalized     Pt brought in by niece for generalized weakness for the past few days, pt has had decreased appetite due to nausea/vomiting that started on Monday through to Wednesday. Nausea/vomiting has now resolved, pt can tolerate PO fluids but has decreased appetite, and has not taken any medications since Wednesday.        Past Medical History:   Diagnosis Date    Diabetes mellitus (HCC)     Hypertension     Osteoporosis     pt denies on PT eval 20      Past Surgical History:   Procedure Laterality Date     SECTION        Family History   Problem Relation Age of Onset    Diabetes Mother     COPD Mother     Substance Abuse Mother       Social History     Tobacco Use    Smoking status: Never    Smokeless tobacco: Never   Vaping Use    Vaping status: Never Used   Substance Use Topics    Alcohol use: Never    Drug  use: Never      E-Cigarette/Vaping    E-Cigarette Use Never User       E-Cigarette/Vaping Substances    Nicotine No     THC No     CBD No     Flavoring No     Other No     Unknown No       I have reviewed and agree with the history as documented.     Tanisha Morrow is a 84 y.o.  year old female  Past Medical History:  No date: Diabetes mellitus (HCC)  No date: Hypertension  No date: Osteoporosis      Comment:  pt denies on PT eval 9-23-20  Social History    Tobacco Use      Smoking status: Never      Smokeless tobacco: Never    Vaping Use      Vaping status: Never Used    Alcohol use: Never    Drug use: Never    Patient presents with:  Weakness - Generalized: Pt brought in by niece for generalized weakness for the past few days, pt has had decreased appetite due to nausea/vomiting that started on Monday through to Wednesday. Nausea/vomiting has now resolved, pt can tolerate PO fluids but has decreased appetite, and has not taken any medications since Wednesday.                     History provided by:  Patient and relative   used: No        Review of Systems   Constitutional:  Negative for chills and fever.   HENT:  Negative for ear pain and sore throat.    Eyes:  Negative for pain and visual disturbance.   Respiratory:  Negative for cough and shortness of breath.    Cardiovascular:  Negative for chest pain and palpitations.   Gastrointestinal:  Positive for nausea and vomiting. Negative for abdominal pain.   Genitourinary:  Negative for dysuria and hematuria.   Musculoskeletal:  Negative for arthralgias and back pain.   Skin:  Negative for color change and rash.   Neurological:  Positive for weakness. Negative for seizures and syncope.   All other systems reviewed and are negative.          Objective       ED Triage Vitals [12/23/24 1303]   Temperature Pulse Blood Pressure Respirations SpO2 Patient Position - Orthostatic VS   98.1 °F (36.7 °C) 104 109/57 18 99 % Sitting      Temp Source Heart Rate  Source BP Location FiO2 (%) Pain Score    Temporal Monitor Left arm -- --      Vitals      Date and Time Temp Pulse SpO2 Resp BP Pain Score FACES Pain Rating User   12/23/24 1500 -- -- -- -- 126/60 -- -- [facility]   12/23/24 1303 98.1 °F (36.7 °C) 104 99 % 18 109/57 -- -- KW            Physical Exam  Vitals and nursing note reviewed.   Constitutional:       General: She is not in acute distress.     Appearance: Normal appearance. She is well-developed.   HENT:      Head: Normocephalic and atraumatic.      Right Ear: External ear normal.      Left Ear: External ear normal.      Nose: Nose normal.   Eyes:      Conjunctiva/sclera: Conjunctivae normal.   Cardiovascular:      Rate and Rhythm: Normal rate and regular rhythm.      Heart sounds: No murmur heard.  Pulmonary:      Effort: Pulmonary effort is normal. No respiratory distress.      Breath sounds: Normal breath sounds.   Abdominal:      Palpations: Abdomen is soft.      Tenderness: There is no abdominal tenderness.   Musculoskeletal:         General: No swelling.      Cervical back: Neck supple.      Right lower leg: No edema.      Left lower leg: No edema.   Skin:     General: Skin is warm and dry.      Capillary Refill: Capillary refill takes less than 2 seconds.   Neurological:      General: No focal deficit present.      Mental Status: She is alert and oriented to person, place, and time.   Psychiatric:         Mood and Affect: Mood normal.         Thought Content: Thought content normal.         Judgment: Judgment normal.         Results Reviewed       Procedure Component Value Units Date/Time    Blood gas, arterial [[id number]]     Lab Status: No result Specimen: Blood, Arterial     Basic metabolic panel [[id number]]     Lab Status: No result Specimen: Blood     Chloride, urine, random [[id number]]     Lab Status: No result Specimen: Urine     Sodium, urine, random [[id number]]     Lab Status: No result Specimen: Urine, Other     UA (URINE) with reflex to Scope  [[id number]]     Lab Status: No result Specimen: Urine, Other     Osmolality, urine [[id number]]     Lab Status: No result Specimen: Urine     Albumin / creatinine urine ratio [[id number]]     Lab Status: No result Specimen: Urine, Other     Creatinine, urine, random [[id number]]     Lab Status: No result Specimen: Urine     Urine Eosinophils [[id number]]     Lab Status: No result Specimen: Urine     Basic metabolic panel [[id number]]  (Abnormal) Collected: 12/23/24 1333    Lab Status: Final result Specimen: Blood from Arm, Right Updated: 12/23/24 1414     Sodium 124 mmol/L      Potassium 5.5 mmol/L      Chloride 63 mmol/L      CO2 24 mmol/L      ANION GAP 37 mmol/L       mg/dL      Creatinine 8.34 mg/dL      Glucose 379 mg/dL      Calcium 8.8 mg/dL      eGFR 4 ml/min/1.73sq m     Narrative:      National Kid[employer] guidelines for Chronic Kidney Disease (CKD):     Stage 1 with normal or high GFR (GFR > 90 mL/min/1.73 square meters)    Stage 2 Mild CKD (GFR = 60-89 mL/min/1.73 square meters)    Stage 3A Moderate CKD (GFR = 45-59 mL/min/1.73 square meters)    Stage 3B Moderate CKD (GFR = 30-44 mL/min/1.73 square meters)    Stage 4 Severe CKD (GFR = 15-29 mL/min/1.73 square meters)    Stage 5 End Stage CKD (GFR <15 mL/min/1.73 square meters)  Note: GFR calculation is accurate only with a steady state creatinine    Magnesium [[id number]]  (Normal) Collected: 12/23/24 1333    Lab Status: Final result Specimen: Blood from Arm, Right Updated: 12/23/24 1414     Magnesium 2.2 mg/dL     Beta Hydroxybutyrate [[id number]]  (Abnormal) Collected: 12/23/24 1333    Lab Status: Final result Specimen: Blood from Arm, Right Updated: 12/23/24 1354     Beta- Hydroxybutyrate 4.34 mmol/L     Blood gas, venous [[id number]]  (Abnormal) Collected: 12/23/24 1333    Lab Status: Final result Specimen: Blood from Arm, Right Updated: 12/23/24 1340     pH, Luis Alfredo 7.437     pCO2, Luis Alfredo 37.0 mm Hg      pO2, Luis Alfredo 35.5 mm Hg      HCO3, Luis Alfredo 24.4  mmol/L      Base Excess, Luis Alfredo 0.5 mmol/L      O2 Content, Luis Alfredo 10.5 ml/dL      O2 HGB, VENOUS 58.5 %     CBC and differential [[id number]]  (Abnormal) Collected: 12/23/24 1333    Lab Status: Final result Specimen: Blood from Arm, Right Updated: 12/23/24 1339     WBC 15.10 Thousand/uL      RBC 4.43 Million/uL      Hemoglobin 12.1 g/dL      Hematocrit 37.1 %      MCV 84 fL      MCH 27.3 pg      MCHC 32.6 g/dL      RDW 15.9 %      MPV 9.6 fL      Platelets 604 Thousands/uL      nRBC 0 /100 WBCs      Segmented % 86 %      Immature Grans % 0 %      Lymphocytes % 9 %      Monocytes % 5 %      Eosinophils Relative 0 %      Basophils Relative 0 %      Absolute Neutrophils 12.95 Thousands/µL      Absolute Immature Grans 0.05 Thousand/uL      Absolute Lymphocytes 1.40 Thousands/µL      Absolute Monocytes 0.69 Thousand/µL      Eosinophils Absolute 0.00 Thousand/µL      Basophils Absolute 0.01 Thousands/µL     UA w Reflex to Microscopic w Reflex to Culture [[id number]]     Lab Status: No result Specimen: Urine, Clean Catch     Fingerstick Glucose (POCT) [[id number]]  (Abnormal) Collected: 12/23/24 1315    Lab Status: Final result Specimen: Blood Updated: 12/23/24 1315     POC Glucose 371 mg/dl             CT abdomen pelvis wo contrast    (Results Pending)       Procedures    ED Medication and Procedure Management   Prior to Admission Medications   Prescriptions Last Dose Informant Patient Reported? Taking?   Alcohol Swabs (Pharmacist Choice Alcohol) PADS  Self Yes No   Sig: Use as directed   B Complex Vitamins (VITAMIN B COMPLEX PO)   Yes No   Sig: Take by mouth   Cholecalciferol (Vitamin D3) 50 MCG (2000 UT) capsule   No No   Sig: TAKE ONE (1) CAPSULE BY MOUTH DAILY   Pryv LANCETS MISC  Self No No   Sig: by Does not apply route daily   Lancet Devices (Adjustable Lancing Device) MISC   No No   Sig: MEDICARE B   Misc Natural Products (Osteo Bi-Flex/5-Loxin Advanced) TABS   Yes No   Sig: Take by mouth   OneTouch Verio  test strip   No No   Sig: USE TO TEST BLOOD SUGAR DAILY AS DIRECTED   allopurinol (ZYLOPRIM) 100 mg tablet   No No   Sig: TAKE 2 TABLETS BY MOUTH DAILY   aspirin 81 mg chewable tablet  Self Yes No   Sig: Chew 81 mg daily   colchicine (COLCRYS) 0.6 mg tablet   No No   Sig: TAKE 2 TABLETS BY MOUTH, THEN 1 HOUR LATER TAKE 1 TABLET BY MOUTH AS NEEDED FOR ACUTE GOUT FLARE   ferrous sulfate 325 (65 FE) MG EC tablet   No No   Sig: TAKE 1 TABLET (324 MG TOTAL) BY MOUTH DAILY BEFORE BREAKFAST   glucose blood test strip   Yes No   Sig: MEDICARE B   lisinopril (ZESTRIL) 20 mg tablet   No No   Sig: TAKE 1 TABLET (20 MG TOTAL) BY MOUTH DAILY   metFORMIN (GLUCOPHAGE) 1000 MG tablet   No No   Sig: TAKE 1 TABLET (1,000 MG TOTAL) BY MOUTH 2 (TWO) TIMES A DAY WITH MEALS   nateglinide (STARLIX) 60 mg tablet   No No   Sig: TAKE 1 TABLET (60 MG TOTAL) BY MOUTH 2 (TWO) TIMES A DAY WITH MEALS      Facility-Administered Medications: None     Current Discharge Medication List        CONTINUE these medications which have NOT CHANGED    Details   Alcohol Swabs (Pharmacist Choice Alcohol) PADS Use as directed      allopurinol (ZYLOPRIM) 100 mg tablet TAKE 2 TABLETS BY MOUTH DAILY  Qty: 90 tablet, Refills: 3    Associated Diagnoses: Chronic gout of wrist, unspecified cause, unspecified laterality      aspirin 81 mg chewable tablet Chew 81 mg daily      B Complex Vitamins (VITAMIN B COMPLEX PO) Take by mouth      Cholecalciferol (Vitamin D3) 50 MCG (2000 UT) capsule TAKE ONE (1) CAPSULE BY MOUTH DAILY  Qty: 30 capsule, Refills: 0    Associated Diagnoses: 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 glucose blood test strip MEDICARE B      Lancet Devices (Adjustable Lancing Device) MISC MEDICARE B  Qty: 1 each, Refills: 1    Associated Diagnoses: Type 2 diabetes mellitus with microalbuminuria, without long-term current use of insulin (HCC)      LIFESCAN FINEPOINT LANCETS MISC by Does not apply route daily  Qty: 100 each, Refills: 3    Associated Diagnoses: Type 2 diabetes mellitus without complication, without long-term current use of insulin ([facility])      lisinopril (ZESTRIL) 20 mg tablet TAKE 1 TABLET (20 MG TOTAL) BY MOUTH DAILY  Qty: 90 tablet, Refills: 1    Associated Diagnoses: Essential hypertension      metFORMIN (GLUCOPHAGE) 1000 MG tablet TAKE 1 TABLET (1,000 MG TOTAL) BY MOUTH 2 (TWO) TIMES A DAY WITH MEALS  Qty: 180 tablet, Refills: 1    Associated Diagnoses: Type 2 diabetes mellitus with microalbuminuria, without long-term current use of insulin (HCC)      [facility] Natural Products (Osteo Bi-Flex/5-Loxin Advanced) TABS Take by mouth      nateglinide (STARLIX) 60 mg tablet TAKE 1 TABLET (60 MG TOTAL) BY MOUTH 2 (TWO) TIMES A DAY WITH MEALS  Qty: 180 tablet, Refills: 1    Associated Diagnoses: Type 2 diabetes mellitus with microalbuminuria, without long-term current use of insulin ([facility])      !! OneTouch Verio test strip USE TO TEST BLOOD SUGAR DAILY AS DIRECTED  Qty: 100 each, Refills: 3    Associated Diagnoses: Type 2 diabetes mellitus without complication, without long-term current use of insulin ([facility])       !! - Potential duplicate medications found. Please discuss with provider.        No discharge procedures on file.  ED SEPSIS DOCUMENTATION   Time reflects when diagnosis was documented in both MDM as applicable and the Disposition within this note       Time User Action Codes Description Comment    12/23/2024  3:04 PM Jose Enriquez Add [R53.1] Weakness     12/23/2024  3:04 PM Jose Enriquez Add [N17.9] Acute  kidney injury (HCC)     12/23/2024  3:04 PM Jose Enriquez [E87.5] Hyperkalemia     12/23/2024  3:04 PM Jose Enriquez [E11.65] Uncontrolled diabetes mellitus with hyperglycemia (HCC)     12/23/2024  3:26 PM Jose Enriuqez [N17.9] Acute kidney failure (HCC)                  Jose Enriquez MD  12/23/24 1526

## 2024-12-23 NOTE — ASSESSMENT & PLAN NOTE
Lab Results   Component Value Date    HGBA1C 7.7 (A) 11/15/2024       Recent Labs     12/23/24  1315   POCGLU 371*       Blood Sugar Average: Last 72 hrs:  (P) 371    Outpatient medications include lisinopril, will hold in setting of acute renal failure  Blood pressure currently normotensive, monitor closely

## 2024-12-23 NOTE — ASSESSMENT & PLAN NOTE
Lab Results   Component Value Date    HGBA1C 7.7 (A) 11/15/2024   Not well-controlled being monitored by primary doctor.  Patient was on metformin which can cause lactic acid and acute kidney injury    Recent Labs     12/23/24  1315   POCGLU 371*       Blood Sugar Average: Last 72 hrs:  (P) 371

## 2024-12-23 NOTE — ASSESSMENT & PLAN NOTE
Patient does have acute kidney injury.  Clinically she does appear dehydrated and with the vomiting and not eating well for last 1 week that is likely etiology the patient does have a high anion gap.  Patient does appear alkalotic though with pH being on all current side    At this point I will continue hydration monitor blood test if kidney function does not get better she may need to go on dialysis with profound azotemia    Patient had a CT scan which rule out any hydronephrosis    Patient denied taking nonsteroidal painkiller

## 2024-12-23 NOTE — ASSESSMENT & PLAN NOTE
Patient CO2 is 24 with venous pH 7 SIDE making alkalotic.  Also has a high anion gap of 37 which is quite unusual.  Will monitor closely by repeating blood test

## 2024-12-23 NOTE — H&P
H&P - Hospitalist   Name: Tanisha Morrow 84 y.o. female I MRN: [id number]  Unit/Bed#: -[facility] I Date of Admission: 12/23/2024   Date of Service: 12/23/2024 I Hospital Day: 0     Assessment & Plan  Acute kidney failure (HCC)  Patient initially presented after having significant nausea and vomiting 12/13 to 12/15.  The patient continued to have poor oral intake and general malaise.  She continued to have lack of improvement the patient ultimately decided to be seen in the ED.    Creatinine on arrival 8.34 with , baseline creatinine 0.6-0.9  Sodium 125, potassium 5.5  Nephrology consulted  Obtain PVR/bladder scan  Start IVF  Dyer catheter for strict I's/O's  Trend BMP every 6 hours  May ultimately need dialysis, however we will first trial IVF  Obtain echocardiogram to determine aggressiveness of IVF  Obtain urine chloride, urine creatinine, urine osmole, PTH, urine sodium  Hold nephrotoxins which includes patient's home medications of metformin and lisinopril  Obtain lactic acid  CT abdomen pelvis pending  Appears clinically dry on exam  SIRS (systemic inflammatory response syndrome) (HCC)  Meet SIRS criteria with heart rate 100 and leukocytosis 15.1, likely in setting of significant dehydration, no known acute source of infection  Obtain lactic acid  CT abdomen pelvis pending  Continue to rule out versus in sepsis  Trend WBC  Received fluid bolus therapy in ED, will defer antibiotics at this time as low suspicion for ongoing bacterial infection    Type 2 diabetes mellitus with microalbuminuria, without long-term current use of insulin ([facility])  Lab Results   Component Value Date    HGBA1C 7.7 (A) 11/15/2024       Recent Labs     12/23/24  1315   POCGLU 371*       Blood Sugar Average: Last 72 hrs:  (P) 371    11/15 hemoglobin A1c 7.7  Significant nausea and vomiting occurring less than 1 week ago and type II diabetic  Elevated blood sugars on admission  Outpatient medications include metformin and nateglinide,  will hold outpatient medications in favor for insulin  Continue sliding scale insulin, may need bolus therapy and mealtime insulin  VBG does not show acidosis, need to continue to monitor nausea vomiting as patient has elevated ion gap with a beta hydroxybutyrate 4.34  Trend blood glucose  Hypertension associated with diabetes  (HCC)  Lab Results   Component Value Date    HGBA1C 7.7 (A) 11/15/2024       Recent Labs     12/23/24  1315   POCGLU 371*       Blood Sugar Average: Last 72 hrs:  (P) 371    Outpatient medications include lisinopril, will hold in setting of acute renal failure  Blood pressure currently normotensive, monitor closely  Metabolic alkalosis  Mild metabolic alkalosis with pH 7.437, pCO2 37, bicarb 24.4  Likely in setting of gastrointestinal hydrogen loss from significant nausea/vomiting for several days with poor p.o. intake  Obtain ABG  Obtain lactic acid  Start IVF  Anion gap 37, trend  Hyponatremia  Sodium 124 arrival  Obtain urine lytes  Nephrology consulted  Trend BMP      VTE Pharmacologic Prophylaxis: VTE Score: 3 Moderate Risk (Score 3-4) - Pharmacological DVT Prophylaxis Ordered: heparin.  Code Status: No Order   Discussion with family: Updated  (daughter) at bedside.    Anticipated Length of Stay: Patient will be admitted on an inpatient basis with an anticipated length of stay of greater than 2 midnights secondary to acute renal failure.    History of Present Illness   Chief Complaint: Nausea/vomiting    Tanisha Morrow is a 84 y.o. female with a PMH of DM, HTN, HLD who presents with generalized malaise. Patient initially presented after having significant nausea and vomiting 12/13 to 12/15.  The patient continued to have poor oral intake and general malaise.  She continued to have lack of improvement the patient ultimately decided to be seen in the ED.     Review of Systems   Constitutional:  Positive for appetite change and fatigue. Negative for chills, diaphoresis and  fever.   HENT:  Negative for ear pain, postnasal drip, rhinorrhea, sinus pain and sore throat.    Eyes:  Negative for pain and visual disturbance.   Respiratory:  Negative for cough, chest tightness, shortness of breath and wheezing.    Cardiovascular:  Negative for chest pain and palpitations.   Gastrointestinal:  Positive for diarrhea, nausea and vomiting. Negative for abdominal pain.   Endocrine: Negative for polyuria.   Genitourinary:  Negative for decreased urine volume, difficulty urinating, dysuria, frequency, hematuria and urgency.   Musculoskeletal:  Negative for arthralgias and back pain.   Skin:  Negative for color change, pallor, rash and wound.   Neurological:  Positive for weakness, light-headedness and headaches. Negative for seizures and syncope.   Psychiatric/Behavioral:  Negative for confusion.    All other systems reviewed and are negative.      Historical Information   Past Medical History:   Diagnosis Date    Diabetes mellitus (HCC)     Hypertension     Osteoporosis     pt denies on PT eval 20     Past Surgical History:   Procedure Laterality Date     SECTION       Social History     Tobacco Use    Smoking status: Never    Smokeless tobacco: Never   Vaping Use    Vaping status: Never Used   Substance and Sexual Activity    Alcohol use: Never    Drug use: Never    Sexual activity: Not Currently     E-Cigarette/Vaping    E-Cigarette Use Never User      E-Cigarette/Vaping Substances    Nicotine No     THC No     CBD No     Flavoring No     Other No     Unknown No      Family history non-contributory  Social History:  Marital Status: Unknown   Occupation: N/A  Patient Pre-hospital Living Situation: Home  Patient Pre-hospital Level of Mobility: walks  Patient Pre-hospital Diet Restrictions: Diabetic    Meds/Allergies   I have reviewed home medications with patient personally.  Prior to Admission medications    Medication Sig Start Date End Date Taking? Authorizing Provider   Alcohol  Swabs (Pharmacist Choice Alcohol) PADS Use as directed 7/8/20   Historical Provider, MD   allopurinol (ZYLOPRIM) 100 mg tablet TAKE 2 TABLETS BY MOUTH DAILY 11/6/24   Raquel MinUNM [facility], DO   aspirin 81 mg chewable tablet Chew 81 mg daily    Historical Provider, MD   B Complex Vitamins (VITAMIN B COMPLEX PO) Take by mouth    Historical Provider, MD   Cholecalciferol (Vitamin D3) 50 MCG (2000 UT) capsule TAKE ONE (1) CAPSULE BY MOUTH DAILY 10/18/24   [facility], DO   colchicine (COLCRYS) 0.6 mg tablet TAKE 2 TABLETS BY MOUTH, THEN 1 HOUR LATER TAKE 1 TABLET BY MOUTH AS NEEDED FOR ACUTE GOUT FLARE 10/18/24   [facility], DO   ferrous sulfate 325 (65 FE) MG EC tablet TAKE 1 TABLET (324 MG TOTAL) BY MOUTH DAILY BEFORE BREAKFAST 12/11/24   [facility], DO   glucose blood test strip MEDICARE B 4/12/21   Historical Provider, MD   Lancet Devices (Adjustable Lancing Device) MISC MEDICARE B 6/5/23   [facility], DO   ProvenanceCAN FINEPOINT LANCETS MISC by Does not apply route daily 6/9/20   VERONIQUE Thomas   lisinopril (ZESTRIL) 20 mg tablet TAKE 1 TABLET (20 MG TOTAL) BY MOUTH DAILY 11/6/24   [facility], DO   metFORMIN (GLUCOPHAGE) 1000 MG tablet TAKE 1 TABLET (1,000 MG TOTAL) BY MOUTH 2 (TWO) TIMES A DAY WITH MEALS 7/31/24   [facility], DO   Misc Natural Products (Osteo Bi-Flex/5-Loxin Advanced) TABS Take by mouth    Historical Provider, MD   nateglinide (STARLIX) 60 mg tablet TAKE 1 TABLET (60 MG TOTAL) BY MOUTH 2 (TWO) TIMES A DAY WITH MEALS 12/19/24   [facility], DO   OneTouch Verio test strip USE TO TEST BLOOD SUGAR DAILY AS DIRECTED 3/31/22   Louis Khoury PA-C     No Known Allergies    Objective :  Temp:  [98.1 °F (36.7 °C)] 98.1 °F (36.7 °C)  HR:  [] 66  BP: (109-126)/(57-60) 126/60  Resp:  [18] 18  SpO2:  [98 %-99 %] 98 %  O2 Device: None (Room air)    Physical Exam  Vitals and nursing note reviewed.   Constitutional:       General: She is not in acute distress.     Appearance: She  is normal weight. She is not ill-appearing, toxic-appearing or diaphoretic.   HENT:      Head: Normocephalic.      Nose: Nose normal.      Mouth/Throat:      Mouth: Mucous membranes are dry.      Pharynx: Oropharynx is clear.   Eyes:      General: No scleral icterus.     Conjunctiva/sclera: Conjunctivae normal.      Pupils: Pupils are equal, round, and reactive to light.   Cardiovascular:      Rate and Rhythm: Normal rate and regular rhythm.      Heart sounds: No murmur heard.     No friction rub. No gallop.   Pulmonary:      Effort: Pulmonary effort is normal. No respiratory distress.      Breath sounds: Normal breath sounds. No stridor. No wheezing, rhonchi or rales.   Abdominal:      General: Abdomen is flat.      Palpations: Abdomen is soft.   Musculoskeletal:         General: Normal range of motion.      Cervical back: Normal range of motion and neck supple.      Right lower leg: No edema.      Left lower leg: No edema.   Lymphadenopathy:      Cervical: No cervical adenopathy.   Skin:     General: Skin is warm.      Coloration: Skin is not jaundiced or pale.      Findings: No bruising, erythema or lesion.   Neurological:      General: No focal deficit present.      Mental Status: She is alert and oriented to person, place, and time. Mental status is at baseline.      Cranial Nerves: No cranial nerve deficit.      Motor: No weakness.   Psychiatric:         Mood and Affect: Mood normal.         Behavior: Behavior normal.         Thought Content: Thought content normal.          Lines/Drains:            Lab Results: I have reviewed the following results:  Results from last 7 days   Lab Units 12/23/24  1333   WBC Thousand/uL 15.10*   HEMOGLOBIN g/dL 12.1   HEMATOCRIT % 37.1   PLATELETS Thousands/uL 604*   SEGS PCT % 86*   LYMPHO PCT % 9*   MONO PCT % 5   EOS PCT % 0     Results from last 7 days   Lab Units 12/23/24  1333   SODIUM mmol/L 124*   POTASSIUM mmol/L 5.5*   CHLORIDE mmol/L 63*   CO2 mmol/L 24   BUN mg/dL  205*   CREATININE mg/dL 8.34*   ANION GAP mmol/L 37*   CALCIUM mg/dL 8.8   GLUCOSE RANDOM mg/dL 379*         Results from last 7 days   Lab Units 12/23/24  1315   POC GLUCOSE mg/dl 371*     Lab Results   Component Value Date    HGBA1C 7.7 (A) 11/15/2024    HGBA1C 8.2 (H) 07/13/2024    HGBA1C 7.5 (A) 03/25/2024           Imaging Results Review: No pertinent imaging studies reviewed.  Other Study Results Review: EKG was reviewed.     Administrative Statements   I have spent a total time of 75 minutes in caring for this patient on the day of the visit/encounter including Diagnostic results, Prognosis, Documenting in the medical record, Reviewing / ordering tests, medicine, procedures  , Obtaining or reviewing history  , and Communicating with other healthcare professionals .    ** Please Note: This note has been constructed using a voice recognition system. **

## 2024-12-23 NOTE — ASSESSMENT & PLAN NOTE
Patient initially presented after having significant nausea and vomiting 12/13 to 12/15.  The patient continued to have poor oral intake and general malaise.  She continued to have lack of improvement the patient ultimately decided to be seen in the ED.    Creatinine on arrival 8.34 with , baseline creatinine 0.6-0.9  Sodium 125, potassium 5.5  Nephrology consulted  Obtain PVR/bladder scan  Start IVF  Dyer catheter for strict I's/O's  Trend BMP every 6 hours  May ultimately need dialysis, however we will first trial IVF  Obtain echocardiogram to determine aggressiveness of IVF  Obtain urine chloride, urine creatinine, urine osmole, PTH, urine sodium  Hold nephrotoxins which includes patient's home medications of metformin and lisinopril  Obtain lactic acid  CT abdomen pelvis pending  Appears clinically dry on exam

## 2024-12-23 NOTE — QUICK NOTE
I have seen and examined the patient personally and have reviewed the medical record independently.  I have reviewed the case with the advanced practitioner including all assessments and the plan of care.  I agree with the advanced practitioner with addition of:    The patient is an 84-year-old female who presents of acute renal failure.  Patient endorses episodes of nausea and vomiting earlier in the month.  She has had progressively weakness and fatigue.      Was feeling overall unwell and decided to come to the ED.  At the ED was noted to be in renal failure with a creatinine of 8, BUN of 200, potassium 5.5, sodium 125.      Physical Exam:  General: Awake, alert and oriented. Appears stated age.   Head: Normocephalic, atraumatic  Neck: Supple, no masses  Cardiac: Normal S1, S2, no S3, no JVD  Respiratory: Normal work of breathing, normal chest wall expansion, breathing sounds present bilateraly with no crackles rhonchi or wheezing  Abdominal: Soft nontender nondistended  Extremities: No edema  Skin: Dry    Assessment:    Acute kidney injury  SIRS  Abdominal pain with nausea and vomiting  Hyponatremia  Hyperkalemia  Type 2 diabetes  Hypertension  Metabolic alkalosis      Plan:      Given degree of kidney function will consult nephrology  Follow CT of the abdomen without contrast to rule out obstruction  Dyer catheter insertion  Check urine studies, albumin creatinine ratio  Monitor electrolytes closely particularly potassium bicarb  IV fluids  Hold antihypertensive/ACE  Analgesia, follow CT results and monitor abdominal exam  Monitor urinary output  Monitor closely for any uremic symptoms          Shekhar Stockton MD  Internal Medicine - [facility]

## 2024-12-23 NOTE — ASSESSMENT & PLAN NOTE
Lab Results   Component Value Date    HGBA1C 7.7 (A) 11/15/2024       Recent Labs     12/23/24  1315   POCGLU 371*       Blood Sugar Average: Last 72 hrs:  (P) 371    11/15 hemoglobin A1c 7.7  Significant nausea and vomiting occurring less than 1 week ago and type II diabetic  Elevated blood sugars on admission  Outpatient medications include metformin and nateglinide, will hold outpatient medications in favor for insulin  Continue sliding scale insulin, may need bolus therapy and mealtime insulin  VBG does not show acidosis, need to continue to monitor nausea vomiting as patient has elevated ion gap with a beta hydroxybutyrate 4.34  Trend blood glucose

## 2024-12-24 PROBLEM — K56.609 SMALL BOWEL OBSTRUCTION (HCC): Status: ACTIVE | Noted: 2024-01-01

## 2024-12-24 NOTE — PLAN OF CARE
Problem: PAIN - ADULT  Goal: Verbalizes/displays adequate comfort level or baseline comfort level  Description: Interventions:  - Encourage patient to monitor pain and request assistance  - Assess pain using appropriate pain scale  - Administer analgesics based on type and severity of pain and evaluate response  - Implement non-pharmacological measures as appropriate and evaluate response  - Consider cultural and social influences on pain and pain management  - Notify physician/advanced practitioner if interventions unsuccessful or patient reports new pain  Outcome: Progressing     Problem: INFECTION - ADULT  Goal: Absence or prevention of progression during hospitalization  Description: INTERVENTIONS:  - Assess and monitor for signs and symptoms of infection  - Monitor lab/diagnostic results  - Monitor all insertion sites, i.e. indwelling lines, tubes, and drains  - Monitor endotracheal if appropriate and nasal secretions for changes in amount and color  - [place] appropriate cooling/warming therapies per order  - Administer medications as ordered  - Instruct and encourage patient and family to use good hand hygiene technique  - Identify and instruct in appropriate isolation precautions for identified infection/condition  Outcome: Progressing     Problem: SAFETY ADULT  Goal: Patient will remain free of falls  Description: INTERVENTIONS:  - Educate patient/family on patient safety including physical limitations  - Instruct patient to call for assistance with activity   - Consult OT/PT to assist with strengthening/mobility   - Keep Call bell within reach  - Keep bed low and locked with side rails adjusted as appropriate  - Keep care items and personal belongings within reach  - Initiate and maintain comfort rounds  - Make Fall Risk Sign visible to staff  - Offer Toileting every 2 Hours, in advance of need  - Initiate/Maintain bed alarm  - Obtain necessary fall risk management equipment:    - Apply yellow socks  and bracelet for high fall risk patients  - Consider moving patient to room near nurses station  Outcome: Progressing     Problem: Nutrition/Hydration-ADULT  Goal: Nutrient/Hydration intake appropriate for improving, restoring or maintaining nutritional needs  Description: Monitor and assess patient's nutrition/hydration status for malnutrition. Collaborate with interdisciplinary team and initiate plan and interventions as ordered.  Monitor patient's weight and dietary intake as ordered or per policy. Utilize nutrition screening tool and intervene as necessary. Determine patient's food preferences and provide high-protein, high-caloric foods as appropriate.     INTERVENTIONS:  - Monitor oral intake, urinary output, labs, and treatment plans  - Assess nutrition and hydration status and recommend course of action  - Evaluate amount of meals eaten  - Assist patient with eating if necessary   - Allow adequate time for meals  - Recommend/ encourage appropriate diets, oral nutritional supplements, and vitamin/mineral supplements  - Order, calculate, and assess calorie counts as needed  - Recommend, monitor, and adjust tube feedings and TPN/PPN based on assessed needs  - Assess need for intravenous fluids  - Provide specific nutrition/hydration education as appropriate  - Include patient/family/caregiver in decisions related to nutrition  Outcome: Progressing     Problem: MOBILITY - ADULT  Goal: Maintain or return to baseline ADL function  Description: INTERVENTIONS:  -  Assess patient's ability to carry out ADLs; assess patient's baseline for ADL function and identify physical deficits which impact ability to perform ADLs (bathing, care of mouth/teeth, toileting, grooming, dressing, etc.)  - Assess/evaluate cause of self-care deficits   - Assess range of motion  - Assess patient's mobility; develop plan if impaired  - Assess patient's need for assistive devices and provide as appropriate  - Encourage maximum independence  but intervene and supervise when necessary  - Involve family in performance of ADLs  - Assess for home care needs following discharge   - Consider OT consult to assist with ADL evaluation and planning for discharge  - Provide patient education as appropriate  Outcome: Progressing

## 2024-12-24 NOTE — ASSESSMENT & PLAN NOTE
Mild metabolic alkalosis with pH 7.437, pCO2 37, bicarb 24.4  Likely in setting of gastrointestinal hydrogen loss from significant nausea/vomiting for several days with poor p.o. intake  ABG showed resolution of the mild metabolic alkalosis and improving CO2  Lactic acid 0.6  Continue with IVF  Anion gap initially 37, down trended to 25, continue to trend

## 2024-12-24 NOTE — CONSULTS
Consultation - Surgery-General   Name: Tanisha Morrow 84 y.o. female I MRN: [id number]  Unit/Bed#: -01 I Date of Admission: 12/23/2024   Date of Service: 12/24/2024 I Hospital Day: 1   Inpatient consult to Acute Care Surgery  Consult performed by: Gianna Cooper PA-C  Consult ordered by: Rip Swan PA-C        Physician Requesting Evaluation: Yao Mars MD   Reason for Evaluation / Principal Problem: SBO vs Ileus     Assessment & Plan  Abnormal CT of the abdomen  SBO vs Ileus. Pt is 83 y/o with nausea and vomiting for 10 days.     CT 12/23- Small bowel obstruction with transition point in the upper abdomen where there is an indeterminate partially calcified intraluminal small bowel lesion. Gallstone ileus in the differential but unclear when gallbladder was removed.     WBC 11.56 (15.10)    Plan:  NPO  I/Os   F/u lactate acid  Obstruction series today   Possible NG tube insertion      History of Present Illness   Tanisha Morrow is a 84 y.o. female with a PMH of DM, HTN, and HLD who presents with nausea and vomiting. Patient reports that the nausea and vomiting was worse one week ago, but she continues to have episodes of vomiting. She does not remember when her last bowel movement was or if she has passed any flatus recently. She reports not being able to tolerate a diet or any fluids for one week. She has now is fatigued, weak, and lightheaded. She denies any fever, chills, and any urinary symptoms.     Review of Systems   Constitutional:  Positive for appetite change and fatigue.   HENT: Negative.     Eyes: Negative.    Respiratory: Negative.     Cardiovascular: Negative.    Gastrointestinal:  Positive for abdominal pain, nausea and vomiting.   Endocrine: Negative.    Genitourinary: Negative.    Musculoskeletal:  Positive for myalgias.   Skin: Negative.    Neurological:  Positive for dizziness, weakness and light-headedness.   Hematological: Negative.    Psychiatric/Behavioral:  Positive for  confusion.      I have reviewed the patient's PMH, PSH, Social History, Family History, Meds, and Allergies  Historical Information   Past Medical History:   Diagnosis Date    Diabetes mellitus (HCC)     Hypertension     Osteoporosis     pt denies on PT eval 20     Past Surgical History:   Procedure Laterality Date     SECTION       Social History     Tobacco Use    Smoking status: Never    Smokeless tobacco: Never   Vaping Use    Vaping status: Never Used   Substance and Sexual Activity    Alcohol use: Never    Drug use: Never    Sexual activity: Not Currently     E-Cigarette/Vaping    E-Cigarette Use Never User      E-Cigarette/Vaping Substances    Nicotine No     THC No     CBD No     Flavoring No     Other No     Unknown No      Family History   Problem Relation Age of Onset    Diabetes Mother     COPD Mother     Substance Abuse Mother      Social History     Tobacco Use    Smoking status: Never    Smokeless tobacco: Never   Vaping Use    Vaping status: Never Used   Substance and Sexual Activity    Alcohol use: Never    Drug use: Never    Sexual activity: Not Currently     Patient has no known allergies.    Objective :  Temp:  [97.5 °F (36.4 °C)-99.5 °F (37.5 °C)] 97.9 °F (36.6 °C)  HR:  [] 97  BP: (100-137)/(49-61) 100/49  Resp:  [15-18] 15  SpO2:  [94 %-100 %] 94 %  O2 Device: None (Room air)    Lines/Drains/Airways       Active Status       Name Placement date Placement time Site Days    Urethral Catheter Double-lumen 16 Fr. 24  0019  Double-lumen  less than 1                  Physical Exam  Constitutional:       Appearance: Normal appearance.   HENT:      Head: Normocephalic and atraumatic.      Mouth/Throat:      Mouth: Mucous membranes are dry.   Eyes:      Extraocular Movements: Extraocular movements intact.      Pupils: Pupils are equal, round, and reactive to light.   Cardiovascular:      Rate and Rhythm: Normal rate and regular rhythm.   Pulmonary:      Effort: Pulmonary effort  is normal.   Abdominal:      General: There is distension.      Palpations: Abdomen is soft.      Tenderness: There is abdominal tenderness. There is no guarding or rebound.   Musculoskeletal:         General: Normal range of motion.      Cervical back: Normal range of motion and neck supple.   Skin:     General: Skin is warm and dry.   Neurological:      General: No focal deficit present.      Mental Status: She is alert.      Motor: Weakness present.         Lab Results: I have reviewed the following results:  Recent Labs     12/23/24  1333 12/23/24  1700 12/24/24  0536   WBC 15.10*  --  11.56*   HGB 12.1  --  9.5*   HCT 37.1  --  30.3*   *   < > 348   SODIUM 124*   < > 130*   K 5.5*   < > 4.7   CL 63*   < > 82*   CO2 24   < > 23   *   < > 199*   CREATININE 8.34*   < > 6.97*   GLUC 379*   < > 138   MG 2.2  --   --    PHOS  --   --  12.6*   AST 10*  --   --    ALT 12  --   --    ALB 3.6  --   --    TBILI 0.58  --   --    ALKPHOS 82  --   --     < > = values in this interval not displayed.         VTE Pharmacologic Prophylaxis: Heparin  VTE Mechanical Prophylaxis: sequential compression device    Administrative Statements   I have spent a total time of 30 minutes in caring for this patient on the day of the visit/encounter including Diagnostic results and Obtaining or reviewing history  .

## 2024-12-24 NOTE — PROGRESS NOTES
Progress Note - Hospitalist   Name: Tanisha Morrow 84 y.o. female I MRN: [id number]  Unit/Bed#: -01 I Date of Admission: 12/23/2024   Date of Service: 12/24/2024 I Hospital Day: 1    Assessment & Plan  Acute kidney failure (HCC)  Patient initially presented after having significant nausea and vomiting 12/13 to 12/15.  The patient continued to have poor oral intake and general malaise.  She continued to have lack of improvement the patient ultimately decided to be seen in the ED.    Creatinine on arrival 8.34 with , baseline creatinine 0.6-0.9  Sodium 125, potassium 5.5 on admission  Nephrology consulted  Continue with IVF  Dyer catheter for strict I's/O's  Trend BMP every 6 hours  As creatinine improved, low suspicion to ultimately need dialysis, however still on threshold  Creatinine improved to 6.97 BUN minimal improvement to 199  Echocardiogram showed LVEF 55% with grade 1 diastolic dysfunction, okay to continue with aggressive IVF  Urine creatinine 107.3, urine chloride less than 15, phosphorus 12.6, uric acid 13.9  Hold nephrotoxins which includes patient's home medications of metformin and lisinopril  CT abdomen pelvis- Small bowel obstruction with transition point in the upper abdomen where there is an indeterminate partially calcified intraluminal small bowel lesion   Appears clinically dry on exam  SIRS (systemic inflammatory response syndrome) (HCC)  Met SIRS criteria with heart rate 100 and leukocytosis 15.1, likely in setting of significant dehydration, no known acute source of infection  Lactic acid 0.6  CT abdomen pelvis- Small bowel obstruction with transition point in the upper abdomen where there is an indeterminate partially calcified intraluminal small bowel lesion   Continue to rule out versus in sepsis  Trend WBC (improved to 11.5 K)  Received fluid bolus therapy in ED, will defer antibiotics at this time as low suspicion for ongoing bacterial infection    Type 2 diabetes mellitus  with microalbuminuria, without long-term current use of insulin ([facility])  Lab Results   Component Value Date    HGBA1C 7.7 (A) 11/15/2024       Recent Labs     12/23/24 2054 12/24/24  0732 12/24/24  1120 12/24/24  1547   POCGLU 178* 140 140 109       Blood Sugar Average: Last 72 hrs:  (P) 186    11/15 hemoglobin A1c 7.7  Significant nausea and vomiting occurring less than 1 week ago and type II diabetic  Elevated blood sugars on admission  Outpatient medications include metformin and nateglinide, will hold outpatient medications in favor for insulin  Continue sliding scale insulin, may need bolus therapy and mealtime insulin  VBG does not show acidosis, need to continue to monitor nausea vomiting as patient has elevated ion gap with a beta hydroxybutyrate 4.34  Trend blood glucose  Hypertension associated with diabetes  ([facility])  Lab Results   Component Value Date    HGBA1C 7.7 (A) 11/15/2024       Recent Labs     12/23/24 2054 12/24/24  0732 12/24/24  1120 12/24/24  1547   POCGLU 178* 140 140 109       Blood Sugar Average: Last 72 hrs:  (P) 186    Outpatient medications include lisinopril, will hold in setting of acute renal failure  Blood pressure currently normotensive, monitor closely  Metabolic alkalosis  Mild metabolic alkalosis with pH 7.437, pCO2 37, bicarb 24.4  Likely in setting of gastrointestinal hydrogen loss from significant nausea/vomiting for several days with poor p.o. intake  ABG showed resolution of the mild metabolic alkalosis and improving CO2  Lactic acid 0.6  Continue with IVF  Anion gap initially 37, down trended to 25, continue to trend  Hyponatremia  Sodium 124 arrival, improved to 130  Urine lites obtained  Nephrology consulted  Trend BMP  Small bowel obstruction (HCC)  CT abdomen pelvis obtained due to significant nausea and abdominal pain  CT abdomen showed- Small bowel obstruction with transition point in the upper abdomen where there is an indeterminate partially calcified intraluminal  small bowel lesion   Surgery consulted  N.p.o.  IVF  Obtain obstruction series-nonobstructing gas pattern  Obtain right upper quadrant ultrasound    VTE Pharmacologic Prophylaxis: VTE Score: 3 Moderate Risk (Score 3-4) - Pharmacological DVT Prophylaxis Ordered: heparin.    Mobility:   Basic Mobility Inpatient Raw Score: 15  JH-HLM Goal: 4: Move to chair/commode  JH-HLM Achieved: 4: Move to chair/commode  JH-HLM Goal achieved. Continue to encourage appropriate mobility.    Patient Centered Rounds: I performed bedside rounds with nursing staff today.   Discussions with Specialists or Other Care Team Provider: RYANNE surgery, nephro    Education and Discussions with Family / Patient: Updated  (niece) at bedside.    Current Length of Stay: 1 day(s)  Current Patient Status: Inpatient   Certification Statement: The patient will continue to require additional inpatient hospital stay due to continued acute renal failure treatment with IVF and continued workup for abdominal dysfunction  Discharge Plan: Anticipate discharge in >72 hrs to discharge location to be determined pending rehab evaluations.    Code Status: Level 1 - Full Code    Subjective   Patient continues to report having abdominal pain and a mild headache.  She currently denies any chest pain/pressure, palpitations, lightheadedness, nausea, shortness breath, or chills.    Objective :  Temp:  [97.3 °F (36.3 °C)-99.5 °F (37.5 °C)] 97.6 °F (36.4 °C)  HR:  [] 72  BP: (100-137)/(47-61) 114/56  Resp:  [15-16] 15  SpO2:  [87 %-100 %] 87 %  O2 Device: None (Room air)    Body mass index is 23.46 kg/m².     Input and Output Summary (last 24 hours):     Intake/Output Summary (Last 24 hours) at 12/24/2024 1605  Last data filed at 12/24/2024 1135  Gross per 24 hour   Intake --   Output 1695 ml   Net -1695 ml       Physical Exam  Vitals and nursing note reviewed.   Constitutional:       General: She is not in acute distress.     Appearance: She is normal  weight. She is not ill-appearing, toxic-appearing or diaphoretic.   HENT:      Head: Normocephalic.      Nose: Nose normal.      Mouth/Throat:      Mouth: Mucous membranes are moist.      Pharynx: Oropharynx is clear.   Eyes:      General: No scleral icterus.     Conjunctiva/sclera: Conjunctivae normal.      Pupils: Pupils are equal, round, and reactive to light.   Cardiovascular:      Rate and Rhythm: Normal rate and regular rhythm.      Heart sounds: No murmur heard.     No friction rub. No gallop.   Pulmonary:      Effort: Pulmonary effort is normal. No respiratory distress.      Breath sounds: Normal breath sounds. No stridor. No wheezing, rhonchi or rales.   Abdominal:      General: Abdomen is flat.      Palpations: Abdomen is soft.   Musculoskeletal:         General: Normal range of motion.      Cervical back: Normal range of motion and neck supple.      Right lower leg: No edema.      Left lower leg: No edema.   Lymphadenopathy:      Cervical: No cervical adenopathy.   Skin:     General: Skin is warm.      Coloration: Skin is not jaundiced or pale.      Findings: No bruising, erythema or lesion.   Neurological:      General: No focal deficit present.      Mental Status: She is alert and oriented to person, place, and time. Mental status is at baseline.      Cranial Nerves: No cranial nerve deficit.      Motor: No weakness.   Psychiatric:         Mood and Affect: Mood normal.         Behavior: Behavior normal.         Thought Content: Thought content normal.           Lines/Drains:  Lines/Drains/Airways       Active Status       Name Placement date Placement time Site Days    Urethral Catheter Double-lumen 16 Fr. 12/24/24  0019  Double-lumen  less than 1                  Urinary Catheter:  Goal for removal: Remove after 48 hrs of I/O monitoring                 Lab Results: I have reviewed the following results:   Results from last 7 days   Lab Units 12/24/24  0536   WBC Thousand/uL 11.56*   HEMOGLOBIN g/dL  9.5*   HEMATOCRIT % 30.3*   PLATELETS Thousands/uL 348   SEGS PCT % 84*   LYMPHO PCT % 11*   MONO PCT % 5   EOS PCT % 0     Results from last 7 days   Lab Units 12/24/24  1135 12/23/24  1700 12/23/24  1333   SODIUM mmol/L 133*   < > 124*   POTASSIUM mmol/L 3.9   < > 5.5*   CHLORIDE mmol/L 86*   < > 63*   CO2 mmol/L 21   < > 24   BUN mg/dL 194*   < > 205*   CREATININE mg/dL 6.31*   < > 8.34*   ANION GAP mmol/L 26*   < > 37*   CALCIUM mg/dL 8.2*   < > 8.8   ALBUMIN g/dL  --   --  3.6   TOTAL BILIRUBIN mg/dL  --   --  0.58   ALK PHOS U/L  --   --  82   ALT U/L  --   --  12   AST U/L  --   --  10*   GLUCOSE RANDOM mg/dL 134   < > 379*    < > = values in this interval not displayed.         Results from last 7 days   Lab Units 12/24/24  1547 12/24/24  1120 12/24/24  0732 12/23/24  2054 12/23/24  1708 12/23/24  1315   POC GLUCOSE mg/dl 109 140 140 178* 178* 371*         Results from last 7 days   Lab Units 12/24/24  1135   LACTIC ACID mmol/L 0.6       Recent Cultures (last 7 days):         Imaging Results Review: I reviewed radiology reports from this admission including: CT abdomen/pelvis and xray(s).  Other Study Results Review: No additional pertinent studies reviewed.    Last 24 Hours Medication List:     Current Facility-Administered Medications:     acetaminophen (TYLENOL) tablet 650 mg, Q6H PRN    [Held by provider] aspirin chewable tablet 81 mg, Daily    heparin (porcine) subcutaneous injection 5,000 Units, Q8H SHARON **AND** [COMPLETED] Platelet count, Once    insulin lispro (HumALOG/ADMELOG) 100 units/mL subcutaneous injection 1-5 Units, TID AC **AND** Fingerstick Glucose (POCT), TID AC    oxyCODONE (ROXICODONE) split tablet 2.5 mg, Q6H PRN    sodium chloride 0.9 % infusion, Continuous, Last Rate: 150 mL/hr (12/24/24 1211)    Administrative Statements   Today, Patient Was Seen By: Rip Swan PA-C  I have spent a total time of 75 minutes in caring for this patient on the day of the visit/encounter including  Documenting in the medical record, Reviewing / ordering tests, medicine, procedures  , Obtaining or reviewing history  , and Communicating with other healthcare professionals .    **Please Note: This note may have been constructed using a voice recognition system.**

## 2024-12-24 NOTE — ASSESSMENT & PLAN NOTE
Lab Results   Component Value Date    HGBA1C 7.7 (A) 11/15/2024   Being managed by Slim is slowly improving    Recent Labs     12/23/24  1708 12/23/24 2054 12/24/24  0732 12/24/24  1120   POCGLU 178* 178* 140 140       Blood Sugar Average: Last 72 hrs:  (P) 201.4

## 2024-12-24 NOTE — ASSESSMENT & PLAN NOTE
Lab Results   Component Value Date    HGBA1C 7.7 (A) 11/15/2024       Recent Labs     12/23/24  2054 12/24/24  0732 12/24/24  1120 12/24/24  1547   POCGLU 178* 140 140 109       Blood Sugar Average: Last 72 hrs:  (P) 186    11/15 hemoglobin A1c 7.7  Significant nausea and vomiting occurring less than 1 week ago and type II diabetic  Elevated blood sugars on admission  Outpatient medications include metformin and nateglinide, will hold outpatient medications in favor for insulin  Continue sliding scale insulin, may need bolus therapy and mealtime insulin  VBG does not show acidosis, need to continue to monitor nausea vomiting as patient has elevated ion gap with a beta hydroxybutyrate 4.34  Trend blood glucose

## 2024-12-24 NOTE — ASSESSMENT & PLAN NOTE
Improving kidney function with IV fluid which I will continue.  Likely etiology is volume depletion due to diarrhea vomiting and not eating.  No need for dialysis for now.  Will monitor closely

## 2024-12-24 NOTE — ASSESSMENT & PLAN NOTE
Met SIRS criteria with heart rate 100 and leukocytosis 15.1, likely in setting of significant dehydration, no known acute source of infection  Lactic acid 0.6  CT abdomen pelvis- Small bowel obstruction with transition point in the upper abdomen where there is an indeterminate partially calcified intraluminal small bowel lesion   Continue to rule out versus in sepsis  Trend WBC (improved to 11.5 K)  Received fluid bolus therapy in ED, will defer antibiotics at this time as low suspicion for ongoing bacterial infection

## 2024-12-24 NOTE — CASE MANAGEMENT
Case Management Discharge Planning Note    Patient name Tanisha Morrow  Location /-01 MRN [id number]  : 1940 Date 2024       Current Admission Date: 2024  Current Admission Diagnosis:Acute kidney failure (HCC)   Patient Active Problem List    Diagnosis Date Noted Date Diagnosed    SIRS (systemic inflammatory response syndrome) (HCC) 2024     Acute kidney failure (HCC) 2024     Metabolic alkalosis 2024     Hyponatremia 2024     Small bowel obstruction (HCC) 2024     Wears dentures 11/15/2024     Iron deficiency 2021     Chronic neck pain 09/10/2020     Hypercalcemia 09/10/2020     Hyperlipidemia associated with type 2 diabetes mellitus  (HCC) 2020     Gout 2020     Torticollis 2020     Type 2 diabetes mellitus with microalbuminuria, without long-term current use of insulin (HCC) 2020     Hypertension associated with diabetes  (HCC) 2020       LOS (days): 1  Geometric Mean LOS (GMLOS) (days): 3  Days to GMLOS:1.9     OBJECTIVE:  Risk of Unplanned Readmission Score: 16.1         Current admission status: Inpatient   Preferred Pharmacy:   [facility] [place], [place] - [address]  [address]  [address]  [facility] [address]  Phone: [phone] Fax: [phone]    Primary Care Provider: Raquel Rocha DO    [facility]  Secondary Insurance:     DISCHARGE DETAILS:     CM met with roland at bedside who is agreeable with cm sending referrals for [facility]hc and rehab. CM will speak with family again once we see PT/OT recs.

## 2024-12-24 NOTE — ASSESSMENT & PLAN NOTE
Lab Results   Component Value Date    HGBA1C 7.7 (A) 11/15/2024   Reasonable control though will monitor closely    Recent Labs     12/23/24  1708 12/23/24  2054 12/24/24  0732 12/24/24  1120   POCGLU 178* 178* 140 140       Blood Sugar Average: Last 72 hrs:  (P) 201.4

## 2024-12-24 NOTE — PROGRESS NOTES
Progress Note - Nephrology   Name: Tanisha Morrow 84 y.o. female I MRN: [id number]  Unit/Bed#: -01 I Date of Admission: 12/23/2024   Date of Service: 12/24/2024 I Hospital Day: 1    Assessment & Plan  Acute kidney failure (HCC)  Improving kidney function with IV fluid which I will continue.  Likely etiology is volume depletion due to diarrhea vomiting and not eating.  No need for dialysis for now.  Will monitor closely  Type 2 diabetes mellitus with microalbuminuria, without long-term current use of insulin ([facility])  Lab Results   Component Value Date    HGBA1C 7.7 (A) 11/15/2024   Being managed by Slim is slowly improving    Recent Labs     12/23/24  1708 12/23/24 2054 12/24/24  0732 12/24/24  1120   POCGLU 178* 178* 140 140       Blood Sugar Average: Last 72 hrs:  (P) 201.4    Hypertension associated with diabetes  ([facility])  Lab Results   Component Value Date    HGBA1C 7.7 (A) 11/15/2024   Reasonable control though will monitor closely    Recent Labs     12/23/24  1708 12/23/24 2054 12/24/24  0732 12/24/24  1120   POCGLU 178* 178* 140 140       Blood Sugar Average: Last 72 hrs:  (P) 201.4    Metabolic alkalosis  Slowly improving  Hyponatremia  Likely related to volume.  Will monitor closely  Abnormal CT of the abdomen      I have reviewed the nephrology recommendations including continued need for IV fluid, with Slim, and we are in agreement with renal plan including the information outlined above.     Subjective   Brief History of Admission -acute kidney injury    Admitted with volume depletion due to diarrhea vomiting and inadequate intake    Started on IV fluid with improving urine output    Tolerating IV fluids well still appear dehydrated based on clinical examination we will continue provide IV fluid and monitor right now    Objective :  Temp:  [97.3 °F (36.3 °C)-99.5 °F (37.5 °C)] 97.3 °F (36.3 °C)  HR:  [] 101  BP: (100-137)/(47-61) 114/47  Resp:  [15-16] 15  SpO2:  [94 %-100 %] 96 %  O2 Device:  None (Room air)    Current Weight: Weight - Scale: 64 kg (141 lb)  First Weight: Weight - Scale: 64.3 kg (141 lb 12.1 oz)  I/O         12/22 0701  12/23 0700 12/23 0701  12/24 0700 12/24 0701  12/25 0700    Urine (mL/kg/hr)  1095     Total Output  1095     Net  -1095            Unmeasured Urine Occurrence  1 x           Physical Exam  Constitutional:       General: She is not in acute distress.     Appearance: She is well-developed. She is ill-appearing.   HENT:      Head: Normocephalic.      Mouth/Throat:      Mouth: Mucous membranes are dry.   Eyes:      General: No scleral icterus.     Conjunctiva/sclera: Conjunctivae normal.   Neck:      Vascular: No JVD.   Cardiovascular:      Rate and Rhythm: Normal rate.      Heart sounds: Normal heart sounds.   Pulmonary:      Effort: Pulmonary effort is normal.      Breath sounds: No wheezing.   Abdominal:      Palpations: Abdomen is soft.      Tenderness: There is no abdominal tenderness.   Musculoskeletal:         General: Normal range of motion.      Cervical back: Neck supple.   Skin:     General: Skin is warm.      Findings: No rash.   Neurological:      Mental Status: She is alert and oriented to person, place, and time.   Psychiatric:         Behavior: Behavior normal.         Medications:    Current Facility-Administered Medications:     acetaminophen (TYLENOL) tablet 650 mg, 650 mg, Oral, Q6H PRN, Rip Swan PA-C    [Held by provider] aspirin chewable tablet 81 mg, 81 mg, Oral, Daily, Rip Swan PA-C    heparin (porcine) subcutaneous injection 5,000 Units, 5,000 Units, Subcutaneous, Q8H SHARON, 5,000 Units at 12/24/24 0538 **AND** [COMPLETED] Platelet count, , , Once, Rip Swan PA-C    insulin lispro (HumALOG/ADMELOG) 100 units/mL subcutaneous injection 1-5 Units, 1-5 Units, Subcutaneous, TID AC, 1 Units at 12/23/24 1710 **AND** Fingerstick Glucose (POCT), , , TID AC, Rip Swan PA-C    oxyCODONE (ROXICODONE) split tablet 2.5 mg,  "2.5 mg, Oral, Q6H PRN, Rip Swan PA-C, 2.5 mg at 12/24/24 0634    sodium chloride 0.9 % infusion, 150 mL/hr, Intravenous, Continuous, Stan Steel MD, Last Rate: 150 mL/hr at 12/24/24 1211, 150 mL/hr at 12/24/24 1211      Lab Results: I have reviewed the following results:  Results from last 7 days   Lab Units 12/24/24  1135 12/24/24  0536 12/24/24  0000 12/23/24  2349 12/23/24  1700 12/23/24  1333   WBC Thousand/uL  --  11.56*  --   --   --  15.10*   HEMOGLOBIN g/dL  --  9.5*  --   --   --  12.1   HEMATOCRIT %  --  30.3*  --   --   --  37.1   PLATELETS Thousands/uL  --  348  --  398*  --  604*   POTASSIUM mmol/L 3.9 4.7 4.4  --  5.0 5.5*   CHLORIDE mmol/L 86* 82* 79*  --  77* 63*   CO2 mmol/L 21 23 23  --  23 24   BUN mg/dL 194* 199* 201*  --  194* 205*   CREATININE mg/dL 6.31* 6.97* 7.17*  --  7.40* 8.34*   CALCIUM mg/dL 8.2* 7.8* 7.8*  --  7.7* 8.8   MAGNESIUM mg/dL  --   --   --   --   --  2.2   PHOSPHORUS mg/dL  --  12.6*  --   --   --   --    ALBUMIN g/dL  --   --   --   --   --  3.6       Administrative Statements     Portions of the record may have been created with voice recognition software. Occasional wrong word or \"sound a like\" substitutions may have occurred due to the inherent limitations of voice recognition software. Read the chart carefully and recognize, using context, where substitutions have occurred.If you have any questions, please contact the dictating provider.  "

## 2024-12-24 NOTE — ASSESSMENT & PLAN NOTE
CT abdomen pelvis obtained due to significant nausea and abdominal pain  CT abdomen showed- Small bowel obstruction with transition point in the upper abdomen where there is an indeterminate partially calcified intraluminal small bowel lesion   Surgery consulted  N.p.o.  IVF  Obtain obstruction series-nonobstructing gas pattern  Obtain right upper quadrant ultrasound

## 2024-12-24 NOTE — UTILIZATION REVIEW
Initial Clinical Review    Admission: Date/Time/Statement:   Admission Orders (From admission, onward)       Ordered        12/23/24 1436  INPATIENT ADMISSION  Once                          Orders Placed This Encounter   Procedures    INPATIENT ADMISSION     Standing Status:   Standing     Number of Occurrences:   1     Level of Care:   Med Surg [16]     Estimated length of stay:   More than 2 Midnights     Certification:   I certify that inpatient services are medically necessary for this patient for a duration of greater than two midnights. See H&P and MD Progress Notes for additional information about the patient's course of treatment.     ED Arrival Information       Expected   -    Arrival   12/23/2024 12:23    Acuity   Urgent              Means of arrival   Walk-In    Escorted by   Family Member    Service   Hospitalist    Admission type   Emergency              Arrival complaint   Weakness             Chief Complaint   Patient presents with    Weakness - Generalized     Pt brought in by niece for generalized weakness for the past few days, pt has had decreased appetite due to nausea/vomiting that started on Monday through to Wednesday. Nausea/vomiting has now resolved, pt can tolerate PO fluids but has decreased appetite, and has not taken any medications since Wednesday.        Initial Presentation: 84 y.o. female to the ED From home with complaints of generalized weakness, decreased appetite.  Admitted to inpatient for bharat, SIRS.  H/O DM, HTN, HLD. On arrival, creat 8.34, sodium 124.BHB 4.34wbc 15.10. In the ED, given 3 liters iv fluids, IV insulin.  Check ECHO.  HOld nephrotoxins.  Appears clinically dry.  NEphrology consult. Mild metabolic alkalosis with pH 7.437, pCO2 37, bicarb 24.4 .    Nephrology Consult:  bharat, appears dehydrated, vomiting.  Appears alkalotic.  COnitnue iv fluids.  IF renal function does not improve, will need dialysis.     Anticipated Length of Stay/Certification Statement:  Patient  will be admitted on an inpatient basis with an anticipated length of stay of greater than 2 midnights secondary to acute renal failure.     Date: 12/24   Day 2:  Creat improving.  Continue IV fluids. Newly found SBO.  Being followed by GEn/surg.  Keep NPO.  May need ngtube.       Gen/surg: CT 12/23- Small bowel obstruction with transition point in the upper abdomen where there is an indeterminate partially calcified intraluminal small bowel lesion. Gallstone ileus in the differential but unclear when gallbladder was removed.  Keep NPO, IV fluids.  Check obstruction series. Has abdominal distension, tenderness, weakness.     Nephrology:  bharat improving.  Was likely due to volume depletion. Continue to monitor closely.  Tolerating iV fluids.  Continue.   ED Treatment-Medication Administration from 12/23/2024 1223 to 12/23/2024 1523         Date/Time Order Dose Route Action     12/23/2024 1334 sodium chloride 0.9 % bolus 1,000 mL 1,000 mL Intravenous New Bag     12/23/2024 1341 insulin regular (HumuLIN R,NovoLIN R) injection 6 Units 6 Units Intravenous Given     12/23/2024 1454 sodium chloride 0.9 % bolus 2,000 mL 2,000 mL Intravenous New Bag            Scheduled Medications:  [Held by provider] aspirin, 81 mg, Oral, Daily  heparin (porcine), 5,000 Units, Subcutaneous, Q8H SHARON  insulin lispro, 1-5 Units, Subcutaneous, TID AC      Continuous IV Infusions:  sodium chloride, 150 mL/hr, Intravenous, Continuous      PRN Meds:  acetaminophen, 650 mg, Oral, Q6H PRN  oxyCODONE, 2.5 mg, Oral, Q6H PRN      ED Triage Vitals   Temperature Pulse Respirations Blood Pressure SpO2 Pain Score   12/23/24 1303 12/23/24 1303 12/23/24 1303 12/23/24 1303 12/23/24 1303 12/23/24 1709   98.1 °F (36.7 °C) 104 18 109/57 99 % 9     Weight (last 2 days)       Date/Time Weight    12/24/24 0941 64 (141)    12/23/24 2300 64.3 (141.76)            Vital Signs (last 3 days)       Date/Time Temp Pulse Resp BP MAP (mmHg) SpO2 O2 Device Patient Position -  Orthostatic VS Pain    12/24/24 11:21:50 97.3 °F (36.3 °C) 101 -- 114/47 69 96 % -- -- --    12/24/24 0941 -- 97 -- 100/49 -- -- -- -- --    12/24/24 0700 97.9 °F (36.6 °C) 97 15 100/49 71 94 % -- Lying --    12/24/24 0634 -- -- -- -- -- -- -- -- 10 - Worst Possible Pain    12/24/24 0300 97.8 °F (36.6 °C) -- 16 117/52 75 -- -- -- --    12/24/24 0110 -- -- -- -- -- -- -- -- 4    12/23/24 2234 99.5 °F (37.5 °C) 89 16 131/61 88 -- -- -- --    12/23/24 1900 97.5 °F (36.4 °C) -- 16 137/60 87 -- -- -- --    12/23/24 1719 -- 70 -- -- -- 100 % -- -- --    12/23/24 1709 -- -- -- -- -- 94 % None (Room air) -- 9    12/23/24 15:33:02 -- 66 -- -- -- 98 % -- Lying --    12/23/24 1500 -- -- -- 126/60 86 -- None (Room air) -- --    12/23/24 1303 98.1 °F (36.7 °C) 104 18 109/57 75 99 % None (Room air) Sitting --              Pertinent Labs/Diagnostic Test Results:   Radiology:  CT abdomen pelvis wo contrast   Final Interpretation by Devang Velasquez MD (12/23 1610)      Small bowel obstruction with transition point in the upper abdomen where there is an indeterminate partially calcified intraluminal small bowel lesion. Gallstone ileus in the differential but unclear when gallbladder was removed. Recommend evaluation by    general surgery.      Tiny bilateral nonobstructing renal calculi. No hydronephrosis.      Additional chronic/incidental findings as detailed above.            I personally epic secured messaged discussed this study with Rip Swan on 12/23/2024 4:04 PM.                        Workstation performed: [id number]         XR abdomen obstruction series    (Results Pending)     Cardiology:  Echo complete w/ contrast if indicated   Final Result by Lizzie Acevedo MD (12/24 6777)        Left Ventricle: Left ventricular cavity size is normal. Wall thickness    is mildly increased. The left ventricular ejection fraction is 55%.    Systolic function is normal. Although no diagnostic regional wall motion     abnormality was identified, this possibility cannot be completely excluded    on the basis of this study. Diastolic function is mildly abnormal,    consistent with grade I (abnormal) relaxation.     Mitral Valve: There is mild annular calcification.         ECG 12 lead   Final Result by Taylor Kellogg MD (12/23 1436)   Normal sinus rhythm   Nonspecific ST abnormality   Abnormal ECG   No previous ECGs available   Confirmed by Taylor Kellogg ([id number]) on 12/23/2024 2:36:12 PM          Results from last 7 days   Lab Units 12/24/24  0536 12/23/24  2349 12/23/24  1333   WBC Thousand/uL 11.56*  --  15.10*   HEMOGLOBIN g/dL 9.5*  --  12.1   HEMATOCRIT % 30.3*  --  37.1   PLATELETS Thousands/uL 348 398* 604*   TOTAL NEUT ABS Thousands/µL 9.69*  --  12.95*         Results from last 7 days   Lab Units 12/24/24  1135 12/24/24  0536 12/24/24  0000 12/23/24  1700 12/23/24  1333   SODIUM mmol/L 133* 130* 129* 128* 124*   POTASSIUM mmol/L 3.9 4.7 4.4 5.0 5.5*   CHLORIDE mmol/L 86* 82* 79* 77* 63*   CO2 mmol/L 21 23 23 23 24   ANION GAP mmol/L 26* 25* 27* 28* 37*   BUN mg/dL 194* 199* 201* 194* 205*   CREATININE mg/dL 6.31* 6.97* 7.17* 7.40* 8.34*   EGFR ml/min/1.73sq m 5 5 4 4 4   CALCIUM mg/dL 8.2* 7.8* 7.8* 7.7* 8.8   MAGNESIUM mg/dL  --   --   --   --  2.2   PHOSPHORUS mg/dL  --  12.6*  --   --   --      Results from last 7 days   Lab Units 12/23/24  1333   AST U/L 10*   ALT U/L 12   ALK PHOS U/L 82   TOTAL PROTEIN g/dL 7.4   ALBUMIN g/dL 3.6   TOTAL BILIRUBIN mg/dL 0.58   BILIRUBIN DIRECT mg/dL 0.17     Results from last 7 days   Lab Units 12/24/24  1120 12/24/24  0732 12/23/24 2054 12/23/24  1708 12/23/24  1315   POC GLUCOSE mg/dl 140 140 178* 178* 371*     Results from last 7 days   Lab Units 12/24/24  1135 12/24/24  0536 12/24/24  0000 12/23/24  1700 12/23/24  1333   GLUCOSE RANDOM mg/dL 134 138 173* 196* 379*             Beta- Hydroxybutyrate   Date Value Ref Range Status   12/23/2024 4.34 (H) 0.02 - 0.27 mmol/L Final       Results from last 7 days   Lab Units 12/23/24  1719   PH ART  7.437   PCO2 ART mm Hg 34.4*   PO2 ART mm Hg 92.3   HCO3 ART mmol/L 22.7   BASE EXC ART mmol/L -1.1   O2 CONTENT ART mL/dL 14.7*   O2 HGB, ARTERIAL % 95.0   ABG SOURCE  Radial, Left     Results from last 7 days   Lab Units 12/23/24  1333   PH LYNDA  7.437*   PCO2 LYNDA mm Hg 37.0*   PO2 LYNDA mm Hg 35.5   HCO3 LYNDA mmol/L 24.4   BASE EXC LYNDA mmol/L 0.5   O2 CONTENT LYNDA ml/dL 10.5   O2 HGB, VENOUS % 58.5*         Results from last 7 days   Lab Units 12/24/24  1135   LACTIC ACID mmol/L 0.6     Results from last 7 days   Lab Units 12/24/24  0536   FERRITIN ng/mL 125         Results from last 7 days   Lab Units 12/24/24  1201 12/24/24  1156 12/24/24  0642   CLARITY UA  Turbid  --  Turbid   COLOR UA  Yellow  --  Yellow   SPEC GRAV UA  1.016  --  1.016   PH UA  5.0  --  5.0   GLUCOSE UA mg/dl Negative  --  Negative   KETONES UA mg/dl Trace*  --  Negative   BLOOD UA  Negative  --  Trace*   PROTEIN UA mg/dl Trace*  --  Trace*   NITRITE UA  Negative  --  Negative   BILIRUBIN UA  Negative  --  Negative   UROBILINOGEN UA (BE) mg/dl <2.0  --  <2.0   LEUKOCYTES UA  Trace*  --  Moderate*   WBC UA /hpf 10-20*  --  30-50*   RBC UA /hpf 2-4*  --  2-4*   BACTERIA UA /hpf None Seen  --  Occasional   EPITHELIAL CELLS WET PREP /hpf Occasional  --  Occasional   SODIUM UR   --  23  --    CREATININE UR mg/dL  --   --  107.3       Past Medical History:   Diagnosis Date    Diabetes mellitus (HCC)     Hypertension     Osteoporosis     pt denies on PT eval 9-23-20     Present on Admission:   Type 2 diabetes mellitus with microalbuminuria, without long-term current use of insulin (HCC)   Hypertension associated with diabetes  (HCC)   Metabolic alkalosis      Admitting Diagnosis: Hyperkalemia [E87.5]  Weakness [R53.1]  Acute kidney injury (HCC) [N17.9]  Generalized weakness [R53.1]  Uncontrolled diabetes mellitus with hyperglycemia (HCC) [E11.65]  Age/Sex: 84 y.o. female    Network  Utilization Review Department  ATTENTION: Please call with any questions or concerns to [phone] and carefully listen to the prompts so that you are directed to the right person. All voicemails are confidential.   For Discharge needs, contact Care Management DC Support Team at [phone] opt. 2  Send all requests for admission clinical reviews, approved or denied determinations and any other requests to dedicated fax number below belonging to the campus where the patient is receiving treatment. List of dedicated fax numbers for the Facilities:  FACILITY NAME UR FAX NUMBER   ADMISSION DENIALS (Administrative/Medical Necessity) [phone]   DISCHARGE SUPPORT TEAM (NETWORK) [phone]   PARENT CHILD HEALTH (Maternity/NICU/Pediatrics) [phone]   [facility] [phone]   [facility] [phone]   [facility] [phone]   [facility] [phone]   [facility] [phone]   [facility] [phone]   [facility] [phone]   [facility] [phone]   [facility] [phone]   [facility] [phone]   [facility] [phone]   [facility] [phone]

## 2024-12-24 NOTE — ASSESSMENT & PLAN NOTE
SBO vs Ileus. Pt is 83 y/o with nausea and vomiting for 10 days.     CT 12/23- Small bowel obstruction with transition point in the upper abdomen where there is an indeterminate partially calcified intraluminal small bowel lesion. Gallstone ileus in the differential but unclear when gallbladder was removed.     WBC 11.56 (15.10)    Plan:  NPO  I/Os   F/u lactate acid  Obstruction series today   Possible NG tube insertion

## 2024-12-24 NOTE — ASSESSMENT & PLAN NOTE
Patient initially presented after having significant nausea and vomiting 12/13 to 12/15.  The patient continued to have poor oral intake and general malaise.  She continued to have lack of improvement the patient ultimately decided to be seen in the ED.    Creatinine on arrival 8.34 with , baseline creatinine 0.6-0.9  Sodium 125, potassium 5.5 on admission  Nephrology consulted  Continue with IVF  Dyer catheter for strict I's/O's  Trend BMP every 6 hours  As creatinine improved, low suspicion to ultimately need dialysis, however still on threshold  Creatinine improved to 6.97 BUN minimal improvement to 199  Echocardiogram showed LVEF 55% with grade 1 diastolic dysfunction, okay to continue with aggressive IVF  Urine creatinine 107.3, urine chloride less than 15, phosphorus 12.6, uric acid 13.9  Hold nephrotoxins which includes patient's home medications of metformin and lisinopril  CT abdomen pelvis- Small bowel obstruction with transition point in the upper abdomen where there is an indeterminate partially calcified intraluminal small bowel lesion   Appears clinically dry on exam

## 2024-12-24 NOTE — ASSESSMENT & PLAN NOTE
Lab Results   Component Value Date    HGBA1C 7.7 (A) 11/15/2024       Recent Labs     12/23/24  2054 12/24/24  0732 12/24/24  1120 12/24/24  1547   POCGLU 178* 140 140 109       Blood Sugar Average: Last 72 hrs:  (P) 186    Outpatient medications include lisinopril, will hold in setting of acute renal failure  Blood pressure currently normotensive, monitor closely

## 2024-12-25 PROBLEM — R65.20 SEVERE SEPSIS (HCC): Status: ACTIVE | Noted: 2024-01-01

## 2024-12-25 PROBLEM — J96.01 ACUTE RESPIRATORY FAILURE WITH HYPOXIA (HCC): Status: ACTIVE | Noted: 2024-01-01

## 2024-12-25 PROBLEM — E87.29 HIGH ANION GAP METABOLIC ACIDOSIS: Status: ACTIVE | Noted: 2024-01-01

## 2024-12-25 PROBLEM — K56.609 SMALL BOWEL OBSTRUCTION (HCC): Status: RESOLVED | Noted: 2024-01-01 | Resolved: 2024-01-01

## 2024-12-25 PROBLEM — R57.9 SHOCK (HCC): Status: ACTIVE | Noted: 2024-01-01

## 2024-12-25 PROBLEM — K63.1 BOWEL PERFORATION (HCC): Status: ACTIVE | Noted: 2024-01-01

## 2024-12-25 PROBLEM — K56.3 GALLSTONE ILEUS (HCC): Status: ACTIVE | Noted: 2024-01-01

## 2024-12-25 PROBLEM — G93.40 ACUTE ENCEPHALOPATHY: Status: ACTIVE | Noted: 2024-01-01

## 2024-12-25 PROBLEM — E87.3 METABOLIC ALKALOSIS: Status: RESOLVED | Noted: 2024-01-01 | Resolved: 2024-01-01

## 2024-12-25 PROBLEM — E87.1 HYPONATREMIA: Status: RESOLVED | Noted: 2024-01-01 | Resolved: 2024-01-01

## 2024-12-25 PROBLEM — A41.9 SEVERE SEPSIS (HCC): Status: ACTIVE | Noted: 2024-01-01

## 2024-12-25 NOTE — PROGRESS NOTES
"Progress Note - General Surgery   Patient: Tanisha Morrow   : 1940 Sex: female MRN: [id number]   CSN: [id number] PCP: Raquel Rocha DO  Unit/Bed#: OR POOL     SUBJECTIVE:   Patient developed severe abdominal pain on the floor RRT was called  Patient was transferred with diffuse peritonitis and emergent CT scan was performed  Which shows bowel perforation peritonitis free air ascites possible gallstone ileus  Elevated PT/INR and markedly elevated BUN/creatinine secondary to ATN  OBJECTIVE  Patient examined patient is intubated on Levophed and vasopressin  With no urine output  Vitals:   I/O last 24 hours:  In: 2118.9 [I.V.:1858.9; IV Piggyback:260]  Out: 2720 [Urine:2720]Blood pressure 117/54, pulse (!) 113, temperature 97.7 °F (36.5 °C), temperature source Oral, resp. rate (!) 30, height 5' 5\" (1.651 m), weight 64 kg (141 lb), SpO2 91%.,Body mass index is 23.46 kg/m².  Invasive Devices       Central Venous Catheter Line  Duration             CVC Central Lines 24 Triple 20cm <1 day              Peripheral Intravenous Line  Duration             Peripheral IV 24 Right Arm 1 day    Peripheral IV 24 Left;Ventral (anterior) Foot <1 day              Arterial Line  Duration             Arterial Line 24 Radial <1 day              Drain  Duration             Urethral Catheter Double-lumen 16 Fr. 1 day    Closed/Suction Drain Anterior;Left Hip Bulb 10 Fr. <1 day    Closed/Suction Drain Right RLQ Bulb 10 Fr. <1 day    Closed/Suction Drain Right RLQ Bulb 10 Fr. <1 day              Airway  Duration             ETT  Oral 8 mm <1 day                  Active medications:    Current Facility-Administered Medications:     acetaminophen (Ofirmev) injection 1,000 mg, 1,000 mg, Intravenous, Q8H PRN, 1,000 mg at 24    [Transfer Hold] chlorhexidine (PERIDEX) 0.12 % oral rinse 15 mL, 15 mL, Mouth/Throat, Q12H SHARON    fentaNYL 1000 mcg in sodium chloride 0.9% 100mL infusion, 50 mcg/hr, Intravenous, " Continuous, 100 mcg/hr at 12/25/24 0418    [Transfer Hold] fentaNYL injection 50 mcg, 50 mcg, Intravenous, Q1H PRN    heparin (porcine) subcutaneous injection 5,000 Units, 5,000 Units, Subcutaneous, Q8H SHARON, 5,000 Units at 12/24/24 2204 **AND** [COMPLETED] Platelet count, , , Once    [Transfer Hold] insulin lispro (HumALOG/ADMELOG) 100 units/mL subcutaneous injection 1-5 Units, 1-5 Units, Subcutaneous, Q6H SHARON **AND** Fingerstick Glucose (POCT), , , Q6H    midazolam (VERSED) 0.5 mg/mL (STANDARD CONCENTRATION) 100 mL infusion, 1 mg/hr, Intravenous, Continuous, Stopped at 12/25/24 0343    [Transfer Hold] midazolam (VERSED) injection 2 mg, 2 mg, Intravenous, Q2H PRN    NOREPINEPHRINE 4 MG  ML NSS (CMPD ORDER) infusion, 1-30 mcg/min, Intravenous, Titrated, 20 mcg/min at 12/25/24 0405    ondansetron (ZOFRAN) injection 4 mg, 4 mg, Intravenous, Q6H PRN, 4 mg at 12/25/24 0108    [Transfer Hold] pantoprazole (PROTONIX) injection 40 mg, 40 mg, Intravenous, Q24H SHARON    [COMPLETED] piperacillin-tazobactam (ZOSYN) 4.5 g in sodium chloride 0.9 % 100 mL IV LOADING DOSE, 4.5 g, Intravenous, Once, Stopped at 12/25/24 0309 **FOLLOWED BY** [Transfer Hold] piperacillin-tazobactam (ZOSYN) 4.5 g in sodium chloride 0.9 % 100 mL IVPB (EXTENDED INFUSION), 4.5 g, Intravenous, Q12H    propofol (DIPRIVAN) 1000 mg in 100 mL infusion (premix) **ADS Override Pull**, , ,     sodium bicarbonate 150 mEq in dextrose 5 % 1,000 mL infusion, 100 mL/hr, Intravenous, Continuous, 200 mL/hr at 12/25/24 0346    sodium chloride 0.9 % irrigation solution, , , PRN, 2,000 mL at 12/25/24 0415    sterile water irrigation solution, , , PRN, 1,000 mL at 12/25/24 0410    vasopressin (PITRESSIN) 20 Units in sodium chloride 0.9 % 100 mL infusion, 0.04 Units/min, Intravenous, Continuous, 0.04 Units/min at 12/25/24 1043    Physical Exam:   Patient is intubated  Normocephalic atraumatic PERRLA  Lungs clear bilaterally  Cardiac normal S1 normal S2  Abdomen markedly  distended as umbilical hernia and larger lower midline scar from previous surgery markedly tender Dyer catheter in place with 0 output  Skin dry  Ext: No clubbing, cyanosis, edema    Lab, Imaging and other studies:  Recent Results (from the past 24 hours)   Basic metabolic panel    Collection Time: 12/24/24  5:36 AM   Result Value Ref Range    Sodium 130 (L) 135 - 147 mmol/L    Potassium 4.7 3.5 - 5.3 mmol/L    Chloride 82 (L) 96 - 108 mmol/L    CO2 23 21 - 32 mmol/L    ANION GAP 25 (H) 4 - 13 mmol/L     (H) 5 - 25 mg/dL    Creatinine 6.97 (H) 0.60 - 1.30 mg/dL    Glucose 138 65 - 140 mg/dL    Calcium 7.8 (L) 8.4 - 10.2 mg/dL    eGFR 5 ml/min/1.73sq m   CBC and differential    Collection Time: 12/24/24  5:36 AM   Result Value Ref Range    WBC 11.56 (H) 4.31 - 10.16 Thousand/uL    RBC 3.53 (L) 3.81 - 5.12 Million/uL    Hemoglobin 9.5 (L) 11.5 - 15.4 g/dL    Hematocrit 30.3 (L) 34.8 - 46.1 %    MCV 86 82 - 98 fL    MCH 26.9 26.8 - 34.3 pg    MCHC 31.4 31.4 - 37.4 g/dL    RDW 15.9 (H) 11.6 - 15.1 %    MPV 9.8 8.9 - 12.7 fL    Platelets 348 149 - 390 Thousands/uL    nRBC 0 /100 WBCs    Segmented % 84 (H) 43 - 75 %    Immature Grans % 0 0 - 2 %    Lymphocytes % 11 (L) 14 - 44 %    Monocytes % 5 4 - 12 %    Eosinophils Relative 0 0 - 6 %    Basophils Relative 0 0 - 1 %    Absolute Neutrophils 9.69 (H) 1.85 - 7.62 Thousands/µL    Absolute Immature Grans 0.05 0.00 - 0.20 Thousand/uL    Absolute Lymphocytes 1.23 0.60 - 4.47 Thousands/µL    Absolute Monocytes 0.57 0.17 - 1.22 Thousand/µL    Eosinophils Absolute 0.01 0.00 - 0.61 Thousand/µL    Basophils Absolute 0.01 0.00 - 0.10 Thousands/µL   PTH, intact    Collection Time: 12/24/24  5:36 AM   Result Value Ref Range    .2 (H) 12.0 - 88.0 pg/mL   Uric acid    Collection Time: 12/24/24  5:36 AM   Result Value Ref Range    Uric Acid 13.9 (H) 2.0 - 7.5 mg/dL   Vitamin D 25 hydroxy    Collection Time: 12/24/24  5:36 AM   Result Value Ref Range    Vit D, 25-Hydroxy  49.4 30.0 - 100.0 ng/mL   TIBC Panel (incl. Iron, TIBC, % Iron Saturation)    Collection Time: 12/24/24  5:36 AM   Result Value Ref Range    Iron Saturation 20 15 - 50 %    TIBC 222.6 (L) 250 - 450 ug/dL    Iron 44 (L) 50 - 212 ug/dL    Transferrin 159 (L) 203 - 362 mg/dL    UIBC 179 155 - 355 ug/dL   Ferritin    Collection Time: 12/24/24  5:36 AM   Result Value Ref Range    Ferritin 125 11 - 307 ng/mL   Phosphorus    Collection Time: 12/24/24  5:36 AM   Result Value Ref Range    Phosphorus 12.6 (H) 2.3 - 4.1 mg/dL   Chloride, urine, random    Collection Time: 12/24/24  6:42 AM   Result Value Ref Range    Chloride, Ur <15 Reference range not established. mmol/L   UA (URINE) with reflex to Scope    Collection Time: 12/24/24  6:42 AM   Result Value Ref Range    Color, UA Yellow     Clarity, UA Turbid     Specific Gravity, UA 1.016 1.003 - 1.030    pH, UA 5.0 4.5, 5.0, 5.5, 6.0, 6.5, 7.0, 7.5, 8.0    Leukocytes, UA Moderate (A) Negative    Nitrite, UA Negative Negative    Protein, UA Trace (A) Negative mg/dl    Glucose, UA Negative Negative mg/dl    Ketones, UA Negative Negative mg/dl    Urobilinogen, UA <2.0 <2.0 mg/dl mg/dl    Bilirubin, UA Negative Negative    Occult Blood, UA Trace (A) Negative   Creatinine, urine, random    Collection Time: 12/24/24  6:42 AM   Result Value Ref Range    Creatinine, Ur 107.3 Reference range not established. mg/dL   Urine Microscopic    Collection Time: 12/24/24  6:42 AM   Result Value Ref Range    RBC, UA 2-4 (A) None Seen, 1-2 /hpf    WBC, UA 30-50 (A) None Seen, 1-2 /hpf    Epithelial Cells Occasional None Seen, Occasional /hpf    Bacteria, UA Occasional None Seen, Occasional /hpf    Hyaline Casts, UA 5-10 (A) None Seen /lpf   Fingerstick Glucose (POCT)    Collection Time: 12/24/24  7:32 AM   Result Value Ref Range    POC Glucose 140 65 - 140 mg/dl   Echo complete w/ contrast if indicated    Collection Time: 12/24/24 10:19 AM   Result Value Ref Range    BSA 1.71 m2    A4C EF 52 %     LVIDd 3.90 cm    LVIDS 2.70 cm    IVSd 1.30 cm    LVPWd 1.20 cm    LVOT diameter 1.9 cm    FS 31 28 - 44    MV E' Tissue Velocity Septal 9 cm/s    MV E' Tissue Velocity Lateral 11 cm/s    LA Volume Index (BP) 22.8 mL/m2    E/A ratio 0.54     E wave deceleration time 271 ms    MV Peak E Allan 51 cm/s    MV Peak A Allan 0.94 m/s    RVID d 2.7 cm    Tricuspid annular plane systolic excursion 2.00 cm    LA size 3.1 cm    LA length (A2C) 5.50 cm    LA volume (BP) 39 mL    RAA A4C 11.9 cm2    MV stenosis pressure 1/2 time 79 ms    MV valve area p 1/2 method 2.78     Ao root 3.10 cm    Asc Ao 3 cm    Left ventricular stroke volume (2D) 41.00 mL    IVS 1.3 cm    LEFT VENTRICLE SYSTOLIC VOLUME (MOD BIPLANE) 2D 26 mL    LV DIASTOLIC VOLUME (MOD BIPLANE) 2D 66 mL    Left Atrium Area-systolic Four Chamber 16.4 cm2    Left Atrium Area-systolic Apical Two Chamber 16.7 cm2    LVSV, 2D 41 mL    LVOT area 2.83 cm2    LV EF 55    Fingerstick Glucose (POCT)    Collection Time: 12/24/24 11:20 AM   Result Value Ref Range    POC Glucose 140 65 - 140 mg/dl   Basic metabolic panel    Collection Time: 12/24/24 11:35 AM   Result Value Ref Range    Sodium 133 (L) 135 - 147 mmol/L    Potassium 3.9 3.5 - 5.3 mmol/L    Chloride 86 (L) 96 - 108 mmol/L    CO2 21 21 - 32 mmol/L    ANION GAP 26 (H) 4 - 13 mmol/L     (H) 5 - 25 mg/dL    Creatinine 6.31 (H) 0.60 - 1.30 mg/dL    Glucose 134 65 - 140 mg/dL    Calcium 8.2 (L) 8.4 - 10.2 mg/dL    eGFR 5 ml/min/1.73sq m   Lactic acid, plasma (w/reflex if result > 2.0)    Collection Time: 12/24/24 11:35 AM   Result Value Ref Range    LACTIC ACID 0.6 0.5 - 2.0 mmol/L   Sodium, urine, random    Collection Time: 12/24/24 11:56 AM   Result Value Ref Range    Sodium, Ur 23 Reference range not established.   Osmolality, urine    Collection Time: 12/24/24 11:56 AM   Result Value Ref Range    Osmolality, Ur 395 250 - 900 mmol/KG   Albumin / creatinine urine ratio    Collection Time: 12/24/24 11:56 AM   Result  Value Ref Range    Creatinine, Ur 84.7 Reference range not established. mg/dL    Albumin,U,Random 39.3 (H) <20.0 mg/L    Albumin Creat Ratio 46 (H) 0 - 30 mg/g creatinine   UA w Reflex to Microscopic w Reflex to Culture    Collection Time: 12/24/24 12:01 PM    Specimen: Urine, Clean Catch   Result Value Ref Range    Color, UA Yellow     Clarity, UA Turbid     Specific Gravity, UA 1.016 1.003 - 1.030    pH, UA 5.0 4.5, 5.0, 5.5, 6.0, 6.5, 7.0, 7.5, 8.0    Leukocytes, UA Trace (A) Negative    Nitrite, UA Negative Negative    Protein, UA Trace (A) Negative mg/dl    Glucose, UA Negative Negative mg/dl    Ketones, UA Trace (A) Negative mg/dl    Urobilinogen, UA <2.0 <2.0 mg/dl mg/dl    Bilirubin, UA Negative Negative    Occult Blood, UA Negative Negative   Urine Microscopic    Collection Time: 12/24/24 12:01 PM   Result Value Ref Range    RBC, UA 2-4 (A) None Seen, 1-2 /hpf    WBC, UA 10-20 (A) None Seen, 1-2 /hpf    Epithelial Cells Occasional None Seen, Occasional /hpf    Bacteria, UA None Seen None Seen, Occasional /hpf    Hyaline Casts, UA 5-10 (A) None Seen /lpf   Fingerstick Glucose (POCT)    Collection Time: 12/24/24  3:47 PM   Result Value Ref Range    POC Glucose 109 65 - 140 mg/dl   Basic metabolic panel    Collection Time: 12/24/24  6:18 PM   Result Value Ref Range    Sodium 135 135 - 147 mmol/L    Potassium 4.0 3.5 - 5.3 mmol/L    Chloride 88 (L) 96 - 108 mmol/L    CO2 22 21 - 32 mmol/L    ANION GAP 25 (H) 4 - 13 mmol/L     (H) 5 - 25 mg/dL    Creatinine 5.81 (H) 0.60 - 1.30 mg/dL    Glucose 132 65 - 140 mg/dL    Calcium 8.4 8.4 - 10.2 mg/dL    eGFR 6 ml/min/1.73sq m   Fingerstick Glucose (POCT)    Collection Time: 12/24/24  8:38 PM   Result Value Ref Range    POC Glucose 122 65 - 140 mg/dl   Fingerstick Glucose (POCT)    Collection Time: 12/24/24 11:38 PM   Result Value Ref Range    POC Glucose 144 (H) 65 - 140 mg/dl   Lactic acid, plasma (w/reflex if result > 2.0)    Collection Time: 12/25/24 12:51  AM   Result Value Ref Range    LACTIC ACID 6.1 (HH) 0.5 - 2.0 mmol/L   CBC and differential    Collection Time: 12/25/24 12:51 AM   Result Value Ref Range    WBC 2.89 (L) 4.31 - 10.16 Thousand/uL    RBC 3.43 (L) 3.81 - 5.12 Million/uL    Hemoglobin 9.2 (L) 11.5 - 15.4 g/dL    Hematocrit 31.6 (L) 34.8 - 46.1 %    MCV 92 82 - 98 fL    MCH 26.8 26.8 - 34.3 pg    MCHC 29.1 (L) 31.4 - 37.4 g/dL    RDW 16.4 (H) 11.6 - 15.1 %    MPV 9.8 8.9 - 12.7 fL    Platelets 357 149 - 390 Thousands/uL   Blood gas, venous    Collection Time: 12/25/24 12:51 AM   Result Value Ref Range    pH, Luis Alfredo 7.254 (L) 7.300 - 7.400    pCO2, Luis Alfredo 28.5 (L) 42.0 - 50.0 mm Hg    pO2, Luis Alfredo 48.7 (H) 35.0 - 45.0 mm Hg    HCO3, Luis Alfredo 12.3 (L) 24 - 30 mmol/L    Base Excess, Luis Alfredo -13.5 mmol/L    O2 Content, Luis Alfredo 9.3 ml/dL    O2 HGB, VENOUS 70.8 60.0 - 80.0 %   Basic metabolic panel    Collection Time: 12/25/24 12:51 AM   Result Value Ref Range    Sodium 139 135 - 147 mmol/L    Potassium 3.8 3.5 - 5.3 mmol/L    Chloride 98 96 - 108 mmol/L    CO2 13 (L) 21 - 32 mmol/L    ANION GAP 28 (H) 4 - 13 mmol/L     (H) 5 - 25 mg/dL    Creatinine 5.41 (H) 0.60 - 1.30 mg/dL    Glucose 128 65 - 140 mg/dL    Calcium 7.1 (L) 8.4 - 10.2 mg/dL    eGFR 6 ml/min/1.73sq m   Protime-INR    Collection Time: 12/25/24 12:51 AM   Result Value Ref Range    Protime 16.3 (H) 12.3 - 15.0 seconds    INR 1.24 (H) 0.85 - 1.19   Procalcitonin    Collection Time: 12/25/24 12:51 AM   Result Value Ref Range    Procalcitonin 23.11 (H) <=0.25 ng/ml   Manual Differential(PHLEBS Do Not Order)    Collection Time: 12/25/24 12:51 AM   Result Value Ref Range    Segmented % 64 43 - 75 %    Bands % 24 (H) 0 - 8 %    Lymphocytes % 6 (L) 14 - 44 %    Monocytes % 1 (L) 4 - 12 %    Eosinophils % 0 0 - 6 %    Basophils % 0 0 - 1 %    Metamyelocytes % 4 (H) 0 - 1 %    Myelocytes % 1 0 - 1 %    Absolute Neutrophils 2.54 1.85 - 7.62 Thousand/uL    Absolute Lymphocytes 0.17 (L) 0.60 - 4.47 Thousand/uL    Absolute  Monocytes 0.03 0.00 - 1.22 Thousand/uL    Absolute Eosinophils 0.00 0.00 - 0.40 Thousand/uL    Absolute Basophils 0.00 0.00 - 0.10 Thousand/uL    Absolute Metamyelocytes 0.12 (H) 0.00 - 0.10 Thousand/uL    Absolute Myelocytes 0.03 0.00 - 0.10 Thousand/uL    Total Counted      RBC Morphology Present     Platelet Estimate Adequate Adequate    Large Platelet Present     Anisocytosis Present    ABORh Recheck - Contact Blood Bank Prior to Collection    Collection Time: 12/25/24 12:51 AM   Result Value Ref Range    ABO Grouping A     Rh Factor Positive    Blood gas, arterial    Collection Time: 12/25/24  2:43 AM   Result Value Ref Range    pH, Arterial 7.395 7.350 - 7.450    pCO2, Arterial 29.5 (L) 36.0 - 44.0 mm Hg    pO2, Arterial 66.0 (L) 75.0 - 129.0 mm Hg    HCO3, Arterial 17.7 (L) 22.0 - 28.0 mmol/L    Base Excess, Arterial -6.4 mmol/L    O2 Content, Arterial 10.0 (L) 16.0 - 23.0 mL/dL    O2 HGB,Arterial  87.1 (L) 94.0 - 97.0 %    SOURCE Line, Arterial     Vent Type- AC AC     AC Rate 24     Tidal Volume 340 ml    Inspired Air (FIO2) 40     PEEP 6    Type and screen    Collection Time: 12/25/24  2:45 AM   Result Value Ref Range    ABO Grouping A     Rh Factor Positive     Antibody Screen Negative     Specimen Expiration Date [id number]    Lactic acid 2 Hours    Collection Time: 12/25/24  2:45 AM   Result Value Ref Range    LACTIC ACID 4.1 (HH) 0.5 - 2.0 mmol/L   Prepare Leukoreduced RBC: 2 Units    Collection Time: 12/25/24  3:54 AM   Result Value Ref Range    Unit Product Code K8602A32     Unit Number [id number]     Unit ABO A     Unit RH POS     Crossmatch Compatible     Unit Dispense Status Crossmatched     Unit Product Volume 350 ml    Unit Product Code B0533E76     Unit Number [id number]     Unit ABO A     Unit RH POS     Crossmatch Compatible     Unit Dispense Status Crossmatched     Unit Product Volume 350 ml     VTE Pharmacologic Prophylaxis: Heparin  VTE Mechanical Prophylaxis: sequential  compression device       Diet Orders   (From admission, onward)                 Start     Ordered    12/24/24 2013  Diet NPO  Diet effective now        References:    Adult Nutrition Support Algorithm    RD Therapeutic Diet Order Protocol   Question Answer Comment   Diet Type NPO    RD to adjust diet per protocol? Yes        12/24/24 2012                .  Admitting Diagnosis: Hyperkalemia [E87.5]  Weakness [R53.1]  Acute kidney injury (HCC) [N17.9]  Generalized weakness [R53.1]  Uncontrolled diabetes mellitus with hyperglycemia (HCC) [E11.65]  Code Status: Level 1 - Full Code  Patient Active Problem List   Diagnosis    Type 2 diabetes mellitus with microalbuminuria, without long-term current use of insulin (HCC)    Hypertension associated with diabetes  (HCC)    Hyperlipidemia associated with type 2 diabetes mellitus  (HCC)    Gout    Torticollis    Chronic neck pain    Hypercalcemia    Iron deficiency    Wears dentures    Severe sepsis (HCC)    Acute kidney failure (HCC)    Small bowel obstruction (HCC)    High anion gap metabolic acidosis    Acute encephalopathy    Shock (HCC)    Bowel perforation (HCC)    Acute respiratory failure with hypoxia (HCC)    Gallstone ileus (HCC)     PT/OT/ST reviewed.  Discussed with Dr. Mya Hanley MD critical care attending and anesthesiologist  Plan was coordinated with Nursing staff & Case management.  Nursing supervisor was informed  Patient family member Melvina Juarez was called multiple time multiple messages left by me critical care PA and critical care attending for emergent surgery in view of critical condition of the patient messages were left on phone #[phone]    Assessment/ Plan:  Tanisha Morrow is a 84 y.o. female 2 day(s) diffuse peritonitis  Status post RRT intubation patient was undergoing treatment for gallstone ileus and small bowel obstruction now hypotensive requiring high doses of pressors  Electrolyte imbalance/metabolic and cephalopathy/elevated  "PT/INR  Patient is intubated with no consent for surgery as next of kin was called multiple times    Administer consent was obtained and signed by me and critical care attending  And patient was taken on emergent basis for exploratory laparotomy small bowel resection      Counseling / Coordination of Care  Total floor / unit time spent today 30minutes.  Greater than 50% of total time was spent with the patient and / or family counseling and / or coordination of care.     Will Abdul MD MS [facility] FACS  [facility] physician group  12/25/24 4:45 AM  [facility]:  [address], [address] [place] [address]  Office  [phone] Fax [phone]    Portions of the record may have been created with voice recognition software. Occasional wrong word or \"sound a like\" substitutions may have occurred due to the inherent limitations of voice recognition software. Read the chart carefully and recognize, using context, where substitutions have occurred.        "

## 2024-12-25 NOTE — NURSING NOTE
Went back in to reinsert NG tube. Patient with labored breathing. Repositioned in bed. During NG insertion patient unable to follow commands. Tube inserted. Patient had faint pulse, tachycardia, hypotension, cold, clammy, mottled skin, and continuing labored breathing. Rapid response called

## 2024-12-25 NOTE — RAPID RESPONSE
Rapid Response Note  Tanisha Morrow 84 y.o. female MRN: [id number]  Unit/Bed#: -01 Encounter: [id number]    Rapid Response Notification(s):   Response called date/time:  12/24/2024 11:37 PM  Response team arrival date/time:  12/25/2024 11:38 PM  Response end date/time:  12/25/2024 12:17 AM  Level of care:  [facility]  Rapid response location:  [facility] unit ([facility])  Primary reason for rapid response call:  Acute change in BP, acute change in heart rate and acute change in neuro status    Rapid Response Intervention(s):   Airway:  None  Fluids administered:  Normal saline  Medications administered: norepinephrine.       Assessment:   Shock    Plan:   Pressure bag 2L, start norepinephrine, stat CT A/P w/ contrast, transfer to ICU     Rapid Response Outcome:   Transfer:  Transfer to ICU  Primary service notified of transfer: Yes    Code Status: Level 1 (Full Code)      Family notified: Will contact niece after transfer to ICU     Background/Situation:   Tanisha Morrow is a 84 y.o. female who presents on 12/23 with a complaints of significant nausea, vomiting, and weakness. Today she is a rapid response for altered mental status, hypotension, and tachycardia. On arrival, she is poorly responsive and mottled from feet to mid-chest.    Review of Systems   Unable to perform ROS: Mental status change       Objective:   Vitals:    12/24/24 1510 12/24/24 2118 12/24/24 2337 12/24/24 2340   BP: 114/56  (!) 82/49 (!) 79/52   BP Location:       Pulse: 72      Resp:  18     Temp: 97.6 °F (36.4 °C) 97.7 °F (36.5 °C)     TempSrc:  Oral     SpO2: (!) 87%      Weight:       Height:         Physical Exam  Constitutional:       Appearance: She is toxic-appearing.   HENT:      Head: Normocephalic and atraumatic.      Mouth/Throat:      Mouth: Mucous membranes are dry.   Eyes:      Pupils: Pupils are equal, round, and reactive to light.   Cardiovascular:      Rate and Rhythm: Regular rhythm. Tachycardia present.   Pulmonary:      Effort:  Respiratory distress present.   Abdominal:      General: There is distension.      Palpations: Abdomen is soft.      Tenderness: There is abdominal tenderness.   Genitourinary:     Comments: Dyer in place with scant yellow urine  Musculoskeletal:         General: No swelling.      Right lower leg: No edema.      Left lower leg: No edema.   Skin:     General: Skin is dry.      Coloration: Skin is mottled.   Neurological:      GCS: GCS eye subscore is 2. GCS verbal subscore is 2. GCS motor subscore is 4.

## 2024-12-25 NOTE — ASSESSMENT & PLAN NOTE
Continue aggressive volume resuscitation  Close intake and output  Begin sodium bicarbonate infusion  Appreciate nephrology recommendations

## 2024-12-25 NOTE — RESPIRATORY THERAPY NOTE
RT Ventilator Management Note  Tanisha Morrow 84 y.o. female MRN: [id number]  Unit/Bed#: ICU 10 Encounter: [id number]      Daily Screen         12/25/2024  0736             Patient safety screen outcome:: Failed    Not Ready for Weaning due to:: Going on Transport intubated;FiO2 >60%              Physical Exam:   Assessment Type: Assess only  General Appearance: Sedated  Respiratory Pattern: Assisted  Chest Assessment: Chest expansion symmetrical  Bilateral Breath Sounds: Clear  Cough: None  Suction: ET Tube  O2 Device: vent      Resp Comments: fio2 titrated to 50%, RR decreased to 16 as per ABG and CC provider       12/25/24 1014   Respiratory Assessment   Assessment Type Assess only   General Appearance Sedated   Respiratory Pattern Assisted   Chest Assessment Chest expansion symmetrical   Bilateral Breath Sounds Clear   Suction ET Tube   Resp Comments fio2 titrated to 50%, RR decreased to 16 as per ABG and CC provider   O2 Device vent   Vent Information   Vent ID brijesh   Vent type Drager   Drager Vent Mode AC/VC+   $ Pulse Oximetry Spot Check Charge Completed   SpO2 95 %   AC/VC+ Settings   Resp Rate (BPM) (S)  16 BPM   VT (mL) 340 mL   Insp Time (S) 0.85 S   FIO2 (%) (S)  50 %   PEEP (cmH2O) 8 cmH2O   Rise Time (%) 20 %   Trigger Sensitivity Flow (LPM) 2 LPM   Humidification Heater   Heater Temp 98.6 °F (37 °C)   AC/VC+ Actuals   Resp Rate (BPM) 20 BPM   VT (mL) 339 mL   MV (Obs) 6.31   MAP (cmH2O) 12 cmH2O   Peak Pressure (cmH2O) 22 cmH2O   I:E Ratio (Obs) 1:2.1   Static Compliance (mL/cmH20) 25.8 mL/cmH2O   Plateau Pressure (cm H2O) 16.7 cm H2O   AC/VC+ ALARMS   High Peak Pressure (cmH2O) 40 cmH2O   High Resp Rate (BPM) 40 BPM   High MV (L/min) 15 L/min   Low MV (L/min) 3.5 L/min   High VT (mL) 750 mL   Maintenance   Alarm (pink) cable attached Yes   Resuscitation bag with peep valve at bedside Yes   Water bag changed No   Circuit changed No   ETT  Oral 8 mm   Placement Date: 12/25/24   Preoxygenated: Yes   Type: Oral  Tube Size: 8 mm  Insertion: Atraumatic  Insertion attempts: 1  Placement Verification: Auscultation;End tidal CO2;Symmetrical chest wall movement  Secured at (cm): 23   Secured at (cm) 23   Measured from Lips   Secured Location (S)  Center   Repositioned (S)  Right to Center   Secured by Commercial tube rice   Site Condition Dry   Cuff Pressure (color) Green   HI-LO Suction  Continuous low suction   HI-LO Intervention Patent

## 2024-12-25 NOTE — ASSESSMENT & PLAN NOTE
Intubated on 12/25  Continue propofol/fentanyl for comfort while mechanically ventilated  Respiratory protocol  SBT when abdomen closed

## 2024-12-25 NOTE — PROGRESS NOTES
Tanisha Morrow is a 84 y.o. female who is currently ordered Vancomycin IV with management by the Pharmacy Consult service.  Relevant clinical data and objective / subjective history reviewed.  Vancomycin Assessment:  Indication and Goal AUC/Trough: Other, Intra-abdominal infection, -600, trough >10  Clinical Status:  new start  Micro:   pending  Renal Function:  SCr: 5.41 mg/dL  CrCl: 7.2 mL/min  Renal replacement: Not on dialysis  Days of Therapy: 1  Current Dose: 1000mg iV q48h (discontinued)  Vancomycin Plan:  New Dosinmg IV once then 1000mg IV daily prn for random vancomycin level of 15mcg/ml or less  Pulse dosing  Next Level: 24 Am labs  Renal Function Monitoring: Daily BMP and UOP  Pharmacy will continue to follow closely for s/sx of nephrotoxicity, infusion reactions and appropriateness of therapy.  BMP and CBC will be ordered per protocol. We will continue to follow the patient’s culture results and clinical progress daily.    Tara Haines, Pharmacist

## 2024-12-25 NOTE — RESPIRATORY THERAPY NOTE
"RT Protocol Note  Tanisha Morrow 84 y.o. female MRN: [id number]  Unit/Bed#: ICU 10 Encounter: [id number]    Assessment    Principal Problem:    Shock (HCC)  Active Problems:    Type 2 diabetes mellitus with microalbuminuria, without long-term current use of insulin (HCC)    Severe sepsis (HCC)    Acute kidney failure (HCC)    Small bowel obstruction (HCC)    High anion gap metabolic acidosis    Acute encephalopathy    Bowel perforation (HCC)    Acute respiratory failure with hypoxia (HCC)    Gallstone ileus (HCC)    Physical Exam:   Assessment Type: Assess only  General Appearance: Sedated  Respiratory Pattern: Assisted  Chest Assessment: Chest expansion symmetrical  Bilateral Breath Sounds: Diminished, Clear  Cough: None  Suction: ET Tube  O2 Device: vent    Vitals:  Blood pressure (!) 85/46, pulse (!) 108, temperature 97.7 °F (36.5 °C), temperature source Oral, resp. rate 18, height 5' 5\" (1.651 m), weight 64 kg (141 lb), SpO2 95%.    Results from last 7 days   Lab Units 12/25/24  0518 12/25/24  0243 12/23/24  1719   PH ART  7.450   < > 7.437   PCO2 ART mm Hg 30.8*   < > 34.4*   PO2 ART mm Hg 67.5*   < > 92.3   HCO3 ART mmol/L 20.9*   < > 22.7   BASE EXC ART mmol/L -2.6   < > -1.1   O2 CONTENT ART mL/dL 10.1*   < > 14.7*   O2 HGB, ARTERIAL % 89.1*   < > 95.0   ABG SOURCE  Line, Arterial   < > Radial, Left   VANI TEST   --   --  Yes   NON VENT ROOM AIR %  --   --  21    < > = values in this interval not displayed.         O2 Device: vent     Plan    Respiratory Plan: Vent/NIV/HFNC        Resp Comments: pt remains intubated, plans to go back to the OR per open belly   "

## 2024-12-25 NOTE — OP NOTE
General SurgeryLAPAROTOMY EXPLORATORY, ABTHERA WOUND VAC PLACEMENT  Op Note    Tanisha Morrow  12/25/2024    Pre-op Diagnosis:   * No pre-op diagnosis entered *  PMH:  Past Medical History:   Diagnosis Date    Diabetes mellitus (HCC)     Hypertension     Osteoporosis     pt denies on PT eval 9-23-20     Post-op Diagnosis:  * No Diagnosis Codes entered *    Patient Active Problem List   Diagnosis    Type 2 diabetes mellitus with microalbuminuria, without long-term current use of insulin (HCC)    Hypertension associated with diabetes  (HCC)    Hyperlipidemia associated with type 2 diabetes mellitus  (HCC)    Gout    Torticollis    Chronic neck pain    Hypercalcemia    Iron deficiency    Wears dentures    Severe sepsis (HCC)    Acute kidney failure (HCC)    Small bowel obstruction (HCC)    High anion gap metabolic acidosis    Acute encephalopathy    Shock (HCC)    Bowel perforation (HCC)    Acute respiratory failure with hypoxia (HCC)    Gallstone ileus (HCC)       Procedure(s):  LAPAROTOMY EXPLORATORY, small bowel resection anastomosis ABTHERA WOUND VAC PLACEMENT    Surgeon(s):  MD Mich Munson PA-C    Anesthesia:  * No anesthesia type entered *    Assistant:  Mich Key PA-C  Services of [facility] was utilized as a first assistant as there is no surgical residency program at [facility]    Staff:   Circulator: Merly Díaz RN  Scrub Person: Justina Rosenberg    Operative Findings:  Proximal small bowel perforation diffuse fecal peritonitis  Nausea x 2 gallstones umbilical hernia and some adhesions between omentum and large bowel        OPERATIVE TECHNIQUE    Tanisha Morrow was identified by me.  An emergent administrative consent was obtained by critical care physician and myself for emergent need of exploratory laparotomy decompression abdominal pressure washout as patient had RRT and developed hypotension requiring high doses of pressors to maintain her blood pressure multiple  attempts were made by me and critical care physician RENETTA and critical care attending to reach her next of kin niece Melvina Juarez for surgical consent multiple messages were left on her answering machine    In view of her lifesaving procedure and critical condition of the patient patient was taken to the OR at [facility] with administrative consent    Tanisha Morrow was given pre-operative antibiotics. Body mass index is 23.46 kg/m². Tanisha Morrow is ASA 5E and Fire risk- 2. Tanisha Morrow was re-identified in the OR. Site was identified and detailed timeout was performed with Anesthesia and OR staff.    Anterior abdominal was exposed. Anterior bowel was painted with the ChloraPrep and draped in the usual sterile fashion.    Site was identified hernia was identified. Patient has a healed paramedian scar from previous surgeries. Midline incision was made the skin and subcutaneous tissue was cut along the line of incision proper hemostasis was achieved with Bovie cautery external oblique/rectus sheath was cut in the midline peritoneum was grasped with two grasper and was retracted upward the incision was made with a scalpel and extended superiorly inferiorly with the Bovie cautery safeguarding the bowel large amount of feculent fluid was sucked out from the abdominal cavity peritoneal fluid culture was obtained large portion of the omentum was exteriorized and bowel was explored which were attached to each other with flimsy fibrinous attachment site was identified for perforation which was leaking constantly frequent intestinal content  Impacted stone was identified at the site of the stone was markedly thinned out and dusky and decision was made to do bowel resection and perforation was included in the resection portion of the bowel Abdominal cavity was explored small bowel was explored from DJ to ileocecal valve dilated loops of bowel and collapsed loops of bowels were  identified for other portion of the stone proximal to obstruction distal to obstruction no other stone was identified bowel was explored from DJ to ileocecal valve  approximately 12 in long small bowel was resected.     Sites were selected distal to perforation and proximal to the impacted stone which site and the bowel bowel was the relatively healthy, windows and mesentery was created with the Bovie cautery with JASS 75 small bowel was the resect.      Side by side for 4 layer anastomosis was then created with the 2 layers of silks sutures were used for seromuscular stitches & 2 layers of a staple anastomosis    Staple entry site was closed with TA 65. TA staple line was the reinforced with running interlocking silk suture.  Prior to anastomosis the proximal bowel was decompressed with pool suction    Defect in the mesentery was then approximated with 3-0 interlocking Vicryl suture    All contaminated instruments and sponges were removed from the field gloves were changed.     Abdominal cavity was thoroughly lavaged copious amounts of fluid for irrigation a approximately 5 L of fluid was used for irrigation of right upper quadrant subphrenic and subhepatic area was thoroughly lavaged left upper quadrant perisplenic and left subphrenic area was thoroughly lavaged with copious amounts of fluid for irrigation OG-tube was noted to be in good position then copious amounts of fluid was used to irrigate both paracolic gutter and pelvis all the effluent was sucked out till noted to be clear in nature     3 RAVINDRA drains were placed 1 through the right lower quadrant upper incision this was right subhepatic area another RAVINDRA drain was placed through right lower quadrant lower incision to the pelvis another RAVINDRA drain was placed through the left lower quadrant incision to the left paracolic gutter and perisplenic area RAVINDRA drain were secured with 3-0 nylon suture    As patient is requiring large amount of pressors and hemodynamically  unstable secondary to diffuse peritonitis and gross fecal contamination    Bowel was then covered with omentum and ABThera wound VAC was placed to cover over the omentum which was covering the bowel and foam cut to the size of the abdominal wound was placed this was covered with adhesive film and connected to the KCI wound VAC all the RAVINDRA drains were connected to bulb no air leak was noted   .  No active bleeding was noted     Tanisha Morrow tolerated the procedure well, remain critically ill requiring pressors during and after the procedure.     At the end of the procedure No active bleeding was noted and all the instruments, needle and sponge counts were noted to be correct. Patient was transfered to ICU vented and in critical condition    I was present for the entire procedure     No qualified resident was available.     A physician's assistant was required due to the intensity of the surgery.     Physician assistant was required for hemostasis retraction and the closure of the surgical wound.     Physician assistant was present during the entire procedure    Estimated Blood Loss:  Minimal    Specimens:  Order Name Source Comment Collection Info Order Time   ANAEROBIC CULTURE AND GRAM STAIN Peritoneal Fluid peritoneal fluid Collected By: Will Abdul MD 12/25/2024  4:02 AM     Release to patient through Mychart   Immediate        BODY FLUID CULTURE AND GRAM STAIN Peritoneal Fluid peritoneal fluid Collected By: Will Abdul MD 12/25/2024  4:02 AM     Release to patient through Mychart   Immediate        TISSUE EXAM Small Bowel, NOS SMALL BOWEL WITH PERFORATION AND LARGE GALLSTONE Collected By: Will Abdul MD 12/25/2024  4:32 AM     Release to patient through Mychart   Immediate              Drains:  Closed/Suction Drain Right RLQ Bulb 10 Fr. (Active)       Closed/Suction Drain Right RLQ Bulb 10 Fr. (Active)       Closed/Suction Drain Anterior;Left Hip Bulb 10 Fr. (Active)       Urethral Catheter Double-lumen  16 Fr. (Active)   Reasons to continue Urinary Catheter  Accurate I&O assessment in critically ill patients (48 hr. max) 12/25/24 0000   Goal for Removal Remove after 48 hrs of I/O monitoring 12/25/24 0000   Site Assessment Clean;Skin intact;Patent 12/25/24 0000   Dyer Care Done 12/25/24 0000   Collection Container Standard drainage bag 12/25/24 0000   Securement Method Securing device (Describe) 12/25/24 0000   Output (mL) 225 mL 12/25/24 0000       Complications:  None    Total OR Time  1 Hr 0 Min 0 Sec .or    I was present for the entire procedure    Patient Disposition:  PACU       Will Abdul MD MS FRCS FACS  [facility]    Date: 12/25/2024  Time: 4:57 AM    Copy to PCP: Raquel Rocha DO

## 2024-12-25 NOTE — RESPIRATORY THERAPY NOTE
RT Ventilator Management Note  Tanisha Morrow 84 y.o. female MRN: [id number]  Unit/Bed#: ICU 10 Encounter: [id number]      Daily Screen         12/25/2024  0736             Patient safety screen outcome:: Failed    Not Ready for Weaning due to:: Going on Transport intubated;FiO2 >60%              Physical Exam:   Assessment Type: Assess only  General Appearance: Sedated  Respiratory Pattern: Normal  Chest Assessment: Chest expansion symmetrical  Bilateral Breath Sounds: Clear  Suction: ET Tube  O2 Device: vent      Resp Comments: no changes made, pt remains on full support     12/25/24 1427   Respiratory Assessment   Assessment Type Assess only   General Appearance Sedated   Respiratory Pattern Normal   Chest Assessment Chest expansion symmetrical   Bilateral Breath Sounds Clear   Suction ET Tube   Resp Comments no changes made, pt remains on full support   O2 Device vent   Vent Information   Vent ID brijesh   Vent type Drager   Drager Vent Mode AC/VC+   $ Pulse Oximetry Spot Check Charge Completed   SpO2 92 %   AC/VC+ Settings   Resp Rate (BPM) 16 BPM   VT (mL) 340 mL   Insp Time (S) 0.85 S   FIO2 (%) 50 %   PEEP (cmH2O) 8 cmH2O   Rise Time (%) 20 %   Trigger Sensitivity Flow (LPM) 2 LPM   Humidification Heater   Heater Temp 98.6 °F (37 °C)   AC/VC+ Actuals   Resp Rate (BPM) 29 BPM   VT (mL) 345 mL   MV (Obs) 9.4   MAP (cmH2O) 12 cmH2O   Peak Pressure (cmH2O) 19 cmH2O   I:E Ratio (Obs) 1:1.5   Static Compliance (mL/cmH20) 15.5 mL/cmH2O   Plateau Pressure (cm H2O) 17.8 cm H2O   AC/VC+ ALARMS   High Peak Pressure (cmH2O) 40 cmH2O   High Resp Rate (BPM) 40 BPM   High MV (L/min) 15 L/min   Low MV (L/min) 3.5 L/min   High VT (mL) 750 mL   ETT  Oral 8 mm   Placement Date: 12/25/24   Preoxygenated: Yes  Type: Oral  Tube Size: 8 mm  Insertion: Atraumatic  Insertion attempts: 1  Placement Verification: Auscultation;End tidal CO2;Symmetrical chest wall movement  Secured at (cm): 23   Secured at (cm) 23   Measured from Lips    Secured Location (S)  Right   Repositioned (S)  Center to Right   Secured by Commercial tube rice   Site Condition Dry   Cuff Pressure (color) Green   HI-LO Suction  Continuous low suction   HI-LO Secretions Scant   HI-LO Intervention Patent

## 2024-12-25 NOTE — RESPIRATORY THERAPY NOTE
12/25/24 0144   Respiratory Protocol   Protocol Initiated? Yes   Protocol Selection Respiratory   Language Barrier? Yes  (intubated)   Medical & Social History Reviewed? Yes   Diagnostic Studies Reviewed? Yes   Physical Assessment Performed? Yes   Respiratory Plan Vent/NIV/HFNC   Respiratory Assessment   General Appearance Sedated   Respiratory Pattern Assisted   Chest Assessment Chest expansion symmetrical   Bilateral Breath Sounds Clear;Diminished   Cough None   Resp Comments anticipate emergent OR procedure, no pulmonary hx or home medication use, wean from vent support as tolerated/indicated   O2 Device ventilator   Additional Assessments   Pulse (!) 119   Respirations (!) 23   SpO2 100 %

## 2024-12-25 NOTE — QUICK NOTE
Postop Check    S: POD #0 from ex-lap with resection, anastomosis, and Abthera placement    O:     /60 RR 28 SpO2 100%    Physical Exam  Gen: Ill-appearing, sedated, intubated  Neuro: GCS 10T E3 V1T M6- off sedation  HEENT: Atraumatic, PERRL  CV: Tachycardic, regular rhythm  Pulm: Lungs clear to auscultation, minimal secretions inline suction  GI: Abdomen soft, Abthera in place with scant bilious drainage, two right-sided JPs with serous drainage, left RAVINDRA with more bilious appearing drainage  : Dyer in place with yellow urine    Labs and imaging follwing transfer to the ICU reviewed    Shock, bowel perforation POD #0 from ex-lap- check stat labs, adjust sedation for goal RASS -2, transistion antibiotics to cefepime/Flagyl/vancomycyin, pharmacy consult

## 2024-12-25 NOTE — ASSESSMENT & PLAN NOTE
Intubated on 12/25  Continue Versed/fentanyl for comfort while mechanically ventilated  Consider transition to propofol or Precedex as hemodynamics improve  Respiratory protocol

## 2024-12-25 NOTE — PROCEDURES
Central Line Insertion    Date/Time: 12/25/2024 2:00 AM    Performed by: VERONIQUE Patterson  Authorized by: VERONIQUE Patterson    Patient location:  Bedside and ICU  Consent:     Consent obtained:  Emergent situation  Universal protocol:     Immediately prior to procedure, a time out was called: yes      Patient identity confirmed:  Arm band  Pre-procedure details:     Hand hygiene: Hand hygiene performed prior to insertion      Sterile barrier technique: All elements of maximal sterile technique followed      Skin preparation:  2% chlorhexidine    Skin preparation agent: Skin preparation agent completely dried prior to procedure    Indications:     Central line indications: medications requiring central line      Site selection rationale:  LIJ well-visualized on ultrasound  Anesthesia (see MAR for exact dosages):     Anesthesia method:  None  Procedure details:     Location:  Left internal jugular    Vessel type: vein      Laterality:  Left    Approach: percutaneous technique used      Patient position:  Flat    Catheter type:  Triple lumen 20cm    Catheter size:  7 Fr    Landmarks identified: yes      Ultrasound guidance: yes      Ultrasound image availability:  Still images obtained    Sterile ultrasound techniques: Sterile gel and sterile probe covers were used      Manometry confirmation: yes      Number of attempts:  1    Successful placement: yes      Catheter tip vessel location: superior vena cava    Post-procedure details:     Post-procedure:  Dressing applied and line sutured    Assessment:  Blood return through all ports, no pneumothorax on x-ray, free fluid flow and placement verified by x-ray

## 2024-12-25 NOTE — ASSESSMENT & PLAN NOTE
Likely related to bowel perforation  Continue vasopressors for goal MAP>65  Consider addition of stress dose steroids pending operative course  Trend endpoints of resuscitation  Continue aggressive volume resuscitation

## 2024-12-25 NOTE — ASSESSMENT & PLAN NOTE
Lab Results   Component Value Date    HGBA1C 7.7 (A) 11/15/2024       Recent Labs     12/25/24  1104 12/25/24  1711 12/25/24  2322 12/26/24  0012   POCGLU 241* 146* 90 95       Blood Sugar Average: Last 72 hrs:  (P) [id number]    Continue sliding scale insulin for now

## 2024-12-25 NOTE — ASSESSMENT & PLAN NOTE
Likely related to acuity of condition  Continue Versed/fentanyl for ventilatory comfort  Attempt to transition Versed to propofol or Precedex as hemodynamics improve  Frequent neurologic exams

## 2024-12-25 NOTE — ASSESSMENT & PLAN NOTE
Day 2 of antibiotics, presently on cefepime/Flagyl/vancomycin  Follow culture results, temperature, white count  Await speciation of body fluid culture

## 2024-12-25 NOTE — RESPIRATORY THERAPY NOTE
12/25/24 0143   Respiratory Assessment   General Appearance Sedated   Respiratory Pattern Assisted   Chest Assessment Chest expansion symmetrical   Bilateral Breath Sounds Clear;Diminished   Resp Comments pt placed on ventilator post intubation for hypoxia/respiratory distress   O2 Device ventilator   Vent Information   Vent ID Satnam   Vent type Drager   Drager Vent Mode AC/VC+   $ Vent Charge-INITIAL Yes   Ventilator Start Yes   $ Pulse Oximetry Spot Check Charge Completed   SpO2 98 %   AC/VC+ Settings   Resp Rate (BPM) 24 BPM   VT (mL) 340 mL   Insp Time (S) 0.7 S   FIO2 (%) 40 %   PEEP (cmH2O) 6 cmH2O   Rise Time (%) 20 %   Trigger Sensitivity Flow (LPM) 2 LPM   Humidification Heater   Heater Temp 98.6 °F (37 °C)   AC/VC+ Actuals   Resp Rate (BPM) 24 BPM   VT (mL) 345 mL   MV (Obs) 7.73   MAP (cmH2O) 9.1 cmH2O   Peak Pressure (cmH2O) 19 cmH2O   I:E Ratio (Obs) 1:2.6   Heater Temperature (Obs)   (unit warming)   AC/VC+ ALARMS   High Peak Pressure (cmH2O) 40 cmH2O   High Resp Rate (BPM) 40 BPM   High MV (L/min) 15 L/min   Low MV (L/min) 3.5 L/min   High VT (mL) 750 mL   Maintenance   Alarm (pink) cable attached Yes   Resuscitation bag with peep valve at bedside Yes   Water bag changed No   Circuit changed No   IHI Ventilator Associated Pneumonia Bundle   Head of Bed Elevated HOB Flat  (line insertion)   ETT  Oral 8 mm   Placement Date: 12/25/24   Preoxygenated: Yes  Type: Oral  Tube Size: 8 mm  Insertion: Atraumatic  Insertion attempts: 1  Placement Verification: Auscultation;End tidal CO2;Symmetrical chest wall movement  Secured at (cm): 23   Secured at (cm) 23   Measured from Lips   Secured Location Right   Secured by Commercial tube rice   Site Condition Dry   Cuff Pressure (cm H2O)   (MLT)   Cuff Pressure (color) Green   HI-LO Suction  Continuous low suction   HI-LO Secretions Scant   HI-LO Intervention Patent   Respiratory Charges    $ Intubation/Intubation Assist Yes

## 2024-12-25 NOTE — ASSESSMENT & PLAN NOTE
Likely related to acuity of condition  Continue fentanyl and propofol for ventilatory comfort  Frequent neurologic exmas

## 2024-12-25 NOTE — ASSESSMENT & PLAN NOTE
SBO vs Ileus. Pt is 85 y/o with nausea and vomiting for 10 days.     CT 12/23- Small bowel obstruction with transition point in the upper abdomen where there is an indeterminate partially calcified intraluminal small bowel lesion. Gallstone ileus in the differential but unclear when gallbladder was removed.     WBC 11.56 (15.10)    Plan:  NPO  I/Os   F/u lactate acid  Obstruction series today   Possible NG tube insertion

## 2024-12-25 NOTE — CONSULTS
Transfer/Accept - Critical Care/ICU   Name: Tanisha Morrow 84 y.o. female I MRN: [id number]  Unit/Bed#: ICU 10 I Date of Admission: 12/23/2024   Date of Service: 12/25/2024 I Hospital Day: 2   Consults  Physician Requesting Evaluation: Mya Hanley MD   Reason for Evaluation / Principal Problem: Shock        Assessment & Plan  Shock (HCC)  Likely related to bowel perforation  Continue vasopressors for goal MAP>65  Consider addition of stress dose steroids pending operative course  Trend endpoints of resuscitation  Continue aggressive volume resuscitation  Type 2 diabetes mellitus with microalbuminuria, without long-term current use of insulin (HCC)  Lab Results   Component Value Date    HGBA1C 7.7 (A) 11/15/2024       Recent Labs     12/24/24  1120 12/24/24  1547 12/24/24  2038 12/24/24  2338   POCGLU 140 109 122 144*       Blood Sugar Average: Last 72 hrs:  (P) 172.75    Continue sliding scale insulin for now  Severe sepsis (HCC)  Will send cultures now  Check MRSA swab  Start empiric Zosyn given significant clinical decline  Acute kidney failure (HCC)  Improving following IVF  Continue aggressive volume resuscitation  Trend renal parameters closely  Small bowel obstruction (HCC)  Appears worsened with evidence of pneumatosis  Contact surgery  Pain control  Continue NGT  High anion gap metabolic acidosis  Continue aggressive volume resuscitation  Close intake and output  Begin sodium bicarbonate infusion  Appreciate nephrology recommendations  Acute encephalopathy  Likely related to acuity of condition  Continue Versed/fentanyl for ventilatory comfort  Attempt to transition Versed to propofol or Precedex as hemodynamics improve  Frequent neurologic exams  Bowel perforation (HCC)  Urgent surgery evaluation  Maintain orogastric tube for now  NPO  Begin IV Protonix  Acute respiratory failure with hypoxia (HCC)  Intubated on 12/25  Continue Versed/fentanyl for comfort while mechanically ventilated  Consider transition  to propofol or Precedex as hemodynamics improve  Respiratory protocol  Disposition: Critical care    History of Present Illness   Tanisha Morrow is a 84 y.o. who presents on  with a complaint of several days of vomiting, abdominal pain, and malaise. She has a past medical history of type 2 diabetes, hypertension, and hyperlipidemia. Her work-up in the emergency room was concerning for renal failure and a small bowel obstruction. She was admitted to the internal medicine service and resuscitated with intravenous fluids with improvement in her renal parameters. She was evaluated by general surgery and had a negative obstruction series. Overnight on , she was a rapid response for altered mental status, hypotension, and tachycardia. She was bolused 2L IVF and started on norepinephrine. She underwent a CT scan with contrast that as concerning for a gallbladder ileus related to an obstructing gallstone that had now caused a perforation and pneumoperitoneum. Surgery was emergently contacted and the patient was resuscitated with an additional 2L IVF. Multiple attempts were made to reach the patient's emergency contact and several voice messges were left. The patient became more obtunded and hypoxic, necessitating intubation and placement of a central line. The patient is being emergently taken to the operating room/    History obtained from chart review and unobtainable from patient due to mental status.  Review of Systems: Review of Systems   Unable to perform ROS: Mental status change       Historical Information   Past Medical History:  No date: Diabetes mellitus (HCC)  No date: Hypertension  No date: Osteoporosis      Comment:  pt denies on PT eval 20 Past Surgical History:  No date:  SECTION   Current Outpatient Medications   Medication Instructions    Alcohol Swabs (Pharmacist Choice Alcohol) PADS Use as directed    allopurinol (ZYLOPRIM) 200 mg, Oral, Daily    aspirin 81 mg, Daily    B Complex  Vitamins (VITAMIN B COMPLEX PO) Take by mouth    Cholecalciferol (Vitamin D3) 50 MCG (2000 UT) capsule TAKE ONE (1) CAPSULE BY MOUTH DAILY    colchicine (COLCRYS) 0.6 mg tablet TAKE 2 TABLETS BY MOUTH, THEN 1 HOUR LATER TAKE 1 TABLET BY MOUTH AS NEEDED FOR ACUTE GOUT FLARE    ferrous sulfate 325 mg, Oral, Daily before breakfast    glucose blood test strip MEDICARE B    Lancet Devices (Adjustable Lancing Device) MISC MEDICARE B    LIFESCAN FINEPOINT LANCETS MISC Does not apply, Daily    lisinopril (ZESTRIL) 20 mg, Oral, Daily    metFORMIN (GLUCOPHAGE) 1,000 mg, Oral, 2 times daily with meals    [facility] Natural Products (Osteo Bi-Flex/5-Loxin Advanced) TABS Take by mouth    nateglinide (STARLIX) 60 mg, Oral, 2 times daily with meals    OneTouch Verio test strip USE TO TEST BLOOD SUGAR DAILY AS DIRECTED    No Known Allergies   Social History     Tobacco Use    Smoking status: Never    Smokeless tobacco: Never   Vaping Use    Vaping status: Never Used   Substance Use Topics    Alcohol use: Never    Drug use: Never    Family History   Problem Relation Age of Onset    Diabetes Mother     COPD Mother     Substance Abuse Mother           Objective :                   Vitals I/O      Most Recent Min/Max in 24hrs   Temp 97.7 °F (36.5 °C) Temp  Min: 97.3 °F (36.3 °C)  Max: 97.9 °F (36.6 °C)   Pulse (!) 118 Pulse  Min: 72  Max: 132   Resp (!) 27 Resp  Min: 15  Max: 41   /51 BP  Min: 79/52  Max: 133/71   O2 Sat 96 % SpO2  Min: 74 %  Max: 100 %      Intake/Output Summary (Last 24 hours) at 12/25/2024 0327  Last data filed at 12/24/2024 1855  Gross per 24 hour   Intake 800 ml   Output 1500 ml   Net -700 ml       Diet NPO    Invasive Monitoring   Arterial Line  Caron BP    No data recorded   MAP    No data recorded           Physical Exam   Physical Exam  Eyes:      Pupils: Pupils are equal, round, and reactive to light.   Skin:     General: Skin is dry.      Coloration: Skin is pale.   HENT:      Mouth/Throat:      Mouth:  Mucous membranes are dry.   Cardiovascular:      Rate and Rhythm: Regular rhythm. Tachycardia present.   Musculoskeletal:         General: No deformity or signs of injury.   Abdominal: General: There is distension.     Tenderness: There is abdominal tenderness. There is guarding.   Constitutional:       General: She is in acute distress.      Appearance: She is toxic-appearing.      Interventions: Face mask in place.   Pulmonary:      Effort: Tachypnea and respiratory distress present.      Breath sounds: Normal breath sounds.   Neurological:      Mental Status: She is lethargic.      GCS: GCS eye subscore is 2. GCS verbal subscore is 2. GCS motor subscore is 4.      Comments: Exam performed prior to intubation   Genitourinary/Anorectal:     Comments: Dyer in place with small amount of yellow urine  Dyer present.        Diagnostic Studies        Lab Results: I have reviewed the following results:     Medications:  Scheduled PRN   calcium gluconate, 2 g, Once  chlorhexidine, 15 mL, Q12H SHARON  heparin (porcine), 5,000 Units, Q8H SHARON  insulin lispro, 1-5 Units, Q6H SHARON  pantoprazole, 40 mg, Q24H SHARON  piperacillin-tazobactam, 4.5 g, Q12H      acetaminophen, 1,000 mg, Q8H PRN  fentaNYL, 50 mcg, Q1H PRN  midazolam, 2 mg, Q2H PRN  ondansetron, 4 mg, Q6H PRN       Continuous    fentaNYL, 50 mcg/hr, Last Rate: 50 mcg/hr (12/25/24 0248)  midazolam, 1 mg/hr, Last Rate: 1 mg/hr (12/25/24 0254)  norepinephrine, 1-30 mcg/min, Last Rate: 16 mcg/min (12/25/24 0214)  sodium bicarbonate 150 mEq in dextrose 5 % 1,000 mL infusion, 100 mL/hr, Last Rate: 100 mL/hr (12/25/24 0309)  vasopressin, 0.04 Units/min, Last Rate: 0.04 Units/min (12/25/24 0304)         Labs:   CBC    Recent Labs     12/24/24  0536 12/25/24  0051   WBC 11.56* 2.89*   HGB 9.5* 9.2*   HCT 30.3* 31.6*    357   BANDSPCT  --  24*     BMP    Recent Labs     12/24/24  1818 12/25/24  0051   SODIUM 135 139   K 4.0 3.8   CL 88* 98   CO2 22 13*   AGAP 25* 28*   BUN  188* 179*   CREATININE 5.81* 5.41*   CALCIUM 8.4 7.1*       Coags    Recent Labs     12/25/24  0051   INR 1.24*        Additional Electrolytes  Recent Labs     12/23/24  1333 12/24/24  0536   MG 2.2  --    PHOS  --  12.6*          Blood Gas    Recent Labs     12/25/24  0243   PHART 7.395   BDQ1DPA 29.5*   PO2ART 66.0*   CFF9VSB 17.7*   BEART -6.4   SOURCE Line, Arterial     Recent Labs     12/25/24  0051 12/25/24  0243   PHVEN 7.254*  --    HLM9BGQ 28.5*  --    PO2VEN 48.7*  --    AEZ1IBZ 12.3*  --    BEVEN -13.5  --    J1VADHE 70.8  --    SOURCE  --  Line, Arterial    LFTs  Recent Labs     12/23/24  1333   ALT 12   AST 10*   ALKPHOS 82   ALB 3.6   TBILI 0.58       Infectious  Recent Labs     12/25/24  0051   PROCALCITONI 23.11*     Glucose  Recent Labs     12/24/24  0536 12/24/24  1135 12/24/24  1818 12/25/24  0051   GLUC 138 134 132 128

## 2024-12-25 NOTE — ASSESSMENT & PLAN NOTE
Secondary to calcified gallstone  Discuss with general surgery management for gallbladder  POD #1 from ex-lap with small bowel resection, anastomosis, and Abthera placement  Return to OR per general surgery  Maintain NGT tube for now  NPO- discuss early initiation of TPN  Continue IV Protonix

## 2024-12-25 NOTE — RESPIRATORY THERAPY NOTE
12/25/24 0444   Respiratory Assessment   General Appearance Sedated   Respiratory Pattern Assisted;Spontaneous   Chest Assessment Chest expansion symmetrical   Bilateral Breath Sounds Clear;Diminished   Resp Comments pt received from OR, placed back on Drager ventilator   O2 Device ventilator   Vent Information   Vent ID Satnam   Vent type Drager   Drager Vent Mode AC/VC+   Is the patient reintubated? No   $ Pulse Oximetry Spot Check Charge Completed   SpO2 92 %   AC/VC+ Settings   Resp Rate (BPM) (S)  18 BPM  (per Hanley)   VT (mL) 340 mL   Insp Time (S) 0.85 S   FIO2 (%) 60 %   PEEP (cmH2O) 8 cmH2O  (previously changed by AP)   Rise Time (%) 20 %   Trigger Sensitivity Flow (LPM) 2 LPM   Humidification Heater   AC/VC+ Actuals   Resp Rate (BPM) 25 BPM   VT (mL) 339 mL   MV (Obs) 7.83   MAP (cmH2O) 12 cmH2O   Peak Pressure (cmH2O) 22 cmH2O   I:E Ratio (Obs) 1:2.7   Static Compliance (mL/cmH20) 18.8 mL/cmH2O   Plateau Pressure (cm H2O) 20.6 cm H2O   AC/VC+ ALARMS   High Peak Pressure (cmH2O) 40 cmH2O   High Resp Rate (BPM) 40 BPM   High MV (L/min) 15 L/min   Low MV (L/min) 3.5 L/min   High VT (mL) 750 mL   Maintenance   Alarm (pink) cable attached Yes   Resuscitation bag with peep valve at bedside Yes   Water bag changed No   Circuit changed No   ETT  Oral 8 mm   Placement Date: 12/25/24   Preoxygenated: Yes  Type: Oral  Tube Size: 8 mm  Insertion: Atraumatic  Insertion attempts: 1  Placement Verification: Auscultation;End tidal CO2;Symmetrical chest wall movement  Secured at (cm): 23   Secured at (cm) 23   Measured from Lips   Secured Location (S)  Center   Repositioned Right to Center   Secured by Commercial tube rice   Site Condition Dry   Cuff Pressure (color) Green   HI-LO Suction  Continuous low suction   HI-LO Secretions Scant   HI-LO Intervention Patent

## 2024-12-25 NOTE — ASSESSMENT & PLAN NOTE
Lab Results   Component Value Date    HGBA1C 7.7 (A) 11/15/2024   Being managed by Slim is slowly improving    Recent Labs     12/24/24  1120 12/24/24  1547 12/24/24 2038 12/24/24  2338   POCGLU 140 109 122 144*       Blood Sugar Average: Last 72 hrs:  (P) 172.75

## 2024-12-25 NOTE — NURSING NOTE
Patient restless after NG insertion. Continually trying to get out of bed. Unable to sit still. Patient pulled out NG tube.

## 2024-12-25 NOTE — PROCEDURES
Intubation    Date/Time: 12/25/2024 1:35 AM    Performed by: VERONIQUE Patterson  Authorized by: VERONIQUE Patterson    Patient location:  Bedside  Consent:     Consent obtained:  Emergent situation  Universal protocol:     Immediately prior to procedure, a time out was called: yes      Patient identity confirmed:  Arm band  Pre-procedure details:     Patient status:  Unresponsive    Mallampati score:  2    Pretreatment medications:  Etomidate    Paralytics:  Succinylcholine  Indications:     Indications for intubation: respiratory distress and respiratory failure    Procedure details:     Preoxygenation:  Nonrebreather mask    CPR in progress: no      Intubation method:  Oral    Oral intubation technique: Cox.    Laryngoscope blade:  Mac 3    Tube size (mm):  8.0    Tube type:  Cuffed and hi-lo    Number of attempts:  1    Tube visualized through cords: yes    Placement assessment:     ETT to lip:  23    Tube secured with:  ETT rice    Breath sounds:  Equal    Placement verification: chest rise, colorimetric ETCO2 device, CXR verification, direct visualization and equal breath sounds      CXR findings:  ETT in proper place    Ventilator settings:  VC 24/240/40/6

## 2024-12-25 NOTE — PROGRESS NOTES
Progress Note - Nephrology   Name: Tanisha Morrow 84 y.o. female I MRN: [id number]  Unit/Bed#: ICU 10 I Date of Admission: 12/23/2024   Date of Service: 12/25/2024 I Hospital Day: 2    Assessment & Plan  Acute kidney failure (HCC)  Renal function continue to improve.  Patient required urgent laparotomy because of perforation of the bowl    Intubated at this point and vasopressors    Does a good urine output  Type 2 diabetes mellitus with microalbuminuria, without long-term current use of insulin (HCC)  Lab Results   Component Value Date    HGBA1C 7.7 (A) 11/15/2024   Being managed by Slim is slowly improving    Recent Labs     12/24/24  1120 12/24/24  1547 12/24/24 2038 12/24/24  2338   POCGLU 140 109 122 144*       Blood Sugar Average: Last 72 hrs:  (P) 172.75    Small bowel obstruction (HCC)  Requiring urgent surgery because of intestinal perforation  Severe sepsis (HCC)  Likely to be intra-abdominal source.  On antibiotic for now  High anion gap metabolic acidosis  Due to lactic acidosis and slowly improving  Shock (HCC)  Due to sepsis  Bowel perforation (HCC)  Requiring urgent surgery likely because of bowl perforation  Acute respiratory failure with hypoxia (HCC)  Intubated at this point    I have reviewed the nephrology recommendations including acute kidney injury, with ICU, and we are in agreement with renal plan including the information outlined above.     Subjective   Brief History of Admission -admitted with acute kidney injury    Patient had persistent abdominal pain requiring another CT scan which revealed possible perforation    Patient was taken to the OR urgently was found to have bowel perforation    At present intubated and sedated and on vasopressors    Urine output is improving    Objective :  Temp:  [97.3 °F (36.3 °C)-97.7 °F (36.5 °C)] 97.7 °F (36.5 °C)  HR:  [] 108  BP: ()/(34-88) 85/46  Resp:  [18-41] 18  SpO2:  [57 %-100 %] 95 %  O2 Device: None (Room air)    Current Weight:  Weight - Scale: 64 kg (141 lb)  First Weight: Weight - Scale: 64.3 kg (141 lb 12.1 oz)  I/O         12/23 0701  12/24 0700 12/24 0701 12/25 0700 12/25 0701 12/26 0700    P.O.  0     I.V. (mL/kg)  3521.4 (55)     IV Piggyback  1458.3     Total Intake(mL/kg)  4979.7 (77.8)     Urine (mL/kg/hr) 1095 1875 (1.2)     Drains  160     Total Output 1095 2035     Net -1095 +2944.7            Unmeasured Urine Occurrence 1 x            Physical Exam  Constitutional:       General: She is not in acute distress.     Appearance: She is well-developed.      Comments: Intubated   HENT:      Head: Normocephalic.      Mouth/Throat:      Mouth: Mucous membranes are moist.   Eyes:      General: No scleral icterus.     Conjunctiva/sclera: Conjunctivae normal.   Neck:      Vascular: No JVD.   Cardiovascular:      Rate and Rhythm: Normal rate.      Heart sounds: Normal heart sounds.   Pulmonary:      Effort: Pulmonary effort is normal.      Breath sounds: No wheezing.   Abdominal:      Palpations: Abdomen is soft.      Tenderness: There is no abdominal tenderness.   Musculoskeletal:         General: Normal range of motion.      Cervical back: Neck supple.   Skin:     General: Skin is warm.      Findings: No rash.         Medications:    Current Facility-Administered Medications:     acetaminophen (Ofirmev) injection 1,000 mg, 1,000 mg, Intravenous, Q8H PRN, Mich Key PA-C, Last Rate: 400 mL/hr at 12/24/24 2204, 1,000 mg at 12/24/24 2204    albumin human (FLEXBUMIN) 25 % injection 25 g, 25 g, Intravenous, Q6H, VERONIQUE Buck, 25 g at 12/25/24 0731    cefepime (MAXIPIME) 1,000 mg in dextrose 5 % 50 mL IVPB, 1,000 mg, Intravenous, Q24H, VERONIQUE Patterson, Last Rate: 100 mL/hr at 12/25/24 0539, 1,000 mg at 12/25/24 0539    chlorhexidine (PERIDEX) 0.12 % oral rinse 15 mL, 15 mL, Mouth/Throat, Q12H SHARON, Mich Key PA-C, 15 mL at 12/25/24 0938    fentaNYL 1000 mcg in sodium chloride 0.9% 100mL  infusion, 75 mcg/hr, Intravenous, Continuous, VERONIQUE Buck, Last Rate: 7.5 mL/hr at 12/25/24 0639, 75 mcg/hr at 12/25/24 0639    fentaNYL injection 50 mcg, 50 mcg, Intravenous, Q1H PRN, Mich Key PA-C    heparin (porcine) subcutaneous injection 5,000 Units, 5,000 Units, Subcutaneous, Q8H SHARON, 5,000 Units at 12/25/24 0555 **AND** [COMPLETED] Platelet count, , , Once, Rip Swan PA-C    insulin lispro (HumALOG/ADMELOG) 100 units/mL subcutaneous injection 1-5 Units, 1-5 Units, Subcutaneous, Q6H SHARON **AND** Fingerstick Glucose (POCT), , , Q6H, Mich Key PA-C    magnesium sulfate 4 g/100 mL IVPB (premix) 4 g, 4 g, Intravenous, Once, VERONIQUE Buck, Last Rate: 25 mL/hr at 12/25/24 0731, 4 g at 12/25/24 0731    metroNIDAZOLE (FLAGYL) IVPB (premix) 500 mg 100 mL, 500 mg, Intravenous, Q8H, VERONIQUE Patterson, Last Rate: 200 mL/hr at 12/25/24 0545, 500 mg at 12/25/24 0545    midazolam (VERSED) 0.5 mg/mL (STANDARD CONCENTRATION) 100 mL infusion, 1 mg/hr, Intravenous, Continuous, VERONIQUE Buck, Last Rate: 4 mL/hr at 12/25/24 0500, 2 mg/hr at 12/25/24 0500    midazolam (VERSED) injection 2 mg, 2 mg, Intravenous, Q2H PRN, Mich Key PA-C    multi-electrolyte (PLASMALYTE-A/ISOLYTE-S PH 7.4) IV solution, 100 mL/hr, Intravenous, Continuous, VERONIQUE Buck, Last Rate: 100 mL/hr at 12/25/24 0946, 100 mL/hr at 12/25/24 0946    NOREPINEPHRINE 4 MG  ML NSS (CMPD ORDER) infusion, 1-30 mcg/min, Intravenous, Titrated, Mich Key PA-C, Last Rate: 26.3 mL/hr at 12/25/24 0706, 7 mcg/min at 12/25/24 0706    ondansetron (ZOFRAN) injection 4 mg, 4 mg, Intravenous, Q6H PRN, Mich Key PA-C, 4 mg at 12/25/24 0108    pantoprazole (PROTONIX) injection 40 mg, 40 mg, Intravenous, Q24H SHARON, Mich Key PA-C, 40 mg at 12/25/24 0938    potassium chloride 40 mEq IVPB (premix), 40 mEq, Intravenous, Once,  "VERONIQUE Buck, Last Rate: 25 mL/hr at 12/25/24 0938, 40 mEq at 12/25/24 0938    propofol (DIPRIVAN) 1000 mg in 100 mL infusion (premix), 5-50 mcg/kg/min, Intravenous, Titrated, VERONIQUE Patterson, Last Rate: 7.7 mL/hr at 12/25/24 0536, 20 mcg/kg/min at 12/25/24 0536    vancomycin (VANCOCIN) IVPB (premix in dextrose) 1,000 mg 200 mL, 15 mg/kg, Intravenous, Daily PRN, VERONIQUE Patterson    vasopressin (PITRESSIN) 20 Units in sodium chloride 0.9 % 100 mL infusion, 0.04 Units/min, Intravenous, Continuous, Mich Key PA-C, Last Rate: 12 mL/hr at 12/25/24 0631, 0.04 Units/min at 12/25/24 0631      Lab Results: I have reviewed the following results:  Results from last 7 days   Lab Units 12/25/24  0843 12/25/24  0514 12/25/24  0051 12/24/24  1818 12/24/24  1135 12/24/24  0536 12/24/24  0000 12/23/24  2349 12/23/24  1700 12/23/24  1333   WBC Thousand/uL  --  2.27* 2.89*  --   --  11.56*  --   --   --  15.10*   HEMOGLOBIN g/dL  --  7.5* 9.2*  --   --  9.5*  --   --   --  12.1   HEMATOCRIT %  --  24.8* 31.6*  --   --  30.3*  --   --   --  37.1   PLATELETS Thousands/uL  --  260 357  --   --  348  --  398*  --  604*   POTASSIUM mmol/L 3.5 3.9 3.8 4.0 3.9 4.7 4.4  --    < > 5.5*   CHLORIDE mmol/L 100 102 98 88* 86* 82* 79*  --    < > 63*   CO2 mmol/L 23 22 13* 22 21 23 23  --    < > 24   BUN mg/dL 133* 143* 179* 188* 194* 199* 201*  --    < > 205*   CREATININE mg/dL 3.85* 3.96* 5.41* 5.81* 6.31* 6.97* 7.17*  --    < > 8.34*   CALCIUM mg/dL 7.5* 7.6* 7.1* 8.4 8.2* 7.8* 7.8*  --    < > 8.8   MAGNESIUM mg/dL  --  1.5*  --   --   --   --   --   --   --  2.2   PHOSPHORUS mg/dL  --  6.8*  --   --   --  12.6*  --   --   --   --    ALBUMIN g/dL  --  <1.5*  --   --   --   --   --   --   --  3.6    < > = values in this interval not displayed.       Administrative Statements     Portions of the record may have been created with voice recognition software. Occasional wrong word or \"sound a like\" " substitutions may have occurred due to the inherent limitations of voice recognition software. Read the chart carefully and recognize, using context, where substitutions have occurred.If you have any questions, please contact the dictating provider.

## 2024-12-25 NOTE — ASSESSMENT & PLAN NOTE
Renal function continue to improve.  Patient required urgent laparotomy because of perforation of the bowl    Intubated at this point and vasopressors    Does a good urine output

## 2024-12-25 NOTE — ASSESSMENT & PLAN NOTE
Lab Results   Component Value Date    HGBA1C 7.7 (A) 11/15/2024       Recent Labs     12/24/24  1120 12/24/24  1547 12/24/24 2038 12/24/24  2338   POCGLU 140 109 122 144*       Blood Sugar Average: Last 72 hrs:  (P) 172.75    Continue sliding scale insulin for now

## 2024-12-25 NOTE — UTILIZATION REVIEW
Date:   12/25  Day 3: Has surpassed a 2nd midnight with active treatments and services.    12:17  AM  Rapid  Response call  for  altered  mental status, hypotension  and tachycardia.  Poorly  responsive. Mottled  from feet to mid chest. Transferred to ICU.   Started  levophed, S/P 2 L  IVF.   Intubated at bedside.  Central and  A line inserted.      CT scan was performed  Which shows bowel perforation peritonitis free air ascites possible gallstone ileus    Shock, likely  D/T  bowel perforation.     Surgery  12/25  Procedure(s):  LAPAROTOMY EXPLORATORY, small bowel resection anastomosis ABTHERA WOUND VAC PLACEMENT    Operative Findings:  Proximal small bowel perforation diffuse fecal peritonitis  Nausea x 2 gallstones umbilical hernia and some adhesions between omentum and large bowel

## 2024-12-25 NOTE — ANESTHESIA POSTPROCEDURE EVALUATION
Post-Op Assessment Note    CV Status:  Stable  Pain Score: 0    Pain management: adequate       Mental Status:  Alert and awake   Hydration Status:  Euvolemic   PONV Controlled:  Controlled   Airway Patency:  Patent     Post Op Vitals Reviewed: Yes    No anethesia notable event occurred.    Staff: CRNA, Anesthesiologist   Comments: Transferred to ICu on monitored bed; report to ICU RN, PA        Last Filed PACU Vitals:  Vitals Value Taken Time   Temp     Pulse 113 12/25/24 0448   /34 12/25/24 0448   Resp 18 12/25/24 0448   SpO2 100 % 12/25/24 0448       Modified Lucina:  No data recorded

## 2024-12-25 NOTE — QUICK NOTE
Rapid response called after patient with acute change in mental status and patient now noted to be hypotensive with heart rate in the 130s up to 140.    Between being seen around 2110 and 2330, patient with acute change in mental status, appeared to be getting delirious and ripped out NG tube but was answering RN questions and could be redirected, seemed restless.  Placed on virtual one-to-one . then RN reporting he went to recheck vitals and replace NG tube , but patient appeared to be more tachypneic, not answering questions and noted to be hypotensive and tachycardic. NGT replaced and spears catheter still in place as pt was agreeable with keeping it in for strict I/O's.    See further treatment plan under rapid response note from critical care provider.  Patient being transferred to critical care unit for higher level of care, family to be notified once patient established in the ICU.

## 2024-12-25 NOTE — PROGRESS NOTES
Patient started c/o abdominal pain in th bed. Bladder scanned for 12 m l Begging staff to remove the spears. On call provider made aware and gave okay to remove spears due to discomfort and continue strict I&O. Evening Rn added to the conversation to be made aware.

## 2024-12-25 NOTE — RESPIRATORY THERAPY NOTE
Attending rapid response with Istat and ALEX, called for change in mental status/hypotension, pt presents with even but labored respirations, Spo2 on room air is 94%, ABG attempted but unable to obtain adequate sample size, will remain in attendance pending further intervention

## 2024-12-25 NOTE — QUICK NOTE
Notified by RN patient crying and pleading with staff to remove urinary Spears catheter due to severe pain.  Due to severe pain and patient requesting Spears removal will help alleviate pain and remove Spears catheter for now but advised nursing staff to adhere to strict I's and O's for now and urinary retention protocol in place.  Can attempt new Spears catheter later in the night if patient allows.    RN also reporting they went to give her p.o. as needed pain medication and patient vomited what appeared to be stool like emesis and not tolerating p.o meds.      Upon approach around 2110, patient seen lying uncomfortably in bed.  Reports she is having lower abdominal pain mostly from spears, but it does radiate throughout her whole abdomen.  Denies chills, shortness of breath.  On exam, abdomen nondistended, soft, absent bowel sounds.  Tenderness to palpation over periumbilical and hypogastric region, but no guarding or pain out of proportion.  Hands and feet warm, no cyanosis.  Right upper quadrant ultrasound and x-ray abdomen obstructive series reviewed    Due to persistent vomiting and concern for small bowel obstruction or gallbladder ileus we will continue n.p.o. as already ordered, but will also place NG tube.    Will switch p.o. as needed pain medication to IV at this time including IV Ofirmev and IV Dilaudid as needed for moderate to severe pain.  General surgery following with patient.

## 2024-12-25 NOTE — PROCEDURES
Arterial Line Insertion    Date/Time: 12/25/2024 2:15 AM    Performed by: VERONIQUE Patterson  Authorized by: VERONIQUE Patterson    Patient location:  Bedside and ICU  Consent:     Consent obtained:  Emergent situation  Universal protocol:     Immediately prior to procedure a time out was called: yes      Patient identity confirmed:  Arm band  Indications:     Indications: hemodynamic monitoring, multiple ABGs and continuous blood pressure monitoring    Pre-procedure details:     Skin preparation:  Chlorhexidine    Preparation: Patient was prepped and draped in sterile fashion    Anesthesia (see MAR for exact dosages):     Anesthesia method:  None  Procedure details:     Location / Tip of Catheter:  Radial    Laterality:  Right    Needle gauge:  20 G    Placement technique:  Percutaneous, Seldinger and ultrasound guided    Ultrasound image availability:  Not obtained due to urgency    Sterile ultrasound techniques: Sterile gel and sterile probe covers were used      Number of attempts:  2    Successful placement: yes      Transducer: waveform confirmed    Post-procedure details:     Post-procedure:  Secured with tape, sterile dressing applied and sutured    CMS:  Unable to assess

## 2024-12-25 NOTE — ASSESSMENT & PLAN NOTE
Continue aggressive volume resuscitation  Close intake and output  Complete 24 hours of albumin  Appreciate nephrology recommendations

## 2024-12-25 NOTE — ANESTHESIA PREPROCEDURE EVALUATION
Procedure:  LAPAROTOMY EXPLORATORY (Abdomen)    Admitted 12/23/24 with a few days of PO intolerance  Imaging shows gallstone ileus causing SBO  Acute perforation today with septic shock today  Required intubation, central line and arterial line placement    Levophed at 16 mcg/min  Vasopressin at 0.04 U/min  Fentanyl Drip Versed drip  Bicarbonate infusion to be started    Princess Miller, has cell phone that goes to [employer].  left with call back to ICU.  Princess Juarez - [phone]    Relevant Problems   CARDIO   (+) Hyperlipidemia associated with type 2 diabetes mellitus  (HCC)   (+) Hypertension associated with diabetes  (HCC)      ENDO   (+) Type 2 diabetes mellitus with microalbuminuria, without long-term current use of insulin (HCC)      GI/HEPATIC   (+) Small bowel obstruction (HCC)      /RENAL   (+) Acute kidney failure (HCC)      MUSCULOSKELETAL   (+) Gout      NEURO/PSYCH   (+) Chronic neck pain      PULMONARY   (+) Acute respiratory failure with hypoxia (HCC)      Neurology/Sleep   (+) Acute encephalopathy      Surgery/Wound/Pain   (+) Shock (HCC)      FEN/Gastrointestinal   (+) Bowel perforation (HCC)      Other   (+) High anion gap metabolic acidosis        Physical Exam    Airway    Mallampati score: already intubated         Dental       Cardiovascular      Pulmonary      Other Findings  post-pubertal.      Anesthesia Plan  ASA Score- 5 Emergent    Anesthesia Type- general with ASA Monitors.         Additional Monitors: arterial line and central venous line.    Airway Plan: ETT.           Plan Factors-Exercise tolerance (METS): >4 METS.    Chart reviewed. EKG reviewed. Imaging results reviewed. Existing labs reviewed. Patient summary reviewed.    Patient is not a current smoker.  Patient did not smoke on day of surgery.    Obstructive sleep apnea risk education given perioperatively.        Induction- intravenous.    Postoperative Plan-     Perioperative Resuscitation Plan - Level 1 - Full Code.        Informed Consent- Plan/risks discussed with: proceeding without consent due to life threatening nature of condition and need for emergency surgery.  I personally reviewed this patient with the CRNA. Discussed and agreed on the Anesthesia Plan with the CRNA..

## 2024-12-25 NOTE — RESPIRATORY THERAPY NOTE
RT Ventilator Management Note  Tanisha Morrow 84 y.o. female MRN: [id number]  Unit/Bed#: ICU 10 Encounter: [id number]      Daily Screen         12/25/2024  0736             Patient safety screen outcome:: Failed    Not Ready for Weaning due to:: Going on Transport intubated;FiO2 >60%              Physical Exam:   Assessment Type: Assess only  General Appearance: Sedated  Respiratory Pattern: Normal  Chest Assessment: Chest expansion symmetrical  Bilateral Breath Sounds: Clear  Suction: ET Tube  O2 Device: vent      Resp Comments: (S) RR decreased to 12       12/25/24 1801   Respiratory Assessment   Resp Comments (S)  RR decreased to 12   AC/VC+ Settings   Resp Rate (BPM) (S)  12 BPM   VT (mL) 340 mL   Insp Time (S) 0.85 S   FIO2 (%) 50 %   PEEP (cmH2O) 8 cmH2O   Rise Time (%) 20 %   Trigger Sensitivity Flow (LPM) 2 LPM   AC/VC+ Actuals   Resp Rate (BPM) 27 BPM   VT (mL) 396 mL   MV (Obs) 9.23   MAP (cmH2O) 13 cmH2O   Peak Pressure (cmH2O) 22 cmH2O   I:E Ratio (Obs) 1:2.7   Static Compliance (mL/cmH20) 11.5 mL/cmH2O

## 2024-12-25 NOTE — ASSESSMENT & PLAN NOTE
Likely related to bowel perforation  Continue vasopressors for goal MAP>65  Trend endpoints of resuscitation  Continue aggressive volume resuscitation

## 2024-12-26 NOTE — ANESTHESIA PREPROCEDURE EVALUATION
Procedure:  LAPAROTOMY EXPLORATORY, Possible Bowel Resection, Possible Ostomy, All Other Indicated Procedures (Abdomen)    Relevant Problems   CARDIO   (+) Hyperlipidemia associated with type 2 diabetes mellitus  (HCC)   (+) Hypertension associated with diabetes  (HCC)      ENDO   (+) Type 2 diabetes mellitus with microalbuminuria, without long-term current use of insulin (HCC)      GI/HEPATIC   (+) Gallstone ileus (HCC)      /RENAL   (+) Acute kidney failure (HCC)      MUSCULOSKELETAL   (+) Gout   (+) Torticollis      NEURO/PSYCH   (+) Chronic neck pain      PULMONARY   (+) Acute respiratory failure with hypoxia (HCC)        Physical Exam    Airway    Mallampati score: I  TM Distance: >3 FB  Neck ROM: full     Dental       Cardiovascular  Cardiovascular exam normal    Pulmonary  Pulmonary exam normal     Other Findings  post-pubertal.      Anesthesia Plan  ASA Score- 4     Anesthesia Type- general with ASA Monitors.         Additional Monitors: arterial line and central venous line.    Airway Plan: ETT.           Plan Factors-    Induction-     Postoperative Plan-     Perioperative Resuscitation Plan - Level 1 - Full Code.       Informed Consent- Anesthetic plan and risks discussed with healthcare power of .  I personally reviewed this patient with the CRNA. Discussed and agreed on the Anesthesia Plan with the CRNA..

## 2024-12-26 NOTE — ASSESSMENT & PLAN NOTE
Renal function was stabilized at this point with fluctuating urine output.  Patient remains acidotic.  Will be going to OR for  closure of the abdominal wall.  May need CRRT if kidney function continues stabilized or does not get any better

## 2024-12-26 NOTE — QUICK NOTE
Patient noted to have an elevated lactate this morning along with a pressor requirement.  Patient was previously weaned off pressors.  Plan for exploratory laparotomy, possible bowel resection, possible ostomy, all other indicated procedures.  All risks, benefits, alternatives of the procedure were discussed at length with the patient's next of kin.  Risks include bleeding, infection, damage to surrounding structures, anastomotic breakdown, need for additional surgery.  All questions were answered to satisfaction.  The patient's next of kin voiced understanding and consent was obtained over the phone.

## 2024-12-26 NOTE — ASSESSMENT & PLAN NOTE
Anticipate clearance on CRRT  Worsening despite CRRT, consider sodium bicarbonate infusion  Trend BMP per protocol  Appreciate nephrology recommendations

## 2024-12-26 NOTE — ASSESSMENT & PLAN NOTE
Day 3 of antibiotics, presently on cefepime/Flagyl/vancomycin  Follow culture results, temperature, white count  Await speciation of body fluid culture  Patient MRSA positive

## 2024-12-26 NOTE — PLAN OF CARE
Problem: PAIN - ADULT  Goal: Verbalizes/displays adequate comfort level or baseline comfort level  Description: Interventions:  - Encourage patient to monitor pain and request assistance  - Assess pain using appropriate pain scale  - Administer analgesics based on type and severity of pain and evaluate response  - Implement non-pharmacological measures as appropriate and evaluate response  - Consider cultural and social influences on pain and pain management  - Notify physician/advanced practitioner if interventions unsuccessful or patient reports new pain  Outcome: Progressing     Problem: INFECTION - ADULT  Goal: Absence or prevention of progression during hospitalization  Description: INTERVENTIONS:  - Assess and monitor for signs and symptoms of infection  - Monitor lab/diagnostic results  - Monitor all insertion sites, i.e. indwelling lines, tubes, and drains  - Monitor endotracheal if appropriate and nasal secretions for changes in amount and color  - Superior appropriate cooling/warming therapies per order  - Administer medications as ordered  - Instruct and encourage patient and family to use good hand hygiene technique  - Identify and instruct in appropriate isolation precautions for identified infection/condition  Outcome: Progressing  Goal: Absence of fever/infection during neutropenic period  Description: INTERVENTIONS:  - Monitor WBC    Outcome: Progressing     Problem: SAFETY ADULT  Goal: Patient will remain free of falls  Description: INTERVENTIONS:  - Educate patient/family on patient safety including physical limitations  - Instruct patient to call for assistance with activity   - Consult OT/PT to assist with strengthening/mobility   - Keep Call bell within reach  - Keep bed low and locked with side rails adjusted as appropriate  - Keep care items and personal belongings within reach  - Initiate and maintain comfort rounds  - Make Fall Risk Sign visible to staff  - Offer Toileting every  Hours,  in advance of need  - Initiate/Maintain alarm  - Obtain necessary fall risk management equipment:   - Apply yellow socks and bracelet for high fall risk patients  - Consider moving patient to room near nurses station  Outcome: Progressing  Goal: Maintain or return to baseline ADL function  Description: INTERVENTIONS:  -  Assess patient's ability to carry out ADLs; assess patient's baseline for ADL function and identify physical deficits which impact ability to perform ADLs (bathing, care of mouth/teeth, toileting, grooming, dressing, etc.)  - Assess/evaluate cause of self-care deficits   - Assess range of motion  - Assess patient's mobility; develop plan if impaired  - Assess patient's need for assistive devices and provide as appropriate  - Encourage maximum independence but intervene and supervise when necessary  - Involve family in performance of ADLs  - Assess for home care needs following discharge   - Consider OT consult to assist with ADL evaluation and planning for discharge  - Provide patient education as appropriate  Outcome: Progressing  Goal: Maintains/Returns to pre admission functional level  Description: INTERVENTIONS:  - Perform AM-PAC 6 Click Basic Mobility/ Daily Activity assessment daily.  - Set and communicate daily mobility goal to care team and patient/family/caregiver.   - Collaborate with rehabilitation services on mobility goals if consulted  - Perform Range of Motion  times a day.  - Reposition patient every  hours.  - Dangle patient  times a day  - Stand patient  times a day  - Ambulate patient  times a day  - Out of bed to chair  times a day   - Out of bed for meals  times a day  - Out of bed for toileting  - Record patient progress and toleration of activity level   Outcome: Progressing     Problem: DISCHARGE PLANNING  Goal: Discharge to home or other facility with appropriate resources  Description: INTERVENTIONS:  - Identify barriers to discharge w/patient and caregiver  - Arrange for  needed discharge resources and transportation as appropriate  - Identify discharge learning needs (meds, wound care, etc.)  - Arrange for interpretive services to assist at discharge as needed  - Refer to Case Management Department for coordinating discharge planning if the patient needs post-hospital services based on physician/advanced practitioner order or complex needs related to functional status, cognitive ability, or social support system  Outcome: Progressing     Problem: Knowledge Deficit  Goal: Patient/family/caregiver demonstrates understanding of disease process, treatment plan, medications, and discharge instructions  Description: Complete learning assessment and assess knowledge base.  Interventions:  - Provide teaching at level of understanding  - Provide teaching via preferred learning methods  Outcome: Progressing     Problem: Nutrition/Hydration-ADULT  Goal: Nutrient/Hydration intake appropriate for improving, restoring or maintaining nutritional needs  Description: Monitor and assess patient's nutrition/hydration status for malnutrition. Collaborate with interdisciplinary team and initiate plan and interventions as ordered.  Monitor patient's weight and dietary intake as ordered or per policy. Utilize nutrition screening tool and intervene as necessary. Determine patient's food preferences and provide high-protein, high-caloric foods as appropriate.     INTERVENTIONS:  - Monitor oral intake, urinary output, labs, and treatment plans  - Assess nutrition and hydration status and recommend course of action  - Evaluate amount of meals eaten  - Assist patient with eating if necessary   - Allow adequate time for meals  - Recommend/ encourage appropriate diets, oral nutritional supplements, and vitamin/mineral supplements  - Order, calculate, and assess calorie counts as needed  - Recommend, monitor, and adjust tube feedings and TPN/PPN based on assessed needs  - Assess need for intravenous fluids  -  Provide specific nutrition/hydration education as appropriate  - Include patient/family/caregiver in decisions related to nutrition  Outcome: Progressing     Problem: MOBILITY - ADULT  Goal: Maintain or return to baseline ADL function  Description: INTERVENTIONS:  -  Assess patient's ability to carry out ADLs; assess patient's baseline for ADL function and identify physical deficits which impact ability to perform ADLs (bathing, care of mouth/teeth, toileting, grooming, dressing, etc.)  - Assess/evaluate cause of self-care deficits   - Assess range of motion  - Assess patient's mobility; develop plan if impaired  - Assess patient's need for assistive devices and provide as appropriate  - Encourage maximum independence but intervene and supervise when necessary  - Involve family in performance of ADLs  - Assess for home care needs following discharge   - Consider OT consult to assist with ADL evaluation and planning for discharge  - Provide patient education as appropriate  Outcome: Progressing  Goal: Maintains/Returns to pre admission functional level  Description: INTERVENTIONS:  - Perform AM-PAC 6 Click Basic Mobility/ Daily Activity assessment daily.  - Set and communicate daily mobility goal to care team and patient/family/caregiver.   - Collaborate with rehabilitation services on mobility goals if consulted  - Perform Range of Motion  times a day.  - Reposition patient every  hours.  - Dangle patient  times a day  - Stand patient  times a day  - Ambulate patient  times a day  - Out of bed to chair  times a day   - Out of bed for meals times a day  - Out of bed for toileting  - Record patient progress and toleration of activity level   Outcome: Progressing     Problem: SAFETY,RESTRAINT: NV/NON-SELF DESTRUCTIVE BEHAVIOR  Goal: Remains free of harm/injury (restraint for non violent/non self-detsructive behavior)  Description: INTERVENTIONS:  - Instruct patient/family regarding restraint use   - Assess and monitor  physiologic and psychological status   - Provide interventions and comfort measures to meet assessed patient needs   - Identify and implement measures to help patient regain control  - Assess readiness for release of restraint   Outcome: Progressing  Goal: Returns to optimal restraint-free functioning  Description: INTERVENTIONS:  - Assess the patient's behavior and symptoms that indicate continued need for restraint  - Identify and implement measures to help patient regain control  - Assess readiness for release of restraint   Outcome: Progressing

## 2024-12-26 NOTE — PROGRESS NOTES
Progress Note - Critical Care/ICU   Name: Tanisha Morrow 84 y.o. female I MRN: [id number]  Unit/Bed#: ICU 10 I Date of Admission: 12/23/2024   Date of Service: 12/26/2024 I Hospital Day: 3      Assessment & Plan  Shock (HCC)  Likely related to bowel perforation  Continue vasopressors for goal MAP>65  Trend endpoints of resuscitation  Continue aggressive volume resuscitation  Type 2 diabetes mellitus with microalbuminuria, without long-term current use of insulin (HCC)  Lab Results   Component Value Date    HGBA1C 7.7 (A) 11/15/2024       Recent Labs     12/25/24  1104 12/25/24  1711 12/25/24  2322 12/26/24  0012   POCGLU 241* 146* 90 95       Blood Sugar Average: Last 72 hrs:  (P) [id number]    Continue sliding scale insulin for now  Severe sepsis (HCC)  Day 2 of antibiotics, presently on cefepime/Flagyl/vancomycin  Follow culture results, temperature, white count  Await speciation of body fluid culture  Acute kidney failure (HCC)  Improving following IVF  Continue aggressive volume resuscitation  Trend renal parameters closely  High anion gap metabolic acidosis  Continue aggressive volume resuscitation  Close intake and output  Complete 24 hours of albumin  Appreciate nephrology recommendations  Acute encephalopathy  Likely related to acuity of condition  Continue fentanyl and propofol for ventilatory comfort  Frequent neurologic exmas  Bowel perforation (HCC)  Secondary to calcified gallstone  Discuss with general surgery management for gallbladder  POD #1 from ex-lap with small bowel resection, anastomosis, and Abthera placement  Return to OR per general surgery  Maintain NGT tube for now  NPO- discuss early initiation of TPN  Continue IV Protonix  Acute respiratory failure with hypoxia (HCC)  Intubated on 12/25  Continue propofol/fentanyl for comfort while mechanically ventilated  Respiratory protocol  SBT when abdomen closed  Gallstone ileus (HCC)  Discuss management of gallbladder with general  surgery  Disposition: Critical care    ICU Core Measures     Vented Patient  VAP Bundle  VAP bundle ordered     A: Assess, Prevent, and Manage Pain Has pain been assessed? Yes  Need for changes to pain regimen? No   B: Both Spontaneous Awakening Trials (SATs) and Spontaneous Breathing Trials (SBTs) Plan to perform spontaneous awakening trial today? No secondary to open chest/abdomen  Plan to perform spontaneous breathing trial today? No secondary to open chest/abdomen  Obvious barriers to extubation? Yes   C: Choice of Sedation RASS Goal: -2 Light Sedation or -1 Drowsy  Need for changes to sedation or analgesia regimen? No   D: Delirium CAM-ICU: Unable to perform secondary to Acute cognitive dysfunction   E: Early Mobility  Plan for early mobility? Yes   F: Family Engagement Plan for family engagement today? Yes       Antibiotic Review: Awaiting culture results.     Review of Invasive Devices:    Manisha Plan: Continue for accurate I/O monitoring for 48 hours  Central access plan: Medications requiring central line  Caron Plan: Keep arterial line for hemodynamic monitoring    Prophylaxis:  VTE VTE covered by:  heparin (porcine), Subcutaneous, 5,000 Units at 12/25/24 2111       Stress Ulcer  covered bypantoprazole (PROTONIX) injection 40 mg [[id number]]         24 Hour Events : Patient's sedation decreased, started on scheduled albumin, received 1L IVF overnight with improvement in heart rate/pressor need  Subjective   Review of Systems: Review of Systems   Unable to perform ROS: Intubated       Objective :                   Vitals I/O      Most Recent Min/Max in 24hrs   Temp 99.5 °F (37.5 °C) Temp  Min: 97.5 °F (36.4 °C)  Max: 101.1 °F (38.4 °C)   Pulse (!) 113 Pulse  Min: 73  Max: 132   Resp (!) 23 Resp  Min: 18  Max: 41   /59 BP  Min: 83/47  Max: 176/65   O2 Sat 100 % SpO2  Min: 57 %  Max: 100 %      Intake/Output Summary (Last 24 hours) at 12/26/2024 0034  Last data filed at 12/26/2024 0000  Gross per 24 hour    Intake 6749.62 ml   Output 2430 ml   Net 4319.62 ml       Diet NPO    Invasive Monitoring           Physical Exam   Physical Exam  Eyes:      Pupils: Pupils are equal, round, and reactive to light.   Skin:     General: Skin is warm and dry.      Coloration: Skin is pale.   HENT:      Head: Normocephalic and atraumatic.      Nose: Nasogastric tube present.      Mouth/Throat:      Mouth: Mucous membranes are dry.   Cardiovascular:      Rate and Rhythm: Regular rhythm. Tachycardia present.      Pulses: Normal pulses.   Musculoskeletal:         General: No deformity or signs of injury.      Right lower leg: No edema.      Left lower leg: No edema.   Abdominal:      Palpations: Abdomen is soft.      Comments: Right JPs in place with serous drainage  Left RAVINDRA with slightly bilious drainage  Wound vac in place with serobilious drainage   Constitutional:       General: She is not in acute distress.     Appearance: She is ill-appearing.      Interventions: She is sedated, intubated and restrained.   Pulmonary:      Effort: Pulmonary effort is normal. She is intubated.      Breath sounds: Normal breath sounds.      Comments: Scant clear secretions inline suction  Neurological:      GCS: GCS eye subscore is 2. GCS verbal subscore is 1. GCS motor subscore is 3.      Comments: Flexion in all 4 extremities to noxious stimulus  Exam performed on fentanyl   Genitourinary/Anorectal:     Comments: Dyer in place with moderate amount of yellow urine  Dyer present.        Diagnostic Studies        Lab Results: I have reviewed the following results:     Medications:  Scheduled PRN   acetaminophen, 1,000 mg, Q8H  cefepime, 1,000 mg, Q24H  chlorhexidine, 15 mL, Q12H SHARON  heparin (porcine), 5,000 Units, Q8H SHARON  insulin lispro, 1-5 Units, Q6H SHARON  metroNIDAZOLE, 500 mg, Q8H  pantoprazole, 40 mg, Q24H SHARON      fentaNYL, 50 mcg, Q1H PRN  ondansetron, 4 mg, Q6H PRN  vancomycin, 15 mg/kg, Daily PRN       Continuous    fentaNYL, 25 mcg/hr,  Last Rate: 25 mcg/hr (12/26/24 0000)  multi-electrolyte, 100 mL/hr, Last Rate: Stopped (12/25/24 2000)  norepinephrine, 1-30 mcg/min, Last Rate: Stopped (12/25/24 2334)  propofol, 5-50 mcg/kg/min, Last Rate: Stopped (12/25/24 1300)  vasopressin, 0.04 Units/min, Last Rate: 0.04 Units/min (12/26/24 0000)         Labs:   CBC    Recent Labs     12/25/24 0051 12/25/24  0514   WBC 2.89* 2.27*   HGB 9.2* 7.5*   HCT 31.6* 24.8*    260   BANDSPCT 24*  --      BMP    Recent Labs     12/25/24  0514 12/25/24  0843   SODIUM 142 141   K 3.9 3.5    100   CO2 22 23   AGAP 18* 18*   * 133*   CREATININE 3.96* 3.85*   CALCIUM 7.6* 7.5*       Coags    Recent Labs     12/25/24  0051   INR 1.24*        Additional Electrolytes  Recent Labs     12/24/24  0536 12/25/24  0514   MG  --  1.5*   PHOS 12.6* 6.8*          Blood Gas    Recent Labs     12/25/24  1706   PHART 7.568*   XWL1KVK 23.3*   PO2ART 148.6*   NQL6BCO 20.8*   BEART -0.9   SOURCE Line, Arterial     Recent Labs     12/25/24  0051 12/25/24  0243 12/25/24  1706   PHVEN 7.254*  --   --    LOI8KIF 28.5*  --   --    PO2VEN 48.7*  --   --    OAJ0QGW 12.3*  --   --    BEVEN -13.5  --   --    K4HFFIL 70.8  --   --    SOURCE  --    < > Line, Arterial    < > = values in this interval not displayed.    LFTs  Recent Labs     12/25/24  0514   ALT 11   AST 23   ALKPHOS 31*   ALB <1.5*   TBILI 0.59       Infectious  Recent Labs     12/25/24  0051   PROCALCITONI 23.11*     Glucose  Recent Labs     12/24/24  1818 12/25/24  0051 12/25/24  0514 12/25/24  0843   GLUC 132 128 198* 271*

## 2024-12-26 NOTE — RESPIRATORY THERAPY NOTE
RT Ventilator Management Note  Tainsha Morrow 84 y.o. female MRN: [id number]  Unit/Bed#: ICU 10 Encounter: [id number]      Daily Screen         12/25/2024 1936 12/26/2024 0037          Patient safety screen outcome:: Failed Failed (P)       Not Ready for Weaning due to:: Going on Transport intubated;Underline problem not resolved Going on Transport intubated (P)                 Physical Exam:   Assessment Type: (P) Assess only  General Appearance: (P) Sedated  Respiratory Pattern: (P) Assisted  Chest Assessment: (P) Chest expansion symmetrical  Bilateral Breath Sounds: (P) Clear, Diminished  R Breath Sounds: Clear  Suction: ET Tube  O2 Device: (P) vent      Resp Comments: (P) no changes made to the patient's vent settings     12/26/24 0037   Respiratory Assessment   Assessment Type Assess only   General Appearance Sedated   Respiratory Pattern Assisted   Chest Assessment Chest expansion symmetrical   Bilateral Breath Sounds Clear;Diminished   Resp Comments no changes made to the patient's vent settings   O2 Device vent   Vent Information   Vent ID Satnam   Vent type Drager   Drager Vent Mode AC/VC+   $ Pulse Oximetry Spot Check Charge Completed   AC/VC+ Settings   Resp Rate (BPM) 12 BPM   VT (mL) 340 mL   Insp Time (S) 0.85 S   FIO2 (%) 50 %   PEEP (cmH2O) 8 cmH2O   Rise Time (%) 20 %   Trigger Sensitivity Flow (LPM) 2 LPM   Humidification Heater   Heater Temp 98.6 °F (37 °C)   AC/VC+ Actuals   Resp Rate (BPM) 21 BPM   VT (mL) 332 mL   MV (Obs) 6.81   MAP (cmH2O) 12 cmH2O   Peak Pressure (cmH2O) 23 cmH2O   I:E Ratio (Obs) 1:2.5   Static Compliance (mL/cmH20) 21.3 mL/cmH2O   Plateau Pressure (cm H2O) 19.8 cm H2O   Heater Temperature (Obs) 95.5 °F (35.3 °C)   AC/VC+ ALARMS   High Peak Pressure (cmH2O) 40 cmH2O   High Resp Rate (BPM) 40 BPM   High MV (L/min) 15 L/min   Low MV (L/min) 3.5 L/min   High VT (mL) 750 mL   Maintenance   Alarm (pink) cable attached Yes   Resuscitation bag with peep valve at bedside Yes    Water bag changed No   Circuit changed No   Daily Screen   Patient safety screen outcome: Failed   Not Ready for Weaning due to: Going on Transport intubated   IHI Ventilator Associated Pneumonia Bundle   Daily Assessment of Readiness to Extubate Yes   Head of Bed Elevated HOB 30   ETT  Oral 8 mm   Placement Date: 12/25/24   Preoxygenated: Yes  Type: Oral  Tube Size: 8 mm  Insertion: Atraumatic  Insertion attempts: 1  Placement Verification: Auscultation;End tidal CO2;Symmetrical chest wall movement  Secured at (cm): 23   Secured at (cm) 23   Measured from Lips   Secured Location Right   Repositioned Center to Right   Secured by Commercial tube rice   Site Condition Dry   Cuff Pressure (color) Green   HI-LO Suction  Continuous low suction   HI-LO Secretions Scant   HI-LO Intervention Patent

## 2024-12-26 NOTE — ANESTHESIA POSTPROCEDURE EVALUATION
Post-Op Assessment Note    CV Status:  Stable    Pain management: adequate       Mental Status:  Unresponsive   Hydration Status:  Euvolemic   PONV Controlled:  Controlled   Airway Patency:  Patent  Airway: intubated     Post Op Vitals Reviewed: Yes    No anethesia notable event occurred.    Staff: Anesthesiologist, CRNA           Last Filed PACU Vitals:  Vitals Value Taken Time   Temp 95.9 °F (35.5 °C) 12/26/24 1057   Pulse 102 12/26/24 1057   /55    Resp 22 12/26/24 1056   SpO2 95 % 12/26/24 1056   Vitals shown include unfiled device data.    Patient transported to ICU on portable monitor-- VSS, ETT in place, bedside report given to RN.

## 2024-12-26 NOTE — ANESTHESIA POSTPROCEDURE EVALUATION
Post-Op Assessment Note    CV Status:  Stable    Pain management: adequate    Multimodal analgesia used between 6 hours prior to anesthesia start to PACU discharge    Post-procedure mental status: Sedated.  Airway: intubated     Post Op Vitals Reviewed: Yes    No anethesia notable event occurred.    Comments: Direct to ICU          Last Filed PACU Vitals:  Vitals Value Taken Time   Temp 96.08 °F (35.6 °C) 12/26/24 1442   Pulse 92 12/26/24 1442   /56 12/26/24 1433   Resp 0 12/26/24 1436   SpO2 97 % 12/26/24 1439   Vitals shown include unfiled device data.    Modified Lucina:  No data recorded

## 2024-12-26 NOTE — RESPIRATORY THERAPY NOTE
RT Ventilator Management Note  Tanisha Morrow 84 y.o. female MRN: [id number]  Unit/Bed#: ICU 10 Encounter: [id number]      Daily Screen         12/25/2024 0736 12/25/2024 1936          Patient safety screen outcome:: Failed Failed      Not Ready for Weaning due to:: Going on Transport intubated;FiO2 >60% Going on Transport intubated;Underline problem not resolved                Physical Exam:   Assessment Type: (P) Assess only  General Appearance: (P) Sedated  Respiratory Pattern: (P) Assisted  Chest Assessment: (P) Chest expansion symmetrical  Bilateral Breath Sounds: (P) Clear, Diminished  R Breath Sounds: (P) Clear  Suction: ET Tube  O2 Device: (P) vent      Resp Comments: (P) pt remains intubated on full mechanical support,     12/25/24 1936   Respiratory Assessment   Assessment Type Assess only   General Appearance Sedated   Respiratory Pattern Assisted   Chest Assessment Chest expansion symmetrical   Bilateral Breath Sounds Clear;Diminished   R Breath Sounds Clear   Resp Comments pt remains intubated on full mechanical support,   O2 Device vent   Vent Information   Vent ID Satnam   Vent type Drager   Drager Vent Mode AC/VC+   $ Pulse Oximetry Spot Check Charge Completed   SpO2 91 %   AC/VC+ Settings   Resp Rate (BPM) 12 BPM   VT (mL) 340 mL   Insp Time (S) 0.85 S   FIO2 (%) 50 %   PEEP (cmH2O) 8 cmH2O   Rise Time (%) 20 %   Trigger Sensitivity Flow (LPM) 2 LPM   Humidification Heater   Heater Temp 98.6 °F (37 °C)   AC/VC+ Actuals   Resp Rate (BPM) 24 BPM   VT (mL) 330 mL   MV (Obs) 8   MAP (cmH2O) 13 cmH2O   Peak Pressure (cmH2O) 12 cmH2O   I:E Ratio (Obs) 1:2   Static Compliance (mL/cmH20) 17.7 mL/cmH2O   Plateau Pressure (cm H2O) 21.5 cm H2O   Heater Temperature (Obs) 98.2 °F (36.8 °C)   AC/VC+ ALARMS   High Peak Pressure (cmH2O) 40 cmH2O   High Resp Rate (BPM) 40 BPM   High MV (L/min) 15 L/min   Low MV (L/min) 3.5 L/min   High VT (mL) 750 mL   Maintenance   Alarm (pink) cable attached Yes   Resuscitation  bag with peep valve at bedside Yes   Water bag changed No   Circuit changed No   Daily Screen   Patient safety screen outcome: Failed   Not Ready for Weaning due to: Going on Transport intubated;Underline problem not resolved   IHI Ventilator Associated Pneumonia Bundle   Daily Assessment of Readiness to Extubate Yes   Head of Bed Elevated HOB 30   ETT  Oral 8 mm   Placement Date: 12/25/24   Preoxygenated: Yes  Type: Oral  Tube Size: 8 mm  Insertion: Atraumatic  Insertion attempts: 1  Placement Verification: Auscultation;End tidal CO2;Symmetrical chest wall movement  Secured at (cm): 23   Secured at (cm) 23   Measured from Lips   Secured Location Center   Repositioned Right to Center   Secured by Commercial tube rice   Site Condition Dry   Cuff Pressure (color) Green   HI-LO Suction  Continuous low suction   HI-LO Secretions Scant   HI-LO Intervention Patent

## 2024-12-26 NOTE — PROGRESS NOTES
Tanisha Morrow is a 84 y.o. female who is currently ordered Vancomycin IV with management by the Pharmacy Consult service.  Relevant clinical data and objective / subjective history reviewed.  Vancomycin Assessment:  Indication and Goal AUC/Trough: Other, Intra-abdominal, -600, trough >10  Clinical Status:  stable  Micro:   Culture in progress  Renal Function:  SCr: 3.84 mg/dL  CrCl: 10.2 mL/min  Renal replacement: Not on dialysis  Days of Therapy: 2  Current Dose: 1000mg IV daily prn for random vancomycin level of 15mcg/ml or less  Vancomycin Plan:  New Dosinmg IV given 24 for random vancomycin level of 13.5  Pulse Dosing  Next Level: 24 AM labs  Renal Function Monitoring: Daily BMP and UOP  Pharmacy will continue to follow closely for s/sx of nephrotoxicity, infusion reactions and appropriateness of therapy.  BMP and CBC will be ordered per protocol. We will continue to follow the patient’s culture results and clinical progress daily.    Tara Haines, Pharmacist

## 2024-12-26 NOTE — ASSESSMENT & PLAN NOTE
Patient initiated on CRRT on 12/26  Currently running -50 mL/hr  Appreciate nephrology recommendations  Close intake and output

## 2024-12-26 NOTE — PROCEDURES
Temporary HD Catheter    Date/Time: 12/26/2024 1:40 PM    Performed by: VERONIQUE Buck  Authorized by: VERONIQUE Buck    Patient location:  Bedside  Other Assisting Provider: No    Consent:     Consent obtained:  Verbal    Consent given by:  Healthcare agent (Melvina Juarez (Paul))    Alternatives discussed:  No treatment, delayed treatment and observation  Universal protocol:     Procedure explained and questions answered to patient or proxy's satisfaction: yes      Relevant documents present and verified: yes      Radiology Images displayed and confirmed.  If images not available, report reviewed: yes      Required blood products, implants, devices, and special equipment available: yes      Site/side marked: yes      Immediately prior to procedure, a time out was called: yes      Patient identity confirmed:  Arm band  Pre-procedure details:     Hand hygiene: Hand hygiene performed prior to insertion      Sterile barrier technique: All elements of maximal sterile technique followed      Skin preparation:  ChloraPrep    Skin preparation agent: Skin preparation agent completely dried prior to procedure    Indications:     Central line indications: dialysis    Anesthesia (see MAR for exact dosages):     Anesthesia method:  Local infiltration    Local anesthetic:  Lidocaine 1% w/o epi  Procedure details:     Location:  Right internal jugular    Vessel type: vein      Laterality:  Right    Approach: percutaneous technique used      Patient position:  Trendelenburg    Catheter type:  Double lumen    Catheter size:  14 Fr    Catheter length:  15 cm    Landmarks identified: yes      Ultrasound guidance: yes      Ultrasound image availability:  Images available in PACS    Sterile ultrasound techniques: Sterile gel and sterile probe covers were used      Number of attempts:  1    Successful placement: yes  Vessel of catheter tip end: atriocaval junction  Post-procedure details:     Post-procedure:   Dressing applied and line sutured    Assessment:  Blood return through all ports and no pneumothorax on x-ray    Post-procedure complications: none      Patient tolerance of procedure:  Tolerated well, no immediate complications    Observer: Yes      Observer name:  Candace Corrigan RN

## 2024-12-26 NOTE — RESPIRATORY THERAPY NOTE
RT Ventilator Management Note  Tanisha Morrow 84 y.o. female MRN: [id number]  Unit/Bed#: ICU 10 Encounter: [id number]      Daily Screen         12/26/2024  0432 12/26/2024  0805          Patient safety screen outcome:: Failed Failed      Not Ready for Weaning due to:: Going on Transport intubated;Underline problem not resolved Going on Transport intubated                Physical Exam:   Assessment Type: Assess only  General Appearance: Sedated  Respiratory Pattern: Assisted  Chest Assessment: Chest expansion symmetrical  Bilateral Breath Sounds: Clear, Diminished  Suction: ET Tube  O2 Device: vent      Resp Comments: will cont with current settings, plan to close abdomin in OR tomorrow       12/26/24 1433   Respiratory Assessment   Assessment Type Assess only   General Appearance Sedated   Respiratory Pattern Assisted   Chest Assessment Chest expansion symmetrical   Bilateral Breath Sounds Clear;Diminished   Suction ET Tube   Resp Comments will cont with current settings, plan to close abdomin in OR tomorrow   O2 Device vent   Vent Information   Vent ID brijesh   Vent type Drager   Drager Vent Mode AC/VC+   $ Pulse Oximetry Spot Check Charge Completed   SpO2 96 %   AC/VC+ Settings   Resp Rate (BPM) 12 BPM   VT (mL) 340 mL   Insp Time (S) 0.85 S   FIO2 (%) 40 %   PEEP (cmH2O) 8 cmH2O   Rise Time (%) 20 %   Trigger Sensitivity Flow (LPM) 2 LPM   Humidification Heater   Heater Temp 98.6 °F (37 °C)   AC/VC+ Actuals   Resp Rate (BPM) 17 BPM   VT (mL) 341 mL   MV (Obs) 5.49   MAP (cmH2O) 11 cmH2O   Peak Pressure (cmH2O) 22 cmH2O   I:E Ratio (Obs) 1:3   Static Compliance (mL/cmH20) 24.1 mL/cmH2O   Plateau Pressure (cm H2O) 22.1 cm H2O   Heater Temperature (Obs) 98.6 °F (37 °C)   AC/VC+ ALARMS   High Peak Pressure (cmH2O) 40 cmH2O   High Resp Rate (BPM) 40 BPM   High MV (L/min) 15 L/min   Low MV (L/min) 3.5 L/min   High VT (mL) 750 mL   Maintenance   Alarm (pink) cable attached Yes   Resuscitation bag with peep valve at  bedside Yes   Water bag changed No   Circuit changed No   IHI Ventilator Associated Pneumonia Bundle   Daily Awakening Trials Performed Yes   Daily Assessment of Readiness to Extubate Yes   Head of Bed Elevated HOB 30   ETT  Oral 8 mm   Placement Date: 12/25/24   Preoxygenated: Yes  Type: Oral  Tube Size: 8 mm  Insertion: Atraumatic  Insertion attempts: 1  Placement Verification: Auscultation;End tidal CO2;Symmetrical chest wall movement  Secured at (cm): 23   Secured at (cm) 23   Measured from Lips   Secured Location (S)  Left   Repositioned (S)  Center to Left   Secured by Commercial tube rice   Site Condition Dry   Cuff Pressure (color) Green   HI-LO Suction  Continuous low suction   HI-LO Secretions Scant   HI-LO Intervention Patent

## 2024-12-26 NOTE — ASSESSMENT & PLAN NOTE
Lab Results   Component Value Date    HGBA1C 7.7 (A) 11/15/2024   Being managed by Slim is slowly improving    Recent Labs     12/25/24  2322 12/26/24  0012 12/26/24  0802 12/26/24  1116   POCGLU 90 95 164* 123       Blood Sugar Average: Last 72 hrs:  (P) [id number]

## 2024-12-26 NOTE — ASSESSMENT & PLAN NOTE
Secondary to calcified gallstone  Discuss with general surgery management for gallbladder  POD #2 from ex-lap with small bowel resection, anastomosis, and Abthera placement  POD #1 from ex-lap with abdominal washout, partial omentectomy, debridement of abdominal wall, abthera placement  Return to OR per general surgery  Maintain NGT tube for now  NPO- discuss early initiation of TPN  Continue IV Protonix

## 2024-12-26 NOTE — PROGRESS NOTES
Progress Note - Nephrology   Name: Tanisha Morrow 84 y.o. female I MRN: [id number]  Unit/Bed#: ICU 10 I Date of Admission: 12/23/2024   Date of Service: 12/26/2024 I Hospital Day: 3    Assessment & Plan  Acute kidney failure (HCC)  Renal function was stabilized at this point with fluctuating urine output.  Patient remains acidotic.  Will be going to OR for  closure of the abdominal wall.  May need CRRT if kidney function continues stabilized or does not get any better  Type 2 diabetes mellitus with microalbuminuria, without long-term current use of insulin (HCC)  Lab Results   Component Value Date    HGBA1C 7.7 (A) 11/15/2024   Being managed by Slim is slowly improving    Recent Labs     12/25/24  2322 12/26/24  0012 12/26/24  0802 12/26/24  1116   POCGLU 90 95 164* 123       Blood Sugar Average: Last 72 hrs:  (P) [id number]    Severe sepsis (HCC)  Likely to be intra-abdominal source.  On antibiotic for now  High anion gap metabolic acidosis  Not getting any better.  Will continue to monitor.  As a mention above may need CRRT  Shock (HCC)  Due to sepsis  Bowel perforation (HCC)  Requiring urgent surgery likely because of bowl perforation  Acute respiratory failure with hypoxia (HCC)  Intubated at this point  Overall prognosis guarded      I have reviewed the nephrology recommendations including possible need for dialysis, with ICU, and we are in agreement with renal plan including the information outlined above.     Subjective   Brief History of Admission -abdominal pain and acute kidney injury    Remain intubated    Will be going for OR    Urine output is stable and depends on IV fluid    Still hemodynamic unstable        Objective :  Temp:  [95.9 °F (35.5 °C)-101.1 °F (38.4 °C)] 95.9 °F (35.5 °C)  HR:  [] 101  BP: ()/(45-73) 108/57  Resp:  [20-28] 20  SpO2:  [86 %-100 %] 95 %  O2 Device: Other (comment)  FiO2 (%):  [40-50] 40    Current Weight: Weight - Scale: 64 kg (141 lb)  First Weight:  Weight - Scale: 64.3 kg (141 lb 12.1 oz)  I/O         12/24 0701  12/25 0700 12/25 0701  12/26 0700 12/26 0701 12/27 0700    P.O. 0 0     I.V. (mL/kg) 3521.4 (55) 3728.4 (58.3) 951.8 (14.9)    Blood   350    IV Piggyback 1458.3 950 200    Total Intake(mL/kg) 4979.7 (77.8) 4678.4 (73.1) 1501.8 (23.5)    Urine (mL/kg/hr) 1875 (1.2) 1112 (0.7) 70 (0.2)    Drains 160 705     Stool  0     Total Output 2035 1817 70    Net +2944.7 +2861.4 +1431.8                 Physical Exam  Constitutional:       General: She is not in acute distress.     Appearance: She is well-developed.      Comments: Intubated   HENT:      Head: Normocephalic.      Mouth/Throat:      Mouth: Mucous membranes are moist.   Eyes:      General: No scleral icterus.     Conjunctiva/sclera: Conjunctivae normal.   Neck:      Vascular: No JVD.   Cardiovascular:      Rate and Rhythm: Normal rate.      Heart sounds: Normal heart sounds.   Pulmonary:      Effort: Pulmonary effort is normal.      Breath sounds: No wheezing.   Abdominal:      Palpations: Abdomen is soft.      Tenderness: There is no abdominal tenderness.   Musculoskeletal:         General: Normal range of motion.      Cervical back: Neck supple.   Skin:     General: Skin is warm.      Findings: No rash.       Medications:    Current Facility-Administered Medications:     [Transfer Hold] acetaminophen (Ofirmev) injection 1,000 mg, 1,000 mg, Intravenous, Q6H PRN, VERONIQUE Buck    [Transfer Hold] cefepime (MAXIPIME) 1,000 mg in dextrose 5 % 50 mL IVPB, 1,000 mg, Intravenous, Q24H, VERONIQUE Patterson, Last Rate: 100 mL/hr at 12/26/24 0507, 1,000 mg at 12/26/24 0507    [Transfer Hold] chlorhexidine (PERIDEX) 0.12 % oral rinse 15 mL, 15 mL, Mouth/Throat, Q12H [facility], Mich Key PA-C, 15 mL at 12/26/24 0803    fentaNYL 1000 mcg in sodium chloride 0.9% 100mL infusion, 25 mcg/hr, Intravenous, Continuous, VERONIQUE uBck, Last Rate: 2.5 mL/hr at 12/26/24 0929, 25 mcg/hr  at 12/26/24 0929    [Transfer Hold] fentaNYL injection 50 mcg, 50 mcg, Intravenous, Q1H PRN, Mich Key PA-C    [Transfer Hold] heparin (porcine) subcutaneous injection 5,000 Units, 5,000 Units, Subcutaneous, Q8H SHARON, 5,000 Units at 12/26/24 0524 **AND** [COMPLETED] Platelet count, , , Once, Rip Swan PA-C    [Transfer Hold] insulin lispro (HumALOG/ADMELOG) 100 units/mL subcutaneous injection 1-5 Units, 1-5 Units, Subcutaneous, Q6H SHARON, 2 Units at 12/25/24 1132 **AND** Fingerstick Glucose (POCT), , , Q6H, Mich Key PA-C    [Transfer Hold] metroNIDAZOLE (FLAGYL) IVPB (premix) 500 mg 100 mL, 500 mg, Intravenous, Q8H, VERONIQUE Patterson, Stopped at 12/26/24 0654    multi-electrolyte (PLASMALYTE-A/ISOLYTE-S PH 7.4) IV solution, 100 mL/hr, Intravenous, Continuous, VERONIQUE Buck, Last Rate: 100 mL/hr at 12/26/24 1030, Rate Change at 12/26/24 1030    NOREPINEPHRINE 4 MG  ML NSS (CMPD ORDER) infusion, 1-30 mcg/min, Intravenous, Titrated, VERONIQUE Buck, Last Rate: 7.5 mL/hr at 12/26/24 1100, 2 mcg/min at 12/26/24 1100    [Transfer Hold] ondansetron (ZOFRAN) injection 4 mg, 4 mg, Intravenous, Q6H PRN, Mich Key PA-C, 4 mg at 12/25/24 0108    [Transfer Hold] pantoprazole (PROTONIX) injection 40 mg, 40 mg, Intravenous, Q24H SHARON, Mich Key PA-C, 40 mg at 12/26/24 0814    propofol (DIPRIVAN) 1000 mg in 100 mL infusion (premix), 5-50 mcg/kg/min, Intravenous, Titrated, VERONIQUE Patterson, Held at 12/25/24 1300    [Transfer Hold] vancomycin (VANCOCIN) IVPB (premix in dextrose) 1,000 mg 200 mL, 15 mg/kg, Intravenous, Daily PRN, VERONIQUE Patterson    vasopressin (PITRESSIN) 20 Units in sodium chloride 0.9 % 100 mL infusion, 0.04 Units/min, Intravenous, Continuous, Mich Key PA-C, Held at 12/26/24 0300      Lab Results: I have reviewed the following results:  Results from last 7 days   Lab Units 12/26/24  1116  "12/26/24  0448 12/25/24  0843 12/25/24  0514 12/25/24  0051 12/24/24  1818 12/24/24  1135 12/24/24  0536 12/24/24  0000 12/23/24  2349 12/23/24  1700 12/23/24  1333   WBC Thousand/uL 10.32* 15.23*  --  2.27* 2.89*  --   --  11.56*  --   --   --  15.10*   HEMOGLOBIN g/dL 9.5* 7.0*  --  7.5* 9.2*  --   --  9.5*  --   --   --  12.1   HEMATOCRIT % 31.4* 23.4*  --  24.8* 31.6*  --   --  30.3*  --   --   --  37.1   PLATELETS Thousands/uL 132* 170  --  260 357  --   --  348  --  398*  --  604*   POTASSIUM mmol/L  --  4.6 3.5 3.9 3.8 4.0 3.9 4.7   < >  --    < > 5.5*   CHLORIDE mmol/L  --  101 100 102 98 88* 86* 82*   < >  --    < > 63*   CO2 mmol/L  --  21 23 22 13* 22 21 23   < >  --    < > 24   BUN mg/dL  --  135* 133* 143* 179* 188* 194* 199*   < >  --    < > 205*   CREATININE mg/dL  --  3.84* 3.85* 3.96* 5.41* 5.81* 6.31* 6.97*   < >  --    < > 8.34*   CALCIUM mg/dL  --  8.1* 7.5* 7.6* 7.1* 8.4 8.2* 7.8*   < >  --    < > 8.8   MAGNESIUM mg/dL  --  2.9*  --  1.5*  --   --   --   --   --   --   --  2.2   PHOSPHORUS mg/dL  --  8.8*  --  6.8*  --   --   --  12.6*  --   --   --   --    ALBUMIN g/dL  --  2.9*  --  <1.5*  --   --   --   --   --   --   --  3.6    < > = values in this interval not displayed.       Administrative Statements     Portions of the record may have been created with voice recognition software. Occasional wrong word or \"sound a like\" substitutions may have occurred due to the inherent limitations of voice recognition software. Read the chart carefully and recognize, using context, where substitutions have occurred.If you have any questions, please contact the dictating provider.  "

## 2024-12-26 NOTE — CASE MANAGEMENT
Case Management Discharge Planning Note    Patient name Tanisha Morrow  Location ICU 10/ICU 10 MRN [id number]  : 1940 Date 2024       Current Admission Date: 2024  Current Admission Diagnosis:Shock (HCC)   Patient Active Problem List    Diagnosis Date Noted Date Diagnosed    High anion gap metabolic acidosis 2024     Acute encephalopathy 2024     Shock (HCC) 2024     Bowel perforation (HCC) 2024     Acute respiratory failure with hypoxia (HCC) 2024     Gallstone ileus (HCC) 2024     Severe sepsis (HCC) 2024     Acute kidney failure (HCC) 2024     Wears dentures 11/15/2024     Iron deficiency 2021     Chronic neck pain 09/10/2020     Hypercalcemia 09/10/2020     Hyperlipidemia associated with type 2 diabetes mellitus  (HCC) 2020     Gout 2020     Torticollis 2020     Type 2 diabetes mellitus with microalbuminuria, without long-term current use of insulin (HCC) 2020     Hypertension associated with diabetes  (HCC) 2020       LOS (days): 3  Geometric Mean LOS (GMLOS) (days): 3  Days to GMLOS:-0.1     OBJECTIVE:  Risk of Unplanned Readmission Score: 22.04         Current admission status: Inpatient   Preferred Pharmacy:   [facility] [place] [place] - [address]  [address]  [address]  [facility] [address]  Phone: [phone] Fax: [phone]    Primary Care Provider: Raquel Rocha DO    [facility]  Secondary Insurance:     DISCHARGE DETAILS:                                          Other Referral/Resources/Interventions Provided:  Referral Comments: Pt to OR yesterday for exp lap, SB resection anastomosis, WOUND VAC PLACEMENT. To OR again today for exploratory lap, abdominal washout, partial omentectomy, debridement of abdominal wall, wound VAC placement.  In ICU, vented, IV ofirmev, IV albumin, IV Ca gluc, IV abxs, IV D50 1 amp, IVF bolus 1L, IV  protonix, IVF at 100, IV fentanyl gtt, IV levo gtt, IV pitressin gtt, A line, central line, spears, closed suction drain x 2.  Wound care cs.  HMD cath placed today and CRRT initiated.  CM will continue to follow.         Treatment Team Recommendation: Other (TBD)  Discharge Destination Plan:: Other (TBD)  Transport at Discharge : Other (Comment) (TBD)

## 2024-12-26 NOTE — QUICK NOTE
Patient  kidney function not improving with low urine output.  Discussed with ICU team and it was decided the CRRT will be helpful.  I also discussed with patient niece and she is in agreement    CRRT orders written for now.  Will try to remove about 50 cc/h IV fluid and monitor patient closely

## 2024-12-26 NOTE — ANESTHESIA POSTPROCEDURE EVALUATION
Post-Op Assessment Note    CV Status:  Stable    Pain management: adequate       Mental Status:  Sleepy   Hydration Status:  Unstable   PONV Controlled:  Controlled   Airway Patency:  Patent  Airway: intubated     Post Op Vitals Reviewed: Yes    No anethesia notable event occurred.    Staff: Anesthesiologist     Reason for prolonged intubation > 24 hours:  Septic shock, open abdomenReason for prolonged intubation > 48 hours:  Septic shock, open abdomen      Last Filed PACU Vitals:  Vitals Value Taken Time   Temp 96.62 °F (35.9 °C) 12/26/24 0556   Pulse 97 12/26/24 0556   BP 88/48 12/26/24 0555   Resp 23 12/26/24 0556   SpO2 83 % 12/26/24 0556   Vitals shown include unfiled device data.    Modified Lucina:  No data recorded

## 2024-12-26 NOTE — ASSESSMENT & PLAN NOTE
Likely related to bowel perforation  Continue vasopressors for goal MAP>65  Trend endpoints of resuscitation

## 2024-12-26 NOTE — RESPIRATORY THERAPY NOTE
RT Ventilator Management Note  Tanisha Morrow 84 y.o. female MRN: [id number]  Unit/Bed#: ICU 10 Encounter: [id number]      Daily Screen         12/26/2024 0037 12/26/2024 0432          Patient safety screen outcome:: Failed Failed (P)       Not Ready for Weaning due to:: Going on Transport intubated Going on Transport intubated;Underline problem not resolved (P)                 Physical Exam:   Assessment Type: (P) Assess only  General Appearance: (P) Sedated  Respiratory Pattern: (P) Assisted  Chest Assessment: (P) Chest expansion symmetrical  Bilateral Breath Sounds: (P) Clear, Diminished  R Breath Sounds: Clear  O2 Device: (P) vent      Resp Comments: (P) pt remains intubated on full mechanical support, no changes were made to the patient's vent settings at this time. Pt to return to the OR     12/26/24 0432   Respiratory Assessment   Assessment Type Assess only   General Appearance Sedated   Respiratory Pattern Assisted   Chest Assessment Chest expansion symmetrical   Bilateral Breath Sounds Clear;Diminished   Resp Comments pt remains intubated on full mechanical support, no changes were made to the patient's vent settings at this time. Pt to return to the OR   O2 Device vent   Vent Information   Vent ID [id number]   Vent type Drager   Drager Vent Mode AC/VC+   $ Pulse Oximetry Spot Check Charge Completed   SpO2 96 %   AC/VC+ Settings   Resp Rate (BPM) 12 BPM   VT (mL) 340 mL   Insp Time (S) 0.85 S   FIO2 (%) 50 %   PEEP (cmH2O) 8 cmH2O   Rise Time (%) 20 %   Trigger Sensitivity Flow (LPM) 2 LPM   Humidification Heater   Heater Temp 98.6 °F (37 °C)   AC/VC+ Actuals   Resp Rate (BPM) 23 BPM   VT (mL) 339 mL   MV (Obs) 7.54   MAP (cmH2O) 14 cmH2O   Peak Pressure (cmH2O) 26 cmH2O   I:E Ratio (Obs) 1:1.7   Static Compliance (mL/cmH20) 17.8 mL/cmH2O   Plateau Pressure (cm H2O) 24.1 cm H2O   Heater Temperature (Obs) 98.6 °F (37 °C)   AC/VC+ ALARMS   High Peak Pressure (cmH2O) 40 cmH2O   High Resp Rate (BPM) 40 BPM    High MV (L/min) 15 L/min   Low MV (L/min) 3.5 L/min   High VT (mL) 750 mL   Maintenance   Alarm (pink) cable attached Yes   Resuscitation bag with peep valve at bedside Yes   Water bag changed No   Circuit changed No   Daily Screen   Patient safety screen outcome: Failed   Not Ready for Weaning due to: Going on Transport intubated;Underline problem not resolved   IHI Ventilator Associated Pneumonia Bundle   Daily Assessment of Readiness to Extubate Yes   Head of Bed Elevated HOB 30   ETT  Oral 8 mm   Placement Date: 12/25/24   Preoxygenated: Yes  Type: Oral  Tube Size: 8 mm  Insertion: Atraumatic  Insertion attempts: 1  Placement Verification: Auscultation;End tidal CO2;Symmetrical chest wall movement  Secured at (cm): 23   Secured at (cm) 23   Measured from Lips   Secured Location Left   Repositioned Right to Left   Secured by Commercial tube rice   Site Condition Dry   Cuff Pressure (color) Green   HI-LO Suction  Continuous low suction   HI-LO Secretions Scant   HI-LO Intervention Patent

## 2024-12-26 NOTE — ASSESSMENT & PLAN NOTE
Lab Results   Component Value Date    HGBA1C 7.7 (A) 11/15/2024       Recent Labs     12/26/24  1116 12/26/24  1634 12/26/24  1829 12/26/24  2138   POCGLU 123 69 67 101       Blood Sugar Average: Last 72 hrs:  (P) [id number]    Patient now with hypoglycemia requiring D10 infusion  Consider early initiation of TPN  Continue SSI

## 2024-12-26 NOTE — RESPIRATORY THERAPY NOTE
RT Ventilator Management Note  Tanisha Morrow 84 y.o. female MRN: [id number]  Unit/Bed#: ICU 10 Encounter: [id number]      Daily Screen         12/26/2024  0432 12/26/2024  0805          Patient safety screen outcome:: Failed Failed      Not Ready for Weaning due to:: Going on Transport intubated;Underline problem not resolved Going on Transport intubated                Physical Exam:   Assessment Type: Assess only  General Appearance: Sedated  Respiratory Pattern: Assisted  Chest Assessment: Chest expansion symmetrical  Bilateral Breath Sounds: Clear, Diminished  Suction: ET Tube  O2 Device: vent      Resp Comments: pt remains intubated no acute changes, pt to head to the OR today       12/26/24 0805   Respiratory Assessment   Assessment Type Assess only   General Appearance Sedated   Respiratory Pattern Assisted   Chest Assessment Chest expansion symmetrical   Bilateral Breath Sounds Clear;Diminished   Suction ET Tube   Resp Comments pt remains intubated no acute changes, pt to head to the OR today   O2 Device vent   Vent Information   Vent ID [email or web address]   Vent type Drager   Drager Vent Mode AC/VC+   $ Vent Daily Charge-Subsequent Yes   $ Pulse Oximetry Spot Check Charge Completed   SpO2 95 %   AC/VC+ Settings   Resp Rate (BPM) 12 BPM   VT (mL) 340 mL   Insp Time (S) 0.85 S   FIO2 (%) 50 %   PEEP (cmH2O) 8 cmH2O   Rise Time (%) 20 %   Trigger Sensitivity Flow (LPM) 2 LPM   Humidification Heater   Heater Temp 98.6 °F (37 °C)   AC/VC+ Actuals   Resp Rate (BPM) 23 BPM   VT (mL) 337 mL   MV (Obs) 7.3   MAP (cmH2O) 12 cmH2O   Peak Pressure (cmH2O) 21 cmH2O   I:E Ratio (Obs) 1:2   Static Compliance (mL/cmH20) 25.4 mL/cmH2O   Plateau Pressure (cm H2O) 24.1 cm H2O   Heater Temperature (Obs) 98.6 °F (37 °C)   AC/VC+ ALARMS   High Peak Pressure (cmH2O) 40 cmH2O   High Resp Rate (BPM) 40 BPM   High MV (L/min) 15 L/min   Low MV (L/min) 3.5 L/min   High VT (mL) 750 mL   Maintenance   Alarm (pink) cable attached Yes   Resuscitation  bag with peep valve at bedside Yes   Water bag changed No   Circuit changed No   Daily Screen   Patient safety screen outcome: Failed   Not Ready for Weaning due to: Going on Transport intubated   IHI Ventilator Associated Pneumonia Bundle   Daily Awakening Trials Performed Yes   Daily Assessment of Readiness to Extubate Yes   Head of Bed Elevated HOB 30   ETT  Oral 8 mm   Placement Date: 12/25/24   Preoxygenated: Yes  Type: Oral  Tube Size: 8 mm  Insertion: Atraumatic  Insertion attempts: 1  Placement Verification: Auscultation;End tidal CO2;Symmetrical chest wall movement  Secured at (cm): 23   Secured at (cm) 23   Measured from Lips   Secured Location (S)  Center   Repositioned (S)  Left to Center   Secured by Commercial tube rice   Site Condition Dry   Cuff Pressure (color) Green   HI-LO Suction  Continuous low suction   HI-LO Secretions Scant   HI-LO Intervention Patent

## 2024-12-26 NOTE — OP NOTE
OPERATIVE REPORT  PATIENT NAME: Tanisha Morrow    :  1940  MRN: [id number]  Pt Location: [facility] OR ROOM 03    SURGERY DATE: 2024    Surgeons and Role:     * Andre Ghosh DO - Primary     * Unique Triana PA-C - Assisting    Preop Diagnosis:  Severe sepsis (HCC) [A41.9, R65.20]  Small bowel perforation (HCC) [K63.1]    Post-Op Diagnosis Codes:     * Severe sepsis (HCC) [A41.9, R65.20]     * Small bowel perforation (HCC) [K63.1]    Procedure(s):  LAPAROTOMY EXPLORATORY, abdominal washout, partial omentectomy, debridement of abdominal wall, ABThera placement    Specimen(s):  ID Type Source Tests Collected by Time Destination   1 : Portion of Omentum Tissue Omentum TISSUE EXAM Andre Ghosh,  2024 1005    2 : umbilicus Tissue Soft Tissue, Other TISSUE EXAM Andre Ghosh,  2024 1021        Estimated Blood Loss:   Minimal    Drains:  Closed/Suction Drain Right RLQ Bulb 10 Fr. (Active)   Site Description Other (Comment) 24   Dressing Status Other (Comment) 24   Drainage Appearance Serous;Yellow 24   Status To bulb suction 24   Output (mL) 10 mL 24 0200   Number of days: 1       Closed/Suction Drain Right RLQ Bulb 10 Fr. (Active)   Site Description Other (Comment) 24   Dressing Status Other (Comment) 24   Drainage Appearance Serous;Yellow 24   Output (mL) 10 mL 24 0200   Number of days: 1       Closed/Suction Drain Anterior;Left Hip Bulb 10 Fr. (Active)   Site Description Other (Comment) 24   Dressing Status Other (Comment) 24   Drainage Appearance Serous;Yellow 24   Output (mL) 20 mL 24 0200   Number of days: 1       Urethral Catheter Double-lumen 16 Fr. (Active)   Reasons to continue Urinary Catheter  Accurate I&O assessment in critically ill patients (48 hr. max) 24 0800   Goal for Removal Remove after 48 hrs of I/O monitoring 24 0800    Site Assessment Clean;Skin intact 12/26/24 0800   Dyer Care Done 12/26/24 0800   Collection Container Standard drainage bag 12/26/24 0800   Securement Method Securing device (Describe) 12/26/24 0800   Securing Device Change Date 12/31/24 12/25/24 2000   Output (mL) 40 mL 12/26/24 0800   Number of days: 2       Anesthesia Type:   General    Operative Indications:  Severe sepsis (HCC) [A41.9, R65.20]  Small bowel perforation (HCC) [K63.1]    Operative Findings:  Purulent material in all 4 quadrants of the abdomen  Anastomosis was viable and patent  Small bowel was inspected from the ligament of Treitz to the ileocecal valve without any insult or necrosis  Colon was inspected from the cecum to the rectum without any insult or necrosis  Stomach without any insult or necrosis  There was some omental adhesions in the right upper quadrant to the area of the gallbladder and duodenum, this was not disturbed so that the fistula was not disturbed  A portion of the omentum was dusky and this was excised using the Enseal device  Part of the fat and skin of the abdominal wall was also dusky so this was excised  Abdominal wall musculature was pale but viable    Complications:   None    Procedure and Technique:  The patient was seen again in Preoperative Holding.  All the risks, benefits, complications, treatment options, and expected outcomes were discussed with the patient and family at length. All questions were answered to satisfaction. There was concurrence with the proposed plan and informed consent was obtained. The site of surgery was properly noted/marked. The patient was taken to Operating Room, identified, and the procedure verified as exploratory laparotomy, possible bowel resection, possible ostomy, all other indicated procedures.    The patient was placed in the supine position on the operating room table.  The patient had received preoperative antibiotics and SCDs placed on the bilateral lower extremities.  The  patient was intubated and sedated in the ICU.    The ABThera VAC was removed.  The abdomen was then prepped and draped in the usual sterile fashion using Betadine.  A Time-Out was then performed with all involved present confirming the correct patient, procedure, antibiotics, and any additional concerns.     The abdomen was inspected and the anastomosis was identified.  The abdominal incision was extended superiorly. The anastomosis was viable and widely patent.  There was purulent material noted in all 4 quadrants of the abdomen.  The small bowel was then run from the ligament of Treitz to the ileocecal valve.  The colon was then run from the cecum to the rectum.  The stomach was inspected.  No insult or necrosis was noted.  There was some adhesions of the omentum in the right upper quadrant.  These were left alone as not to disturb the fistula between the gallbladder and the duodenum.  There was a portion of omentum which was dusky and nonviable.  This was excised using the Enseal device.  Hemostasis was noted.  A portion of the skin including the umbilicus was also very dark and appeared nonviable.  This was excised.  Some of the superficial fatty tissue also appeared nonviable and this was excised to normal-appearing fat.  The abdominal wall muscles were noted to be pale but viable.  There was no signs of necrosis.  The abdomen was then irrigated copiously in all 4 quadrants using 7 L of saline.  Hemostasis was noted.  Due to the amount of contamination the decision was made to leave the abdomen open at this time.    An ABThera VAC was placed back onto the abdomen.  The patient will return to the operating room for a planned procedure.    All instrument, sponge, and needle counts were noted to be correct at the conclusion of the case.     I was present for the entire procedure., A qualified resident physician was not available., and A physician assistant was required during the procedure for retraction, tissue  handling, dissection and suturing.    Patient Disposition:  Critical Care Unit, intubated and critically ill, and patient will return to OR for planned surgery as a staged procedure    This procedure was not performed to treat colon cancer through resection    SIGNATURE: Andre Ghosh DO  DATE: December 26, 2024  TIME: 10:27 AM

## 2024-12-27 PROBLEM — M79.89 NECROTIZING SOFT TISSUE INFECTION: Status: ACTIVE | Noted: 2024-01-01

## 2024-12-27 PROBLEM — E87.20 LACTIC ACIDOSIS: Status: ACTIVE | Noted: 2024-01-01

## 2024-12-27 NOTE — QUICK NOTE
"Patient's niece, Melvina Juarez, was the patient's noted point of contact during her time in the hospital. During her hospital stay she provided consent, as next of kin, while patient was critically ill.    Patient transitioned to comfort measures today and passed away. Melvina is requesting an autopsy to be performed prior to burial. Today, she shared that the patient's daughter agrees with choice for autopsy. Consent was obtained from Melvina but subsequent review of documentation did suggest concern that the daughter should be the one providing consent for the autopsy.    We discussed this with Melvina and she shared that the daughter is not available to sign consent. She is an active drug user and is \"in and out of alf.\" She has not been in a good state of health to provide any medical decision making for her mother. She does not believe we would be able to obtain consent in person or electronically given these health issues.    We reviewed the language on the consent form. She said that the patient's brother is another point of contact but he is unable to travel to [place]. HE could perform an electronic consent if this was necessary and Melvina's consent was not sufficient.     In conversation it is clear that Ms. Juarez is assisting us in obtaining consent from the appropriate individuals as she is able to facilitate.    She provided the email for the patient's brother: Daryn Madeline, in case he also needed to complete the paperwork:    [email or web address]  [phone] (cell for Daryn Lentz's wife)      Mya Hanley MD      "

## 2024-12-27 NOTE — QUICK NOTE
CVVH stopped due to problems with arterial pressure alerts. Blood was returned. Given clinical condition, no plans to restart. Family is at bedside and aware.    Mya Hanley MD

## 2024-12-27 NOTE — RESPIRATORY THERAPY NOTE
RT Ventilator Management Note  Tanisha Morrow 84 y.o. female MRN: [id number]  Unit/Bed#: ICU 10 Encounter: [id number]      Daily Screen         12/26/2024 2013 12/27/2024 0048          Patient safety screen outcome:: Failed Failed      Not Ready for Weaning due to:: Going on Transport intubated;Underline problem not resolved Going on Transport intubated                Physical Exam:   Assessment Type: Assess only  General Appearance: Sedated  Respiratory Pattern: Assisted  Chest Assessment: Chest expansion symmetrical  Bilateral Breath Sounds: Clear, Diminished  Suction: ET Tube  O2 Device: vent      Resp Comments: no changes made to the patient's vent settings     12/27/24 0048   Respiratory Assessment   Assessment Type Assess only   General Appearance Sedated   Respiratory Pattern Assisted   Chest Assessment Chest expansion symmetrical   Bilateral Breath Sounds Clear;Diminished   Resp Comments no changes made to the patient's vent settings   O2 Device vent   Vent Information   Vent ID Satnam   Vent type Drager   Drager Vent Mode AC/VC+   $ Pulse Oximetry Spot Check Charge Completed   AC/VC+ Settings   Resp Rate (BPM) 12 BPM   VT (mL) 340 mL   Insp Time (S) 0.85 S   FIO2 (%) 40 %   PEEP (cmH2O) 8 cmH2O   Rise Time (%) 20 %   Trigger Sensitivity Flow (LPM) 2 LPM   Humidification Heater   Heater Temp 98.6 °F (37 °C)   AC/VC+ Actuals   Resp Rate (BPM) 29 BPM   VT (mL) 348 mL   MV (Obs) 9.43   MAP (cmH2O) 12 cmH2O   Peak Pressure (cmH2O) 17 cmH2O   I:E Ratio (Obs) 1:1.6   Static Compliance (mL/cmH20) 23.3 mL/cmH2O   Plateau Pressure (cm H2O) 18 cm H2O   Heater Temperature (Obs) 98.6 °F (37 °C)   AC/VC+ ALARMS   High Peak Pressure (cmH2O) 40 cmH2O   High Resp Rate (BPM) 40 BPM   High MV (L/min) 15 L/min   Low MV (L/min) 3.5 L/min   High VT (mL) 750 mL   Maintenance   Alarm (pink) cable attached Yes   Resuscitation bag with peep valve at bedside Yes   Water bag changed No   Circuit changed No   Daily Screen   Patient  safety screen outcome: Failed   Not Ready for Weaning due to: Going on Transport intubated   IHI Ventilator Associated Pneumonia Bundle   Daily Assessment of Readiness to Extubate Yes   Head of Bed Elevated HOB 30   ETT  Oral 8 mm   Placement Date: 12/25/24   Preoxygenated: Yes  Type: Oral  Tube Size: 8 mm  Insertion: Atraumatic  Insertion attempts: 1  Placement Verification: Auscultation;End tidal CO2;Symmetrical chest wall movement  Secured at (cm): 23   Secured at (cm) 23   Measured from Lips   Secured Location Right   Repositioned Center to Right   Secured by Commercial tube rice   Site Condition Dry   Cuff Pressure (color) Green   HI-LO Suction  Continuous low suction   HI-LO Secretions Scant   HI-LO Intervention Patent

## 2024-12-27 NOTE — PROGRESS NOTES
Progress Note - Nephrology   Name: Tanisha Morrow 84 y.o. female I MRN: [id number]  Unit/Bed#: ICU 10 I Date of Admission: 12/23/2024   Date of Service: 12/27/2024 I Hospital Day: 4    Assessment & Plan  Acute kidney failure (HCC)  CRRT started because of persistent acidosis and volume status.  Patient is overall not doing well in spite of that and may need to go on hospice care.  Being discussed with ICU  Severe sepsis (HCC)  Likely to be intra-abdominal source.  On antibiotic for now  High anion gap metabolic acidosis  CRRT not getting much better with persistent lactic acidosis  Shock (HCC)  Due to sepsis  Bowel perforation (HCC)  Had been to her again  Patient overall prognosis guarded with overall not improving condition.  Does have persistent lactic acidosis with generalized ecchymosis.  ICU going to discuss with family about hospice care and comfort care.  Will stop CRRT at that time        I have reviewed the nephrology recommendations including overall poor prognosis, with ICU, and we are in agreement with renal plan including the information outlined above.     Subjective   Brief History of Admission -acute kidney injury    Patient had a bowel movement requiring surgery    Overall deteriorating condition remain intubated requiring vasopressor    CRRT is to be continued    Has been to OR today for second look because of persistent acidosis    Objective :  Temp:  [95.7 °F (35.4 °C)-99 °F (37.2 °C)] 97.5 °F (36.4 °C)  HR:  [] 95  BP: ()/(51-81) 110/59  Resp:  [16] 16  SpO2:  [20 %-100 %] 100 %    Current Weight: Weight - Scale: 71.2 kg (156 lb 15.5 oz)  First Weight: Weight - Scale: 64.3 kg (141 lb 12.1 oz)  I/O         12/25 0701  12/26 0700 12/26 0701  12/27 0700 12/27 0701  12/28 0700    P.O. 0      I.V. (mL/kg) 3728.4 (58.3) 3920.9 (55.1) 646 (9.1)    Blood  350     IV Piggyback 950 875 250    Total Intake(mL/kg) 4678.4 (73.1) 5145.9 (72.3) 896 (12.6)    Urine (mL/kg/hr) 1112 (0.7) 237 (0.1) 10  (0)    Drains 705 1295 800    Other  855 77    Stool 0 0     Total Output 1817 2387 887    Net +2861.4 +2758.9 +9           Unmeasured Stool Occurrence  1 x           Physical Exam  Constitutional:       General: She is not in acute distress.     Appearance: She is well-developed. She is ill-appearing.      Comments: Intubated   HENT:      Head: Normocephalic.      Mouth/Throat:      Mouth: Mucous membranes are dry.   Eyes:      General: No scleral icterus.     Conjunctiva/sclera: Conjunctivae normal.   Neck:      Vascular: No JVD.   Cardiovascular:      Rate and Rhythm: Normal rate.      Heart sounds: Normal heart sounds.   Pulmonary:      Effort: Pulmonary effort is normal.      Breath sounds: No wheezing.   Abdominal:      Palpations: Abdomen is soft.      Tenderness: There is abdominal tenderness.   Musculoskeletal:         General: Normal range of motion.      Cervical back: Neck supple.   Skin:     General: Skin is warm.      Findings: Erythema present. No rash.      Comments: Ecchymotic         Medications:    Current Facility-Administered Medications:     fentaNYL 1000 mcg in sodium chloride 0.9% 100mL infusion, 100 mcg/hr, Intravenous, Continuous, Mya Hanley MD, Last Rate: 10 mL/hr at 12/27/24 1233, 100 mcg/hr at 12/27/24 1233    fentaNYL injection 25 mcg, 25 mcg, Intravenous, Q2H PRN, Richard Beckett PA-C    haloperidol lactate (HALDOL) injection 0.5 mg, 0.5 mg, Intravenous, Q2H PRN, Richard Beckett PA-C    LORazepam (ATIVAN) injection 1 mg, 1 mg, Intravenous, Q1H PRN, Richard Beckett PA-C      Lab Results: I have reviewed the following results:  Results from last 7 days   Lab Units 12/27/24  0438 12/27/24  0217 12/27/24  0008 12/26/24  1618 12/26/24  1116 12/26/24  0448 12/25/24  0843 12/25/24  0514 12/25/24  0051 12/24/24  1135 12/24/24  0536 12/23/24  1700 12/23/24  1333   WBC Thousand/uL 20.68* 17.81*  --   --  10.32* 15.23*  --  2.27* 2.89*  --  11.56*  --  15.10*   HEMOGLOBIN g/dL 8.1* 8.3*  --   --  9.5*  "7.0*  --  7.5* 9.2*  --  9.5*  --  12.1   HEMATOCRIT % 26.6* 27.5*  --   --  31.4* 23.4*  --  24.8* 31.6*  --  30.3*  --  37.1   PLATELETS Thousands/uL 63* 75*  --   --  132* 170  --  260 357  --  348   < > 604*   POTASSIUM mmol/L 4.5  --  5.1 3.9 4.1 4.6 3.5 3.9 3.8   < > 4.7   < > 5.5*   CHLORIDE mmol/L 101  --  105 104 101 101 100 102 98   < > 82*   < > 63*   CO2 mmol/L 19*  --  16* 21 20* 21 23 22 13*   < > 23   < > 24   BUN mg/dL 58*  --  67* 103* 123* 135* 133* 143* 179*   < > 199*   < > 205*   CREATININE mg/dL 1.97*  --  2.04* 3.12* 3.78* 3.84* 3.85* 3.96* 5.41*   < > 6.97*   < > 8.34*   CALCIUM mg/dL 8.6  --  9.1 8.5 8.8 8.1* 7.5* 7.6* 7.1*   < > 7.8*   < > 8.8   MAGNESIUM mg/dL 2.2  --  2.3 2.6 2.9* 2.9*  --  1.5*  --   --   --   --  2.2   PHOSPHORUS mg/dL 5.6*  --  5.8* 7.6*  --  8.8*  --  6.8*  --   --  12.6*  --   --    ALBUMIN g/dL 3.5  --   --   --   --  2.9*  --  <1.5*  --   --   --   --  3.6    < > = values in this interval not displayed.       Administrative Statements     Portions of the record may have been created with voice recognition software. Occasional wrong word or \"sound a like\" substitutions may have occurred due to the inherent limitations of voice recognition software. Read the chart carefully and recognize, using context, where substitutions have occurred.If you have any questions, please contact the dictating provider.  "

## 2024-12-27 NOTE — ANESTHESIA POSTPROCEDURE EVALUATION
Post-Op Assessment Note    CV Status:  Stable         Mental Status:  Unresponsive   Airway Patency:  Patent  Airway: intubated     Post Op Vitals Reviewed: Yes    No anethesia notable event occurred.    Staff: Anesthesiologist, CRNA           Last Filed PACU Vitals:  Vitals Value Taken Time   Temp 98.4    Pulse 95    /55    Resp 20    SpO2 100        Modified Lucina:  No data recorded

## 2024-12-27 NOTE — RESPIRATORY THERAPY NOTE
RT Ventilator Management Note  Tanisha Morrow 84 y.o. female MRN: [id number]  Unit/Bed#: ICU 10 Encounter: [id number]      Daily Screen         12/26/2024 0805 12/26/2024 2013          Patient safety screen outcome:: Failed Failed (P)       Not Ready for Weaning due to:: Going on Transport intubated Going on Transport intubated;Underline problem not resolved (P)                 Physical Exam:   Assessment Type: (P) Assess only  General Appearance: (P) Sedated  Respiratory Pattern: (P) Assisted  Chest Assessment: (P) Chest expansion symmetrical  Bilateral Breath Sounds: (P) Clear, Diminished  Suction: ET Tube  O2 Device: (P) vent      Resp Comments: (P) pt remains intubate on full mechanical support, no changes were made to the patient's vent  settings     12/26/24 2013   Respiratory Assessment   Assessment Type Assess only   General Appearance Sedated   Respiratory Pattern Assisted   Chest Assessment Chest expansion symmetrical   Bilateral Breath Sounds Clear;Diminished   Resp Comments pt remains intubate on full mechanical support, no changes were made to the patient's vent  settings   O2 Device vent   Vent Information   Vent ID Satnam   Vent type Drager   Drager Vent Mode AC/VC+   $ Pulse Oximetry Spot Check Charge Completed   AC/VC+ Settings   Resp Rate (BPM) 12 BPM   VT (mL) 340 mL   Insp Time (S) 0.85 S   FIO2 (%) 40 %   PEEP (cmH2O) 8 cmH2O   Rise Time (%) 20 %   Trigger Sensitivity Flow (LPM) 2 LPM   Humidification Heater   Heater Temp 98.6 °F (37 °C)   AC/VC+ Actuals   Resp Rate (BPM) 27 BPM   VT (mL) 323 mL   MV (Obs) 8.95   MAP (cmH2O) 11 cmH2O   Peak Pressure (cmH2O) 18 cmH2O   I:E Ratio (Obs) 1:1.8   Static Compliance (mL/cmH20) 25.4 mL/cmH2O   Plateau Pressure (cm H2O) 14.3 cm H2O   Heater Temperature (Obs) 98.6 °F (37 °C)   AC/VC+ ALARMS   High Peak Pressure (cmH2O) 40 cmH2O   High Resp Rate (BPM) 40 BPM   High MV (L/min) 15 L/min   Low MV (L/min) 3.5 L/min   High VT (mL) 750 mL   Maintenance   Alarm  (pink) cable attached Yes   Resuscitation bag with peep valve at bedside Yes   Water bag changed No   Circuit changed No   Daily Screen   Patient safety screen outcome: Failed   Not Ready for Weaning due to: Going on Transport intubated;Underline problem not resolved   IHI Ventilator Associated Pneumonia Bundle   Daily Assessment of Readiness to Extubate Yes   Head of Bed Elevated HOB 30   ETT  Oral 8 mm   Placement Date: 12/25/24   Preoxygenated: Yes  Type: Oral  Tube Size: 8 mm  Insertion: Atraumatic  Insertion attempts: 1  Placement Verification: Auscultation;End tidal CO2;Symmetrical chest wall movement  Secured at (cm): 23   Secured at (cm) 23   Measured from Lips   Secured Location Center   Secured by Commercial tube rice   Site Condition Dry   Cuff Pressure (color) Green   HI-LO Suction  Continuous low suction   HI-LO Secretions Scant   HI-LO Intervention Patent

## 2024-12-27 NOTE — PROGRESS NOTES
Tanisha Morrow is a 84 y.o. female who is currently ordered Vancomycin IV with management by the Pharmacy Consult service.  Relevant clinical data and objective / subjective history reviewed.  Vancomycin Assessment:  Indication and Goal AUC/Trough: Other, Intra-abdominal, -600, trough >10  Clinical Status: stable  Micro:     Renal Function:  SCr: 1.97 mg/dL  CrCl: 21.8 mL/min  Renal replacement: CVVH D  Days of Therapy: 3  Current Dose: 1000mg IV Daily PRN for random vancomycin level of 15mcg/ml  Vancomycin Plan:  New Dosinmg IV q12h  Dialysis dosing  Next Level: 24 1000 Trough prior to 3rd dose  Renal Function Monitoring: Daily BMP and UOP  Pharmacy will continue to follow closely for s/sx of nephrotoxicity, infusion reactions and appropriateness of therapy.  BMP and CBC will be ordered per protocol. We will continue to follow the patient’s culture results and clinical progress daily.    Tara Haines, Pharmacist

## 2024-12-27 NOTE — PLAN OF CARE
Problem: SAFETY ADULT  Goal: Patient will remain free of falls  Description: INTERVENTIONS:  - Consult OT/PT to assist with strengthening/mobility   - Keep bed low and locked with side rails adjusted as appropriate  - Initiate and maintain comfort rounds  - Initiate/Maintain bed alarm  - Apply yellow socks and bracelet for high fall risk patients  - Consider moving patient to room near nurses station  Outcome: Progressing

## 2024-12-27 NOTE — RESPIRATORY THERAPY NOTE
12/27/24 1103   Respiratory Assessment   Assessment Type Assess only   General Appearance Sedated   Respiratory Pattern Assisted   Suction ET Tube   Resp Comments Pt remains on full vent support.  No changes made at this time.  Will wean as able.   O2 Device vent   Vent Information   Vent ID Satnam   Vent type Drager   Drager Vent Mode AC/VC+   $ Pulse Oximetry Spot Check Charge Completed   SpO2 96 %   AC/VC+ Settings   Resp Rate (BPM) 12 BPM   VT (mL) 340 mL   Insp Time (S) 0.85 S   FIO2 (%) 40 %   PEEP (cmH2O) 8 cmH2O   Rise Time (%) 20 %   Trigger Sensitivity Flow (LPM) 2 LPM   Humidification Heater   Heater Temp 98.6 °F (37 °C)   AC/VC+ Actuals   Resp Rate (BPM) 19 BPM   VT (mL) 427 mL   MV (Obs) 6.1   MAP (cmH2O) 11 cmH2O   Peak Pressure (cmH2O) 20 cmH2O   I:E Ratio (Obs) 1:2.7   Static Compliance (mL/cmH20) 28.1 mL/cmH2O   Plateau Pressure (cm H2O) 16.3 cm H2O   Heater Temperature (Obs) 98.6 °F (37 °C)   AC/VC+ ALARMS   High Peak Pressure (cmH2O) 40 cmH2O   High Resp Rate (BPM) 40 BPM   High MV (L/min) 15 L/min   Low MV (L/min) 3.5 L/min   High VT (mL) 750 mL   Maintenance   Alarm (pink) cable attached Yes   Resuscitation bag with peep valve at bedside Yes   Water bag changed No   Circuit changed No   Daily Screen   Patient safety screen outcome: Failed   Not Ready for Weaning due to: Underline problem not resolved   ETT  Oral 8 mm   Placement Date: 12/25/24   Preoxygenated: Yes  Type: Oral  Tube Size: 8 mm  Insertion: Atraumatic  Insertion attempts: 1  Placement Verification: Auscultation;End tidal CO2;Symmetrical chest wall movement  Secured at (cm): 23   Secured at (cm) 22   Measured from Lips   Cuff Pressure (color) Green

## 2024-12-27 NOTE — QUICK NOTE
16:40:  Autopsy requested by patient's niece Melvina Juarez who is the patient's POA. She is not, however patient's next of kin as she has an adult daughter who lives in [place]. I discussed the protocol with our pathology department who states that this must be the next of kin in the order stated on the form. I spoke with Melvina stating her daughters options would be to 1. Sign the consent in person by Monday or 2. Fax or Scan/Email the completed consent back by Monday morning or the autopsy cannot be completed. She understood this and will call back with an email or fax number for me to send consent form to.     Richard Beckett PA-C       17:51  Patient's niece Melvina returned phone call. Please see Dr. Mya Hanley's documentation regarding this. I called back the pathology department to discuss situation and am awaiting call back regarding decision of whether or not patient's niece or possibly brother can be the appropriate surrogate given the situation.    Richard Beckett PA-C     18:16    Received a call back from Rowan Gaytan from [facility]'s Pathology Department who spoke with Yolanda Dawson who is the director of pathology. She will review on Monday. In the mean time will send completed consent form from Melvina (Niece) and Daryn (brother).     Richard Beckett PA-C     18:44    I spoke with Sarahy (wife of patient's brother, Daryn) and discussed consent form. She gave me the following email ([email or web address]) to send the consent form. I sent the form to this email, provided a callback number for questions and the fax number to return form. I will call in the AM to confirm form was received and that there are no further questions to help facilitate.    Richard Beckett PA-C

## 2024-12-27 NOTE — RESPIRATORY THERAPY NOTE
12/27/24 1445   Respiratory Assessment   Resp Comments Pt extubated at this time for comfort.   O2 Device ra   Vent Information   Ventilator End Yes

## 2024-12-27 NOTE — DISCHARGE SUMMARY
Discharge Summary - Critical Care/ICU   Name: Tanisha Morrow 84 y.o. female I MRN: [id number]  Unit/Bed#: ICU 10 I Date of Admission: 12/23/2024   Date of Service: 12/27/2024 I Hospital Day: 4    Admission Date: 12/23/2024   Admitting Diagnosis: Hyperkalemia [E87.5]  Weakness [R53.1]  Acute kidney injury (HCC) [N17.9]  Generalized weakness [R53.1]  Uncontrolled diabetes mellitus with hyperglycemia (HCC) [E11.65]  Discharge Date: 12/27/24    HPI:   Ms. Tanisha Morrow was an 84-year-old female with a past medical history of diabetes type 2, hypertension, hyperlipidemia who presented on 12-23 with generalized malaise after having significant nausea and vomiting.  This started on 12-13 through 12-15 which initially improved, but gotten worse prior to arrival.  She was found to have acute renal failure with a creatinine of 8, BUN of 200, potassium of 5.5, sodium of 125.  She had a CAT scan that showed a small bowel obstruction with transition point in the upper abdomen concerning for gallstone ileus.  [facility]    Procedures Performed:   12/25 Ex-lap with small bowel resection, primary anastomosis, abthera placement  12/25 ETT, SANDRA Webb TLC  12/25 CT A/P- findings suspicious for gallbladder ileus with associated pneumoperitoneum and pneumobilia, obstructing gallbladder stone within the small bowel of the upper abdomen measuring 2.3 cm in caliber, low-attenuation within hpeatic parenchyma adjacent to the gallbladder  12/24 RUQ US- gallbladder kyree obscured by gas presumable from pneumobilia  12/24 Obstruction series- nonobstructive bowel gas pattern  12/24 ECHO- EF 55%, grade 1 diastolic dysfunction  12/26 OR: Ex-lap, washout, replacement of VAC   12/26: Right IJ temp HD cath   12/27: ExLap: Extensive NSTI of abdominal wall    Summary of Hospital Course:   She was seen in consultation by surgery who recommended an obstruction series.  She was also seen by nephrology.  Patient initially had  improvement with hydration and was passing flatus.  She unfortunately was a rapid response on 12-25 for hypotension, tachycardia, altered mental status.  She had a CT abdomen pelvis that showed pneumoperitoneum and pneumobilia with an obstructing gallstone in the small bowel highly suspicious for gallstone ileus.  She continued to decompensate and therefore she was intubated, had a left IJ central venous catheter placed as well as a right radial arterial line.  She was taken to the operating room on 12-25 in the early mornings and was found to have a perforation of the small bowel that was resected and had a primary anastomosis with feculent peritonitis.  She was left open with an ABThera VAC.  Initially, patient improved following operative management, acidosis resolved, vasopressors were able to be turned off, however yesterday, 12-26, patient with acute decompensation with acute hypotension.  She had a lactic acid of 5, new anion gap of 21 and was hypoglycemic to 70.  She was restarted on vasopressors.  Due to decompensation, she was returned to the operating room.  She underwent an additional exploratory laparotomy that showed purulent material in all 4 quadrants, there is a small portion of the omentum and adipose/skin of the abdominal wall that were dusky and were excised, however small bowel, anastomosis, colon, stomach all appeared viable and without any insult or necrosis.  Due to extensive acidemia, she was brought to the intensive care unit, had a right IJ temporary HD catheter placement started on continuous dialysis.  She once again briefly improved, however unfortunately had worsening lactic acidosis, vasopressin needed to be restarted and she was found to have flank erythema, bruising, and mottling.  She was emergently taken back to the operating room once again this morning and unfortunately was found to have extensive abdominal wall necrotizing fasciitis.  Due to the extent of the infection  patient's condition at the time, the general surgeon deemed this to be a nonsurvivable insult.  Patient's family was called and arrived at bedside shortly afterwards.  Several family discussions were had and ultimately patient was made comfort measures only.  Endotracheal tube was removed and continuous renal replacement therapy and vasopressors were discontinued and patient  shortly afterwards.  Please see additional documentation for exact time of death.  Our deepest condolences were passed on to the patient's family who are at bedside.    This is a brief summary of patient's hospital stay. For more complete information please see daily progress notes.     Significant Findings, Care, Treatment and Services Provided:     XR chest portable ICU   Final Result by Lane Weeks MD ( 1603)      There is hazy bibasilar opacity which may represent layered effusion and/or parenchymal opacity.            Workstation performed: [id number]         XR chest portable ICU   Final Result by Loli Uribe MD ( 1158)      NG tube in stomach.      Mild pulmonary venous congestion with mild bibasilar atelectasis.            Workstation performed: [id number]         XR chest portable ICU   Final Result by Loli Uribe MD ( 0800)      Left jugular catheter at cavoatrial junction with no pneumothorax.      NG tube coiled in cervical esophagus with tip in mid esophagus. Recommend advancing.      Persistent mild bibasilar atelectasis.      Small pleural effusions better shown on CT.      The study was marked in EPIC for immediate notification.      Workstation performed: [id number]         CT abdomen pelvis w contrast   Final Result by Pamela Encarnacion MD ( 0127)      Findings highly suspicious for gallbladder ileus with associated pneumoperitoneum and pneumobilia. Obstructing gallbladder stone within the small bowel of the upper abdomen measuring 2.3 cm in caliber.      Low-attenuation within the  hepatic parenchyma adjacent to the gallbladder consistent with regional edema      Findings discused with Dr Levy 126 am, 24         Workstation performed: [id number]         US right upper quadrant   Final Result by Cesar Israel MD ( 1834)      Gallbladder fossa is obscured by gas presumably from pneumobilia as also seen on the CT. The gallbladder is not identified      Workstation performed: [id number]         XR abdomen obstruction series   Final Result by Valdez To MD ( 1409)      Nonobstructive bowel gas pattern.         Workstation performed: [id number]         CT abdomen pelvis wo contrast   Final Result by Devang Velasquez MD ( 1610)      Small bowel obstruction with transition point in the upper abdomen where there is an indeterminate partially calcified intraluminal small bowel lesion. Gallstone ileus in the differential but unclear when gallbladder was removed. Recommend evaluation by    general surgery.      Tiny bilateral nonobstructing renal calculi. No hydronephrosis.      Additional chronic/incidental findings as detailed above.            I personally epic secured messaged discussed this study with Rip Swan on 2024 4:04 PM.                        Workstation performed: [id number]              Complications: None    Disposition:      Final Diagnosis:   Principal Problem:    Shock (HCC)  Active Problems:    Type 2 diabetes mellitus with microalbuminuria, without long-term current use of insulin (HCC)    Severe sepsis (HCC)    Acute kidney failure (HCC)    High anion gap metabolic acidosis    Acute encephalopathy    Bowel perforation (HCC)    Acute respiratory failure with hypoxia (HCC)    Gallstone ileus (HCC)    Lactic acidosis    Necrotizing soft tissue infection       Medical Problems       Resolved Problems  Date Reviewed: 11/15/2024          Resolved    Metabolic alkalosis 2024     Resolved by  Rosendo Robins,  VERONIQUE    Hyponatremia 2024     Resolved by  VERONIQUE Patterson    Small bowel obstruction (HCC) 2024     Resolved by  VERONIQUE Patterson          Condition at Time of Death: Critical  Date, Time and Cause of Death    Date of Death: 24  Time of Death:  3:09 PM  Preliminary Cause of Death: Small bowel perforation (HCC)  Entered by: Richard Beckett PA-C[MR1.1]       Attribution       MR1.1 Richard Beckett PA-C 24 15:14            Death Note:  INPATIENT DEATH NOTE  Tanisha Morrow 84 y.o. female MRN: [id number]  Unit/Bed#: ICU 10 Encounter: [id number]    Date, Time and Cause of Death    Date of Death: 24  Time of Death:  3:09 PM  Preliminary Cause of Death: Small bowel perforation (HCC)  Entered by: Richard Beckett PA-C[MR1.1]       Attribution       MR1.1 Richard Beckett PA-C 24 15:14             Patient's Information  Pronounced by: Richard Beckett PA-C  Did the patient's death occur in the ED?: No  Did the patient's death occur in the OR?: No  Did the patient's death occur less than 10 days post-op?: Yes  Did the patient's death occur within 24 hours of admission?: No  Was code status DNR at the time of death?: Yes    PHYSICAL EXAM:  Unresponsive to noxious stimuli, Spontaneous respirations absent, Breath sounds absent, Carotid pulse absent, Heart sounds absent, Pupillary light reflex absent, and Corneal blink reflex absent    Medical Examiner notification criteria:   within 24 hours of surgery / procedure / anesthesia   Medical Examiner's office notified?:  Yes   Medical Examiner accepted case?:  Pending  Name of Medical Examiner: Pending    Family Notification  Was the family notified?: Yes  Date Notified: 24  Time Notified: 1509  Notified by: Richard Beckett PA-C  Name of Family Notified of Death: Melvina Juarez   Relationship to Patient: Other relative  Family Notification Route: At bedside  Was the family told to contact a  home?: Yes  Name of   Home:: TBD    Autopsy Options:  Decision for post-mortem examination not yet made by next of kin.    Primary Service Attending Physician notified?:  yes - Attending:  Mya Hanley MD    Physician/Resident responsible for completing Discharge Summary:  Richard Beckett PA-C

## 2024-12-27 NOTE — PLAN OF CARE
Problem: PAIN - ADULT  Goal: Verbalizes/displays adequate comfort level or baseline comfort level  Description: Interventions:  - Encourage patient to monitor pain and request assistance  - Assess pain using appropriate pain scale  - Administer analgesics based on type and severity of pain and evaluate response  - Implement non-pharmacological measures as appropriate and evaluate response  - Consider cultural and social influences on pain and pain management  - Notify physician/advanced practitioner if interventions unsuccessful or patient reports new pain  Outcome: Progressing     Problem: INFECTION - ADULT  Goal: Absence or prevention of progression during hospitalization  Description: INTERVENTIONS:  - Assess and monitor for signs and symptoms of infection  - Monitor lab/diagnostic results  - Monitor all insertion sites, i.e. indwelling lines, tubes, and drains  - Monitor endotracheal if appropriate and nasal secretions for changes in amount and color  - [place] appropriate cooling/warming therapies per order  - Administer medications as ordered  - Instruct and encourage patient and family to use good hand hygiene technique  - Identify and instruct in appropriate isolation precautions for identified infection/condition  Outcome: Progressing  Goal: Absence of fever/infection during neutropenic period  Description: INTERVENTIONS:  - Monitor WBC    Outcome: Progressing     Problem: SAFETY ADULT  Goal: Patient will remain free of falls  Description: INTERVENTIONS:  - Educate patient/family on patient safety including physical limitations  - Instruct patient to call for assistance with activity   - Consult OT/PT to assist with strengthening/mobility   - Keep Call bell within reach  - Keep bed low and locked with side rails adjusted as appropriate  - Keep care items and personal belongings within reach  - Initiate and maintain comfort rounds  - Make Fall Risk Sign visible to staf  - Apply yellow socks and bracelet  for high fall risk patients  - Consider moving patient to room near nurses station  Outcome: Progressing  Goal: Maintain or return to baseline ADL function  Description: INTERVENTIONS:  -  Assess patient's ability to carry out ADLs; assess patient's baseline for ADL function and identify physical deficits which impact ability to perform ADLs (bathing, care of mouth/teeth, toileting, grooming, dressing, etc.)  - Assess/evaluate cause of self-care deficits   - Assess range of motion  - Assess patient's mobility; develop plan if impaired  - Assess patient's need for assistive devices and provide as appropriate  - Encourage maximum independence but intervene and supervise when necessary  - Involve family in performance of ADLs  - Assess for home care needs following discharge   - Consider OT consult to assist with ADL evaluation and planning for discharge  - Provide patient education as appropriate  Outcome: Progressing  Goal: Maintains/Returns to pre admission functional level  Description: INTERVENTIONS:  - Perform AM-PAC 6 Click Basic Mobility/ Daily Activity assessment daily.  - Set and communicate daily mobility goal to care team and patient/family/caregiver.   - Collaborate with rehabilitation services on mobility goals if consulted  - Out of bed for toileting  - Record patient progress and toleration of activity level   Outcome: Progressing     Problem: DISCHARGE PLANNING  Goal: Discharge to home or other facility with appropriate resources  Description: INTERVENTIONS:  - Identify barriers to discharge w/patient and caregiver  - Arrange for needed discharge resources and transportation as appropriate  - Identify discharge learning needs (meds, wound care, etc.)  - Arrange for interpretive services to assist at discharge as needed  - Refer to Case Management Department for coordinating discharge planning if the patient needs post-hospital services based on physician/advanced practitioner order or complex needs  related to functional status, cognitive ability, or social support system  Outcome: Progressing     Problem: Knowledge Deficit  Goal: Patient/family/caregiver demonstrates understanding of disease process, treatment plan, medications, and discharge instructions  Description: Complete learning assessment and assess knowledge base.  Interventions:  - Provide teaching at level of understanding  - Provide teaching via preferred learning methods  Outcome: Progressing     Problem: Nutrition/Hydration-ADULT  Goal: Nutrient/Hydration intake appropriate for improving, restoring or maintaining nutritional needs  Description: Monitor and assess patient's nutrition/hydration status for malnutrition. Collaborate with interdisciplinary team and initiate plan and interventions as ordered.  Monitor patient's weight and dietary intake as ordered or per policy. Utilize nutrition screening tool and intervene as necessary. Determine patient's food preferences and provide high-protein, high-caloric foods as appropriate.     INTERVENTIONS:  - Monitor oral intake, urinary output, labs, and treatment plans  - Assess nutrition and hydration status and recommend course of action  - Evaluate amount of meals eaten  - Assist patient with eating if necessary   - Allow adequate time for meals  - Recommend/ encourage appropriate diets, oral nutritional supplements, and vitamin/mineral supplements  - Order, calculate, and assess calorie counts as needed  - Recommend, monitor, and adjust tube feedings and TPN/PPN based on assessed needs  - Assess need for intravenous fluids  - Provide specific nutrition/hydration education as appropriate  - Include patient/family/caregiver in decisions related to nutrition  Outcome: Progressing     Problem: MOBILITY - ADULT  Goal: Maintain or return to baseline ADL function  Description: INTERVENTIONS:  -  Assess patient's ability to carry out ADLs; assess patient's baseline for ADL function and identify physical  deficits which impact ability to perform ADLs (bathing, care of mouth/teeth, toileting, grooming, dressing, etc.)  - Assess/evaluate cause of self-care deficits   - Assess range of motion  - Assess patient's mobility; develop plan if impaired  - Assess patient's need for assistive devices and provide as appropriate  - Encourage maximum independence but intervene and supervise when necessary  - Involve family in performance of ADLs  - Assess for home care needs following discharge   - Consider OT consult to assist with ADL evaluation and planning for discharge  - Provide patient education as appropriate  Outcome: Progressing  Goal: Maintains/Returns to pre admission functional level  Description: INTERVENTIONS:  - Perform AM-PAC 6 Click Basic Mobility/ Daily Activity assessment daily.  - Set and communicate daily mobility goal to care team and patient/family/caregiver.   - Collaborate with rehabilitation services on mobility goals if consulted  - Out of bed for toileting  - Record patient progress and toleration of activity level   Outcome: Progressing     Problem: SAFETY,RESTRAINT: NV/NON-SELF DESTRUCTIVE BEHAVIOR  Goal: Remains free of harm/injury (restraint for non violent/non self-detsructive behavior)  Description: INTERVENTIONS:  - Instruct patient/family regarding restraint use   - Assess and monitor physiologic and psychological status   - Provide interventions and comfort measures to meet assessed patient needs   - Identify and implement measures to help patient regain control  - Assess readiness for release of restraint   Outcome: Progressing  Goal: Returns to optimal restraint-free functioning  Description: INTERVENTIONS:  - Assess the patient's behavior and symptoms that indicate continued need for restraint  - Identify and implement measures to help patient regain control  - Assess readiness for release of restraint   Outcome: Progressing     Problem: Prexisting or High Potential for Compromised Skin  Integrity  Goal: Skin integrity is maintained or improved  Description: INTERVENTIONS:  - Identify patients at risk for skin breakdown  - Assess and monitor skin integrity  - Assess and monitor nutrition and hydration status  - Monitor labs   - Assess for incontinence   - Turn and reposition patient  - Assist with mobility/ambulation  - Relieve pressure over bony prominences  - Avoid friction and shearing  - Provide appropriate hygiene as needed including keeping skin clean and dry  - Evaluate need for skin moisturizer/barrier cream  - Collaborate with interdisciplinary team   - Patient/family teaching  - Consider wound care consult   Outcome: Progressing

## 2024-12-27 NOTE — ASSESSMENT & PLAN NOTE
CRRT started because of persistent acidosis and volume status.  Patient is overall not doing well in spite of that and may need to go on hospice care.  Being discussed with ICU

## 2024-12-27 NOTE — ACP (ADVANCE CARE PLANNING)
Critical Care Advanced Care Planning Note  Tanisha Morrow 84 y.o. female MRN: [id number]  Unit/Bed#: ICU 10 Encounter: [id number]      Summary of Discussion:  Called to bedside by patient's niece Melvina stating that they were ready to transition to comfort care. I discussed that we would take out the endotracheal tube, stop vasopressor medications and order medications for pain and anxiety. She agreed with this plan and had all questions answered to her satisfaction. She also agreed to change code status to level 4 comfort care.       Total time spent, (10) minutes (1415 to 1425).      CODE STATUS: COMFORT CARE - Level 4  POA:    POLST:      SIGNATURE: Richard Beckett PA-C  DATE: December 27, 2024  TIME: 2:33 PM

## 2024-12-27 NOTE — DEATH NOTE
INPATIENT DEATH NOTE  Tanisha Morrow 84 y.o. female MRN: [id number]  Unit/Bed#: ICU 10 Encounter: [id number]    Date, Time and Cause of Death    Date of Death: 24  Time of Death:  3:09 PM  Preliminary Cause of Death: Small bowel perforation (HCC)  Entered by: Richard Beckett PA-C[MR1.1]       Attribution       MR1.1 Richard Beckett PA-C 24 15:14             Patient's Information  Pronounced by: Richard Beckett PA-C  Did the patient's death occur in the ED?: No  Did the patient's death occur in the OR?: No  Did the patient's death occur less than 10 days post-op?: Yes  Did the patient's death occur within 24 hours of admission?: No  Was code status DNR at the time of death?: Yes    PHYSICAL EXAM:  Unresponsive to noxious stimuli, Spontaneous respirations absent, Breath sounds absent, Carotid pulse absent, Heart sounds absent, Pupillary light reflex absent, and Corneal blink reflex absent    Medical Examiner notification criteria:   within 24 hours of surgery / procedure / anesthesia   Medical Examiner's office notified?:  Yes   Medical Examiner accepted case?:  Pending  Name of Medical Examiner: Pending    Family Notification  Was the family notified?: Yes  Date Notified: 24  Time Notified: 1509  Notified by: Richard Beckett PA-C  Name of Family Notified of Death: Melvina Juarez   Relationship to Patient: Other relative  Family Notification Route: At bedside  Was the family told to contact a  home?: Yes  Name of  Home:: TBD    Autopsy Options:  Decision for post-mortem examination not yet made by next of kin.    Primary Service Attending Physician notified?:  yes - Attending:  Mya Hanley MD    Physician/Resident responsible for completing Discharge Summary:  Richard Beckett PA-C

## 2024-12-27 NOTE — QUICK NOTE
Patient noted to decompensate overnight requiring pressors with increased lactic acid despite resuscitation.  There was also noted to be new skin changes in the bilateral flanks and going into the patient's chest.  The skin changes are concerning for a necrotizing soft tissue infection.  Due to the patient's decompensation and new changes we will plan to go to the operating room for exploratory laparotomy, possible bowel resection, possible ostomy, all other indicated procedures.

## 2024-12-27 NOTE — PROGRESS NOTES
Progress Note - Critical Care/ICU   Name: Tanisha Morrow 84 y.o. female I MRN: [id number]  Unit/Bed#: ICU 10 I Date of Admission: 12/23/2024   Date of Service: 12/27/2024 I Hospital Day: 4      Assessment & Plan  Shock (HCC)  Likely related to bowel perforation  Continue vasopressors for goal MAP>65  Trend endpoints of resuscitation  Type 2 diabetes mellitus with microalbuminuria, without long-term current use of insulin (HCC)  Lab Results   Component Value Date    HGBA1C 7.7 (A) 11/15/2024       Recent Labs     12/26/24  1116 12/26/24  1634 12/26/24  1829 12/26/24  2138   POCGLU 123 69 67 101       Blood Sugar Average: Last 72 hrs:  (P) [id number]    Patient now with hypoglycemia requiring D10 infusion  Consider early initiation of TPN  Continue SSI  Severe sepsis (HCC)  Day 3 of antibiotics, presently on cefepime/Flagyl/vancomycin  Follow culture results, temperature, white count  Await speciation of body fluid culture  Patient MRSA positive  Acute kidney failure (HCC)  Patient initiated on CRRT on 12/26  Currently running -50 mL/hr  Appreciate nephrology recommendations  Close intake and output  High anion gap metabolic acidosis  Anticipate clearance on CRRT  Worsening despite CRRT, consider sodium bicarbonate infusion  Trend BMP per protocol  Appreciate nephrology recommendations  Acute encephalopathy  Likely related to acuity of condition  Continue fentanyl and propofol for ventilatory comfort  Frequent neurologic exmas  Bowel perforation (HCC)  Secondary to calcified gallstone  Discuss with general surgery management for gallbladder  POD #2 from ex-lap with small bowel resection, anastomosis, and Abthera placement  POD #1 from ex-lap with abdominal washout, partial omentectomy, debridement of abdominal wall, abthera placement  Return to OR per general surgery  Maintain NGT tube for now  NPO- discuss early initiation of TPN  Continue IV Protonix  Acute respiratory failure with hypoxia (HCC)  Intubated  on 12/25  Continue propofol/fentanyl for comfort while mechanically ventilated  Respiratory protocol  SBT when abdomen closed  Gallstone ileus (HCC)  Discuss management of gallbladder with general surgery  Lactic acidosis  Worsened overnight with worsening mottling of the abdomen  Continue volume resuscitation  Trend lactic   Disposition: Critical care    ICU Core Measures     Vented Patient  VAP Bundle  VAP bundle ordered     A: Assess, Prevent, and Manage Pain Has pain been assessed? Yes  Need for changes to pain regimen? No   B: Both Spontaneous Awakening Trials (SATs) and Spontaneous Breathing Trials (SBTs) Plan to perform spontaneous awakening trial today? No secondary to open chest/abdomen  Plan to perform spontaneous breathing trial today? No secondary to open chest/abdomen  Obvious barriers to extubation? Yes   C: Choice of Sedation RASS Goal: -3 Moderate Sedation or -2 Light Sedation  Need for changes to sedation or analgesia regimen? No   D: Delirium CAM-ICU: Unable to perform secondary to Acute cognitive dysfunction   E: Early Mobility  Plan for early mobility? No   F: Family Engagement Plan for family engagement today? Yes       Antibiotic Review: Awaiting culture results.     Review of Invasive Devices:    [place] Plan: Continue for accurate I/O monitoring for 48 hours  Central access plan: Medications requiring central line HD cath in place.  Plan continue dialysis  [place] Plan: Keep arterial line for hemodynamic monitoring, frequent ABGs, and frequent labs    Prophylaxis:  VTE VTE covered by:  heparin (porcine), Subcutaneous, 5,000 Units at 12/26/24 2100       Stress Ulcer  covered bypantoprazole (PROTONIX) injection 40 mg [[id number]]         24 Hour Events : Worsening acidosis beginning 12/27, given 1L, CRRT to even  Subjective   Review of Systems: Review of Systems   Unable to perform ROS: Intubated       Objective :                   Vitals I/O      Most Recent Min/Max in 24hrs   Temp 97.7 °F (36.5  °C) Temp  Min: 95.7 °F (35.4 °C)  Max: 98.8 °F (37.1 °C)   Pulse 102 Pulse  Min: 93  Max: 115   Resp 16 Resp  Min: 16  Max: 28   /56 BP  Min: 80/51  Max: 159/81   O2 Sat 100 % SpO2  Min: 20 %  Max: 100 %      Intake/Output Summary (Last 24 hours) at 12/27/2024 0312  Last data filed at 12/27/2024 0200  Gross per 24 hour   Intake 4295.85 ml   Output 1877 ml   Net 2418.85 ml       Diet NPO    Invasive Monitoring   Arterial Line  [place] /35  Arterial Line BP  Min: 70/34  Max: 167/48   MAP (!) 63 mmHg  Arterial Line MAP (mmHg)  Min: 46 mmHg  Max: 100 mmHg           Physical Exam   Physical Exam  Eyes:      Comments: Pupils pinpoint   Skin:     General: Skin is cool and dry.      Coloration: Skin is pale.   HENT:      Head: Normocephalic and atraumatic.      Mouth/Throat:      Mouth: Mucous membranes are dry.   Cardiovascular:      Rate and Rhythm: Regular rhythm. Tachycardia present.   Musculoskeletal:         General: No deformity or signs of injury.      Right lower leg: Trace Edema present.      Left lower leg: Trace Edema present.   Abdominal:      Comments: Wound vac in place with bilious drainage  Right RAVINDRA drains with scant serous drainage  Left RAVINDRA drain with scant serous drainage  Worsening mottling over the abdomen, most pronounced over right quadrants   Constitutional:       Appearance: She is toxic-appearing.      Interventions: She is sedated, intubated and restrained.   Pulmonary:      Effort: Tachypnea present. She is intubated.      Breath sounds: Normal breath sounds.      Comments: Scant white secretions inline suction  Neurological:      GCS: GCS eye subscore is 2. GCS verbal subscore is 1. GCS motor subscore is 3.      Comments: Patient flexes bilateral upper extremities to palpation of abdomen  Exam performed on fentanyl infusion   Genitourinary/Anorectal:     Comments: Dyer in place with small amount of yellow urine         Diagnostic Studies        Lab Results: I have reviewed the  following results:     Medications:  Scheduled PRN   cefepime, 1,000 mg, Q24H  chlorhexidine, 15 mL, Q12H SHARON  heparin (porcine), 5,000 Units, Q8H SHARON  insulin lispro, 1-5 Units, Q6H SHARON  metroNIDAZOLE, 500 mg, Q8H  pantoprazole, 40 mg, Q24H SHARON      acetaminophen, 1,000 mg, Q6H PRN  fentaNYL, 50 mcg, Q1H PRN  ondansetron, 4 mg, Q6H PRN  vancomycin, 15 mg/kg, Daily PRN       Continuous    dextrose, 50 mL/hr, Last Rate: 50 mL/hr (12/27/24 0103)  fentaNYL, 25 mcg/hr, Last Rate: 25 mcg/hr (12/26/24 0929)  norepinephrine, 1-30 mcg/min, Last Rate: 1 mcg/min (12/27/24 0048)  NxStage K 2/Ca 3, 20,000 mL  propofol, 5-50 mcg/kg/min, Last Rate: Stopped (12/25/24 1300)  vasopressin, 0.04 Units/min, Last Rate: 0.04 Units/min (12/26/24 2100)         Labs:   CBC    Recent Labs     12/26/24  1116 12/27/24  0217   WBC 10.32* 17.81*   HGB 9.5* 8.3*   HCT 31.4* 27.5*   * 75*     BMP    Recent Labs     12/26/24 1618 12/27/24  0008   SODIUM 141 137   K 3.9 5.1    105   CO2 21 16*   AGAP 16* 16*   * 67*   CREATININE 3.12* 2.04*   CALCIUM 8.5 9.1       Coags    Recent Labs     12/26/24  0650   INR 1.63*        Additional Electrolytes  Recent Labs     12/26/24 1618 12/27/24  0008   MG 2.6 2.3   PHOS 7.6* 5.8*   CAIONIZED 1.09* 1.22          Blood Gas    Recent Labs     12/27/24  0009   PHART 7.367   VLV4KCO 22.5*   PO2ART 141.5*   WPX3EFV 12.6*   BEART -11.0   SOURCE Line, Arterial     Recent Labs     12/26/24  1119 12/26/24 1618 12/27/24  0009   PHVEN 7.267*  --   --    CPX8QNV 40.8*  --   --    PO2VEN 48.0*  --   --    AYL0XXS 18.2*  --   --    BEVEN -8.2  --   --    O4NBMOJ 74.9  --   --    SOURCE  --    < > Line, Arterial    < > = values in this interval not displayed.    LFTs  Recent Labs     12/25/24  0514 12/26/24 0448   ALT 11 50   AST 23 317*   ALKPHOS 31* 32*   ALB <1.5* 2.9*   TBILI 0.59 0.79       Infectious  No recent results  Glucose  Recent Labs     12/26/24  0448 12/26/24  1116 12/26/24  1618  12/27/24  0008   GLUC 77 114 83 72

## 2024-12-27 NOTE — RESPIRATORY THERAPY NOTE
RT Ventilator Management Note  Tanisha Morrow 84 y.o. female MRN: [id number]  Unit/Bed#: ICU 10 Encounter: [id number]      Daily Screen         12/27/2024 0048 12/27/2024 0344          Patient safety screen outcome:: Failed Failed (P)       Not Ready for Weaning due to:: Going on Transport intubated Going on Transport intubated (P)                 Physical Exam:   Assessment Type: (P) Assess only  General Appearance: (P) Sedated  Respiratory Pattern: (P) Assisted  Chest Assessment: (P) Chest expansion symmetrical  Bilateral Breath Sounds: (P) Clear, Diminished  O2 Device: (P) vent      Resp Comments: (P) No changes made to the patient's vent settings, pt to return to the OR     12/27/24 0344   Respiratory Assessment   Assessment Type Assess only   General Appearance Sedated   Respiratory Pattern Assisted   Chest Assessment Chest expansion symmetrical   Bilateral Breath Sounds Clear;Diminished   Resp Comments No changes made to the patient's vent settings, pt to return to the OR   O2 Device vent   Vent Information   Vent ID Satnam   Vent type Drager   Drager Vent Mode AC/VC+   AC/VC+ Settings   Resp Rate (BPM) 12 BPM   VT (mL) 340 mL   Insp Time (S) 0.85 S   FIO2 (%) 40 %   PEEP (cmH2O) 8 cmH2O   Rise Time (%) 20 %   Trigger Sensitivity Flow (LPM) 2 LPM   Humidification Heater   Heater Temp 98.6 °F (37 °C)   AC/VC+ Actuals   Resp Rate (BPM) 25 BPM   VT (mL) 337 mL   MV (Obs) 8.19   MAP (cmH2O) 12 cmH2O   Peak Pressure (cmH2O) 21 cmH2O   I:E Ratio (Obs) 1:1.7   Static Compliance (mL/cmH20) 23.1 mL/cmH2O   Plateau Pressure (cm H2O) 19.5 cm H2O   Heater Temperature (Obs) 98.6 °F (37 °C)   AC/VC+ ALARMS   High Peak Pressure (cmH2O) 40 cmH2O   High Resp Rate (BPM) 40 BPM   High MV (L/min) 15 L/min   Low MV (L/min) 3.5 L/min   High VT (mL) 750 mL   Maintenance   Alarm (pink) cable attached Yes   Resuscitation bag with peep valve at bedside Yes   Water bag changed No   Circuit changed No   Daily Screen   Patient safety  screen outcome: Failed   Not Ready for Weaning due to: Going on Transport intubated   IHI Ventilator Associated Pneumonia Bundle   Daily Assessment of Readiness to Extubate Yes   Head of Bed Elevated HOB 30   ETT  Oral 8 mm   Placement Date: 12/25/24   Preoxygenated: Yes  Type: Oral  Tube Size: 8 mm  Insertion: Atraumatic  Insertion attempts: 1  Placement Verification: Auscultation;End tidal CO2;Symmetrical chest wall movement  Secured at (cm): 23   Secured at (cm) 23   Measured from Lips   Secured Location Left   Repositioned Right to Left   Secured by Commercial tube rice   Site Condition Dry   Cuff Pressure (color) Green   HI-LO Suction  Continuous low suction   HI-LO Secretions Scant   HI-LO Intervention Patent

## 2024-12-27 NOTE — RESPIRATORY THERAPY NOTE
RT Ventilator Management Note  Tanisha Morrow 84 y.o. female MRN: [id number]  Unit/Bed#: ICU 10 Encounter: [id number]      Daily Screen         12/27/2024  0344 12/27/2024  0738          Patient safety screen outcome:: Failed Failed (P)       Not Ready for Weaning due to:: Going on Transport intubated Going on Transport intubated;Underline problem not resolved (P)                 Physical Exam:   Assessment Type: (P) Assess only  General Appearance: (P) Sedated  Respiratory Pattern: (P) Assisted  Chest Assessment: (P) Chest expansion symmetrical  Bilateral Breath Sounds: (P) Clear  Suction: (P) ET Tube  O2 Device: (P) vent      Resp Comments: (P) Pt remains on full vent support.  Pt to go back to OR this am.  No changes made.  Will wean as able.     12/27/24 0738   Respiratory Assessment   Assessment Type Assess only   General Appearance Sedated   Respiratory Pattern Assisted   Chest Assessment Chest expansion symmetrical   Bilateral Breath Sounds Clear   Suction ET Tube   Resp Comments Pt remains on full vent support.  Pt to go back to OR this am.  No changes made.  Will wean as able.   O2 Device vent   Vent Information   Vent ID Satnam   Vent type Drager   Drager Vent Mode AC/VC+   $ Vent Daily Charge-Subsequent Yes   $ Pulse Oximetry Spot Check Charge Completed   SpO2 97 %   AC/VC+ Settings   Resp Rate (BPM) 12 BPM   VT (mL) 340 mL   Insp Time (S) 0.85 S   FIO2 (%) 40 %   PEEP (cmH2O) 8 cmH2O   Rise Time (%) 20 %   Trigger Sensitivity Flow (LPM) 2 LPM   Humidification Heater   Heater Temp 98.6 °F (37 °C)   AC/VC+ Actuals   Resp Rate (BPM) 19 BPM   VT (mL) 343 mL   MV (Obs) 6.25   MAP (cmH2O) 12 cmH2O   Peak Pressure (cmH2O) 23 cmH2O   I:E Ratio (Obs) 1:2.5   Static Compliance (mL/cmH20) 22.7 mL/cmH2O   Plateau Pressure (cm H2O) 20.5 cm H2O   Heater Temperature (Obs) 98.4 °F (36.9 °C)   AC/VC+ ALARMS   High Peak Pressure (cmH2O) 40 cmH2O   High Resp Rate (BPM) 40 BPM   High MV (L/min) 15 L/min   Low MV (L/min) 3.5  L/min   High VT (mL) 750 mL   Maintenance   Alarm (pink) cable attached Yes   Resuscitation bag with peep valve at bedside Yes   Water bag changed No   Circuit changed No   Daily Screen   Patient safety screen outcome: Failed   Not Ready for Weaning due to: Going on Transport intubated;Underline problem not resolved   IHI Ventilator Associated Pneumonia Bundle   Daily Awakening Trials Performed Yes   Daily Assessment of Readiness to Extubate Yes   Head of Bed Elevated HOB 30   ETT  Oral 8 mm   Placement Date: 12/25/24   Preoxygenated: Yes  Type: Oral  Tube Size: 8 mm  Insertion: Atraumatic  Insertion attempts: 1  Placement Verification: Auscultation;End tidal CO2;Symmetrical chest wall movement  Secured at (cm): 23   Secured at (cm) 22   Measured from Lips   Secured Location (S)  Left   Repositioned (S)  Center to Left   Secured by Commercial tube rice   Site Condition Dry   Cuff Pressure (color) Green   HI-LO Suction  Continuous low suction   HI-LO Secretions Scant   HI-LO Intervention Patent

## 2024-12-27 NOTE — OP NOTE
OPERATIVE REPORT  PATIENT NAME: Tanisha Morrow    :  1940  MRN: [id number]  Pt Location: [facility] OR ROOM 03    SURGERY DATE: 2024    Surgeons and Role:     * Andre Ghosh,  - Primary     * Luis Mayo PA-C - Assisting     * Gianna Cooper PA-C - Assisting    Preop Diagnosis:  Bowel perforation (HCC) [K63.1]  Small bowel perforation (HCC) [K63.1]    Post-Op Diagnosis Codes:     * Bowel perforation (HCC) [K63.1]     * Small bowel perforation (HCC) [K63.1]    Procedure(s):  LAPAROTOMY EXPLORATORY. ABTHERA PLACEMENT    Specimen(s):  * No specimens in log *    Estimated Blood Loss:   Minimal    Drains:  Urethral Catheter Double-lumen 16 Fr. (Active)   Reasons to continue Urinary Catheter  Accurate I&O assessment in critically ill patients (48 hr. max) 24 0800   Goal for Removal Remove after 48 hrs of I/O monitoring 24 0800   Site Assessment Clean;Skin intact 24 0800   [place] Care Done 24 2100   Collection Container Standard drainage bag 24 0800   Securement Method Securing device (Describe) 24 0800   Securing Device Change Date 24 2000   Output (mL) 0 mL 24 0600   Number of days: 3     Anesthesia Type:   General    Operative Indications:  Bowel perforation (HCC) [K63.1]  Small bowel perforation (HCC) [K63.1]    Operative Findings:  Stomach, small bowel, colon viable without insult, anastomosis patent  Patient was noted to have mottling on the bilateral flanks and up into her chest/breasts  Incisions were done in both flanks to evaluate and there was dead subcutaneous tissue consistent with a necrotizing soft tissue infection, muscle was pale    Complications:   None    Procedure and Technique:  The patient was seen again in Preoperative Holding.  All the risks, benefits, complications, treatment options, and expected outcomes were discussed with the patient and family at length. All questions were answered to satisfaction. There was  concurrence with the proposed plan and informed consent was obtained. The site of surgery was properly noted/marked. The patient was taken to Operating Room, identified, and the procedure verified as exploratory laparotomy, possible bowel resection, all other indicated procedures.    The patient was placed in the supine position on the operating room table.  The patient had received preoperative antibiotics and SCDs placed on the bilateral lower extremities.  Anesthesia was then induced.  The ABThera VAC was removed.    The abdomen was then prepped and draped in the usual sterile fashion using Betadine.  A Time-Out was then performed with all involved present confirming the correct patient, procedure, antibiotics, and any additional concerns.     The abdomen was inspected and the stomach, small intestine, colon were noted to be viable without insult or injury.  The anastomosis was noted to be patent and viable.  There was a small amount of murky/purulent fluid in the abdomen but this was improved from prior laparotomy.  The patient was noted to have some mottling and skin changes on the bilateral flanks.  This was also noted to be going into her breasts and chest.  Incisions were made with a #15 blade on the bilateral flanks and the fat was noted to be nonviable.  Dissection was carried down to the fascia and muscle.  The muscle was noted to be pale but not necrotic.  The entirety of the subcutaneous fat was necrotic and there was a notable plane that was easily dissectible with finger dissection along the fascia.  There was also noted to be bloody/dirty dishwater type fluid.  Due to the extent of the infection and the patient's current condition this is a nonsurvivable insult.    The RAVINDRA drains were removed.  An ABThera VAC was placed.    All instrument, sponge, and needle counts were noted to be correct at the conclusion of the case.     I was present for the entire procedure., A qualified resident physician was  not available., and A physician assistant was required during the procedure for retraction, tissue handling, dissection and suturing.    Patient Disposition:  Critical Care Unit and intubated and critically ill    This procedure was not performed to treat colon cancer through resection    SIGNATURE: Andre Ghosh DO  DATE: December 27, 2024  TIME: 8:29 AM

## 2024-12-27 NOTE — CONSULTS
The patient's Vancomycin therapy has been completed / discontinued. Thank you for allowing us to take part in this patient's care. Pharmacy will sign-off now; please call or re-consult if there are any questions

## 2024-12-27 NOTE — ANESTHESIA PREPROCEDURE EVALUATION
Procedure:  LAPAROTOMY EXPLORATORY, Possible Bowel Resection, Possible Abdominal Closure, All Other Indicated Procedures (Abdomen)    Relevant Problems   CARDIO   (+) Hyperlipidemia associated with type 2 diabetes mellitus  (HCC)   (+) Hypertension associated with diabetes  (HCC)      ENDO   (+) Type 2 diabetes mellitus with microalbuminuria, without long-term current use of insulin (HCC)      GI/HEPATIC   (+) Gallstone ileus (HCC)      /RENAL   (+) Acute kidney failure (HCC)      MUSCULOSKELETAL   (+) Gout   (+) Torticollis      NEURO/PSYCH   (+) Chronic neck pain      PULMONARY   (+) Acute respiratory failure with hypoxia (HCC)      Neurology/Sleep   (+) Acute encephalopathy      Surgery/Wound/Pain   (+) Shock (HCC)      Other   (+) High anion gap metabolic acidosis   MOSF, Shock- CVVHD on Vaso.     Physical Exam    Airway    Mallampati score: already intubated         Dental       Cardiovascular      Pulmonary      Other Findings  post-pubertal.    Anesthesia Plan  ASA Score- 5 Emergent    Anesthesia Type- general with ASA Monitors.         Additional Monitors:     Airway Plan: ETT.           Plan Factors-Exercise tolerance (METS): <4 METS.    Chart reviewed. EKG reviewed. Imaging results reviewed. Existing labs reviewed. Patient summary reviewed.    Patient is not a current smoker.              Induction- intravenous.    Postoperative Plan-     Perioperative Resuscitation Plan - Level 1 - Full Code.       Informed Consent- Plan/risks discussed with: Attempted to contact both neices at listed phone numbers and unable to reach family members.  Discussed with Dr Ghosh, this case is emergent and will proceed with implied consent.  I personally reviewed this patient with the CRNA. Discussed and agreed on the Anesthesia Plan with the CRNA..

## 2024-12-28 LAB
ABO GROUP BLD BPU: NORMAL
BACTERIA SPEC BFLD CULT: ABNORMAL
BPU ID: NORMAL
CROSSMATCH: NORMAL
GRAM STN SPEC: ABNORMAL
UNIT DISPENSE STATUS: NORMAL
UNIT PRODUCT CODE: NORMAL
UNIT PRODUCT VOLUME: 324 ML
UNIT PRODUCT VOLUME: 329 ML
UNIT PRODUCT VOLUME: 350 ML
UNIT RH: NORMAL

## 2024-12-28 NOTE — ANESTHESIA POSTPROCEDURE EVALUATION
Post-Op Assessment Note    CV Status:  Unstable (on vasopressors)    Pain management: adequate       Mental Status:  Unresponsive (sedated)   Hydration Status:  Euvolemic   PONV Controlled:  Controlled   Airway Patency:  Patent  Airway: intubated     Post Op Vitals Reviewed: Yes    No anethesia notable event occurred.    Staff: Anesthesiologist   Comments: Pt  at time of note.      Last Filed PACU Vitals:  Vitals Value Taken Time   Temp 99.32 °F (37.4 °C) 24 1442   Pulse 0 24 1512   BP     Resp     SpO2 74 % 24 1512   Vitals shown include unfiled device data.    Modified [place]:  No data recorded

## 2024-12-28 NOTE — QUICK NOTE
:    8:39:   I called Sarahy, wife of patient's brother Daryn Correa. I confirmed she received the email containing the autopsy consent. I gave to her the fax number [phone] and she read back the number as well as the ICU phone number [phone] for any questions. She will have the patient's brother sign form and fax back. She was instructed to call back tomorrow () to make sure the fax was received. She expressed understanding and all questions were answered to her satisfaction presently.     Richard Beckett PA-C     10:40    Fax received from Daryn Correa. Form completed. [place] awaiting name of  home from the family.     Richard Beckett PA-C     16:36    Attempted to call Melvina at [phone] to inquire about any  arrangements and to discuss next steps once postmortem consent submitted. Call went to voicemail. Voicemail left for call back. Will follow up if call is returned.    Richard Beckett PA-C

## 2024-12-29 LAB — BACTERIA BLD CULT: NORMAL

## 2024-12-30 PROCEDURE — 88307 TISSUE EXAM BY PATHOLOGIST: CPT | Performed by: PATHOLOGY

## 2024-12-30 PROCEDURE — 88305 TISSUE EXAM BY PATHOLOGIST: CPT | Performed by: PATHOLOGY

## 2024-12-30 RX ORDER — ASPIRIN 81 MG/1
81 TABLET ORAL DAILY
Qty: 30 TABLET | OUTPATIENT
Start: 2024-12-30

## 2024-12-30 NOTE — CLINICAL RISK MANAGEMENT
Progress Note - Death in Restraints   Tanisha Morrow 84 y.o. female MRN: [id number]  Unit/Bed#: ICU 10 Encounter: [id number]      Patient  within 24 hours of restraint  with Soft restraint right wrist and Soft restraint left wrist. Death unrelated to use of restraints. This situation was tracked internally. CMS and PA-EMIGDIO  notification not required.

## 2024-12-31 NOTE — UTILIZATION REVIEW
NOTIFICATION OF ADMISSION DISCHARGE   This is a Notification of Discharge from [facility]. Please be advised that this patient has been discharge from our facility. Below you will find the admission and discharge date and time including the patient’s disposition.   UTILIZATION REVIEW CONTACT:  Alicia Watson  Utilization   Network Utilization Review Department  Phone: [phone] x carefully listen to the prompts. All voicemails are confidential.  Email: [email or web address]     ADMISSION INFORMATION  PRESENTATION DATE: 2024  1:20 PM  OBERVATION ADMISSION DATE: N/A  INPATIENT ADMISSION DATE: 24  2:36 PM   DISCHARGE DATE: 2024  7:10 PM   DISPOSITION:    Network Utilization Review Department  ATTENTION: Please call with any questions or concerns to [phone] and carefully listen to the prompts so that you are directed to the right person. All voicemails are confidential.   For Discharge needs, contact Care Management DC Support Team at [phone] opt. 2  Send all requests for admission clinical reviews, approved or denied determinations and any other requests to dedicated fax number below belonging to the campus where the patient is receiving treatment. List of dedicated fax numbers for the Facilities:  FACILITY NAME UR FAX NUMBER   ADMISSION DENIALS (Administrative/Medical Necessity) [phone]   DISCHARGE SUPPORT TEAM ([facility]) [phone]   PARENT CHILD HEALTH (Maternity/NICU/Pediatrics) [phone]   [facility] [phone]   [facility] [phone]   [facility] [phone]   [facility] [phone]   [facility] [phone]   [facility] [phone]   [facility] [phone]   [facility]  [phone]   [facility] [phone]   [facility] [phone]   [facility] [phone]   [facility] [phone]

## 2025-01-30 LAB
BASE EXCESS BLDA CALC-SCNC: -2 MMOL/L (ref -2–3)
BASE EXCESS BLDA CALC-SCNC: -6 MMOL/L (ref -2–3)
BASE EXCESS BLDA CALC-SCNC: -7 MMOL/L (ref -2–3)
CA-I BLD-SCNC: 0.96 MMOL/L (ref 1.12–1.32)
CA-I BLD-SCNC: 1.06 MMOL/L (ref 1.12–1.32)
CA-I BLD-SCNC: 1.12 MMOL/L (ref 1.12–1.32)
GLUCOSE SERPL-MCNC: 138 MG/DL (ref 65–140)
GLUCOSE SERPL-MCNC: 140 MG/DL (ref 65–140)
GLUCOSE SERPL-MCNC: 147 MG/DL (ref 65–140)
HCO3 BLDA-SCNC: 17.7 MMOL/L (ref 24–30)
HCO3 BLDA-SCNC: 18.9 MMOL/L (ref 22–28)
HCO3 BLDA-SCNC: 20.6 MMOL/L (ref 22–28)
HCT VFR BLD CALC: 22 % (ref 34.8–46.1)
HCT VFR BLD CALC: 24 % (ref 34.8–46.1)
HCT VFR BLD CALC: 29 % (ref 34.8–46.1)
HGB BLDA-MCNC: 7.5 G/DL (ref 11.5–15.4)
HGB BLDA-MCNC: 8.2 G/DL (ref 11.5–15.4)
HGB BLDA-MCNC: 9.9 G/DL (ref 11.5–15.4)
PCO2 BLD: 19 MMOL/L (ref 21–32)
PCO2 BLD: 20 MMOL/L (ref 21–32)
PCO2 BLD: 21 MMOL/L (ref 21–32)
PCO2 BLD: 27.3 MM HG (ref 36–44)
PCO2 BLD: 31.5 MM HG (ref 42–50)
PCO2 BLD: 35.9 MM HG (ref 36–44)
PH BLD: 7.33 [PH] (ref 7.35–7.45)
PH BLD: 7.36 [PH] (ref 7.3–7.4)
PH BLD: 7.49 [PH] (ref 7.35–7.45)
PO2 BLD: 205 MM HG (ref 75–129)
PO2 BLD: 387 MM HG (ref 75–129)
PO2 BLD: 99 MM HG (ref 35–45)
POTASSIUM BLD-SCNC: 3.3 MMOL/L (ref 3.5–5.3)
POTASSIUM BLD-SCNC: 4.1 MMOL/L (ref 3.5–5.3)
POTASSIUM BLD-SCNC: 4.4 MMOL/L (ref 3.5–5.3)
SAO2 % BLD FROM PO2: 100 % (ref 60–85)
SAO2 % BLD FROM PO2: 100 % (ref 60–85)
SAO2 % BLD FROM PO2: 98 % (ref 60–85)
SODIUM BLD-SCNC: 139 MMOL/L (ref 136–145)
SODIUM BLD-SCNC: 139 MMOL/L (ref 136–145)
SODIUM BLD-SCNC: 140 MMOL/L (ref 136–145)
SPECIMEN SOURCE: ABNORMAL

## 2025-07-22 NOTE — ASSESSMENT & PLAN NOTE
Will continue OCP  Orders:    Low-Ogestrel 0.3-30 MG-MCG per tablet; Take 1 tablet by mouth daily     Worsened overnight with worsening mottling of the abdomen  Continue volume resuscitation  Trend lactic

## (undated) DEVICE — SCD SEQUENTIAL COMPRESSION COMFORT SLEEVE MEDIUM KNEE LENGTH: Brand: KENDALL SCD

## (undated) DEVICE — ASTOUND IMPERVIOUS SURGICAL GOWN: Brand: CONVERTORS

## (undated) DEVICE — INTENDED FOR TISSUE SEPARATION, AND OTHER PROCEDURES THAT REQUIRE A SHARP SURGICAL BLADE TO PUNCTURE OR CUT.: Brand: BARD-PARKER SAFETY BLADES SIZE 10, STERILE

## (undated) DEVICE — MEDI-VAC YANK SUCT HNDL W/TPRD BULBOUS TIP: Brand: CARDINAL HEALTH

## (undated) DEVICE — ELECTRODE BLADE MOD  E-Z CLEAN 6.5IN -0014M

## (undated) DEVICE — POV-IOD SOLUTION 4OZ BT

## (undated) DEVICE — BETHLEHEM MAJOR GENERAL PACK: Brand: CARDINAL HEALTH

## (undated) DEVICE — CURITY NON-ADHERENT STRIPS: Brand: CURITY

## (undated) DEVICE — CHLORAPREP HI-LITE 26ML ORANGE

## (undated) DEVICE — PROXIMATE LINEAR CUTTER RELOAD, BLUE, 75MM: Brand: PROXIMATE

## (undated) DEVICE — GLOVE INDICATOR PI UNDERGLOVE SZ 7 BLUE

## (undated) DEVICE — 3M™ DURAPORE™ SURGICAL TAPE 1538-3, 3 INCH X 10 YARD (7,5CM X 9,1M), 4 ROLLS/BOX: Brand: 3M™ DURAPORE™

## (undated) DEVICE — SUT CHROMIC 0 CT 36 IN 914H

## (undated) DEVICE — PROXIMATE RELOADABLE LINEAR STAPLER: Brand: PROXIMATE

## (undated) DEVICE — PAD GROUNDING DUAL ADULT

## (undated) DEVICE — SUT SILK 3-0 SH CR/8 18 IN C013D

## (undated) DEVICE — GAUZE SPONGES,USP TYPE VII GAUZE, 12 PLY: Brand: CURITY

## (undated) DEVICE — PROXIMATE PLUS MD MULTI-DIRECTIONAL RELEASE SKIN STAPLERS CONTAINS 35 STAINLESS STEEL STAPLES APPROXIMATE CLOSED DIMENSIONS: 6.9MM X 3.9MM WIDE: Brand: PROXIMATE

## (undated) DEVICE — TOWEL SURG XR DETECT GREEN STRL RFD

## (undated) DEVICE — ENSEAL 20 CM SHAFT, LARGE JAW: Brand: ENSEAL X1

## (undated) DEVICE — POOLE SUCTION HANDLE: Brand: CARDINAL HEALTH

## (undated) DEVICE — GAUZE SPONGES,16 PLY: Brand: CURITY

## (undated) DEVICE — NEEDLE 18 G X 1 1/2 SAFETY

## (undated) DEVICE — SUT SILK 3-0 SH 30 IN K832H

## (undated) DEVICE — TIBURON LAPAROSCOPIC ABDOMINAL DRAPE: Brand: CONVERTORS

## (undated) DEVICE — GLOVE SRG BIOGEL 7

## (undated) DEVICE — PROXIMATE SKIN STAPLERS (35 WIDE) CONTAINS 35 STAINLESS STEEL STAPLES (FIXED HEAD): Brand: PROXIMATE

## (undated) DEVICE — SUT VICRYL 3-0 SH 27 IN J416H

## (undated) DEVICE — INTENDED FOR TISSUE SEPARATION, AND OTHER PROCEDURES THAT REQUIRE A SHARP SURGICAL BLADE TO PUNCTURE OR CUT.: Brand: BARD-PARKER ® CARBON RIB-BACK BLADES

## (undated) DEVICE — SUT VICRYL 0 18 IN J906G

## (undated) DEVICE — SUT CHROMIC 3-0 SH 27 IN G122H

## (undated) DEVICE — VAC CANISTER 500ML

## (undated) DEVICE — SUT PDS II 0 TP-1 60 IN Z991G

## (undated) DEVICE — 4-PORT MANIFOLD: Brand: NEPTUNE 2

## (undated) DEVICE — PLUMEPEN PRO 10FT

## (undated) DEVICE — SPONGE LAP 18 X 18 IN

## (undated) DEVICE — SUT VICRYL 2-0 18 IN J905T

## (undated) DEVICE — TUBING SMOKE EVAC W/FILTRATION DEVICE PLUMEPORT ACTIV

## (undated) DEVICE — DRAPE EQUIPMENT RF WAND

## (undated) DEVICE — TRAY FOLEY 16FR SURESTEP TEMP SENS URIMETER STAT LOK

## (undated) DEVICE — ABTHERA OPEN ABDOMEN DRESSING WITH SENSATRAC PAD: Brand: ABTHERA™ SENSAT.R.A.C.™

## (undated) DEVICE — 3M™ TEGADERM™ CHG DRESSING 25/CARTON 4 CARTONS/CASE 1659: Brand: TEGADERM™

## (undated) DEVICE — PROXIMATE RELOADABLE LINEAR CUTTER WITH SAFETY LOCK-OUT, 75MM: Brand: PROXIMATE

## (undated) DEVICE — SUT PDS II 0 CTX 60 IN Z990G